# Patient Record
Sex: FEMALE | Race: WHITE | Employment: OTHER | URBAN - METROPOLITAN AREA
[De-identification: names, ages, dates, MRNs, and addresses within clinical notes are randomized per-mention and may not be internally consistent; named-entity substitution may affect disease eponyms.]

---

## 2017-03-16 ENCOUNTER — OFFICE VISIT (OUTPATIENT)
Dept: NEUROLOGY | Age: 82
End: 2017-03-16

## 2017-03-16 VITALS
HEART RATE: 54 BPM | SYSTOLIC BLOOD PRESSURE: 114 MMHG | DIASTOLIC BLOOD PRESSURE: 63 MMHG | HEIGHT: 64 IN | BODY MASS INDEX: 24.92 KG/M2 | OXYGEN SATURATION: 94 % | WEIGHT: 146 LBS

## 2017-03-16 DIAGNOSIS — I10 UNSPECIFIED ESSENTIAL HYPERTENSION: ICD-10-CM

## 2017-03-16 DIAGNOSIS — I25.10 CAD IN NATIVE ARTERY: ICD-10-CM

## 2017-03-16 DIAGNOSIS — F34.1 DYSTHYMIA: ICD-10-CM

## 2017-03-16 DIAGNOSIS — G25.2 ESSENTIAL AND OTHER SPECIFIED FORMS OF TREMOR: Primary | ICD-10-CM

## 2017-03-16 DIAGNOSIS — G62.9 POLYNEUROPATHY: ICD-10-CM

## 2017-03-16 DIAGNOSIS — E78.2 MIXED HYPERLIPIDEMIA: ICD-10-CM

## 2017-03-16 DIAGNOSIS — G25.0 ESSENTIAL AND OTHER SPECIFIED FORMS OF TREMOR: Primary | ICD-10-CM

## 2017-03-16 PROCEDURE — 99214 OFFICE O/P EST MOD 30 MIN: CPT | Performed by: PSYCHIATRY & NEUROLOGY

## 2017-07-26 ENCOUNTER — HOSPITAL ENCOUNTER (OUTPATIENT)
Dept: GENERAL RADIOLOGY | Age: 82
Discharge: HOME | End: 2017-07-26
Attending: PSYCHIATRY & NEUROLOGY | Admitting: PSYCHIATRY & NEUROLOGY

## 2017-07-26 LAB
FOLATE: >20 NG/ML (ref 4.78–24.2)
HOMOCYSTEINE: 20 UMOL/L (ref 0–10)
TSH SERPL DL<=0.05 MIU/L-ACNC: 2.35 UIU/ML (ref 0.27–4.2)
VITAMIN B-12: 196 PG/ML (ref 211–911)

## 2017-07-27 LAB
ESTIMATED AVERAGE GLUCOSE: 102.5 MG/DL
HBA1C MFR BLD: 5.2 %

## 2017-07-31 LAB
ALBUMIN SERPL-MCNC: 3.4 G/DL (ref 3.1–4.9)
ALPHA-1-GLOBULIN: 0.3 G/DL (ref 0.2–0.4)
ALPHA-2-GLOBULIN: 0.9 G/DL (ref 0.4–1.1)
BETA GLOBULIN: 1 G/DL (ref 0.9–1.6)
GAMMA GLOBULIN: 0.8 G/DL (ref 0.6–1.8)
M SPIKE 1 SERUM PROTEIN ELEC: 0.3 G/DL
SPE/IFE INTERPRETATION: NORMAL
TOTAL PROTEIN: 6.5 G/DL (ref 6.4–8.2)

## 2018-01-08 ENCOUNTER — HOSPITAL ENCOUNTER (OUTPATIENT)
Dept: SURGERY | Age: 83
Discharge: HOME | End: 2018-01-08
Attending: OPHTHALMOLOGY | Admitting: OPHTHALMOLOGY

## 2018-01-08 VITALS
RESPIRATION RATE: 16 BRPM | HEIGHT: 64 IN | DIASTOLIC BLOOD PRESSURE: 45 MMHG | HEART RATE: 52 BPM | OXYGEN SATURATION: 98 % | SYSTOLIC BLOOD PRESSURE: 133 MMHG | TEMPERATURE: 97.7 F

## 2018-01-08 RX ORDER — SODIUM CHLORIDE, SODIUM LACTATE, POTASSIUM CHLORIDE, CALCIUM CHLORIDE 600; 310; 30; 20 MG/100ML; MG/100ML; MG/100ML; MG/100ML
INJECTION, SOLUTION INTRAVENOUS CONTINUOUS
Status: DISCONTINUED | OUTPATIENT
Start: 2018-01-08 | End: 2018-01-09 | Stop reason: HOSPADM

## 2018-01-08 RX ADMIN — SODIUM CHLORIDE, SODIUM LACTATE, POTASSIUM CHLORIDE, CALCIUM CHLORIDE: 600; 310; 30; 20 INJECTION, SOLUTION INTRAVENOUS at 07:14

## 2018-01-08 ASSESSMENT — PAIN DESCRIPTION - LOCATION: LOCATION: EYE

## 2018-01-08 ASSESSMENT — PAIN - FUNCTIONAL ASSESSMENT: PAIN_FUNCTIONAL_ASSESSMENT: 0-10

## 2018-01-08 ASSESSMENT — PAIN DESCRIPTION - ORIENTATION: ORIENTATION: LEFT

## 2018-01-08 ASSESSMENT — PAIN SCALES - GENERAL: PAINLEVEL_OUTOF10: 0

## 2018-01-08 ASSESSMENT — PAIN DESCRIPTION - PAIN TYPE: TYPE: SURGICAL PAIN

## 2018-01-08 NOTE — OP NOTE
placed on the eye. The eye was covered with a metal shield. The patient tolerated the procedure well and was taken to the PACU in excellent condition. They will follow up with me tomorrow for postop care.     CDE: 7.46    Lens: [id number] 22.0 D, SN [id number]    Real Brown

## 2018-01-08 NOTE — ANESTHESIA POST-OP
Postoperative Anesthesia Note    Name:    Boris Skelton  MRN:      [id number]    Patient Vitals for the past 12 hrs:   BP Temp Temp src Pulse Resp SpO2 Height   01/08/18 0838 (!) 133/45 97.7 °F (36.5 °C) Temporal 52 16 98 % -   01/08/18 0656 (!) 149/53 97 °F (36.1 °C) Temporal 53 16 100 % 5' 4\" (1.626 m)        LABS:    CBC  Lab Results   Component Value Date/Time    WBC 7.4 05/09/2014 11:45 AM    HGB 12.2 05/09/2014 11:45 AM    HCT 37.2 05/09/2014 11:45 AM     05/09/2014 11:45 AM     RENAL  Lab Results   Component Value Date/Time     05/09/2014 11:45 AM    K 2.6 (LL) 05/09/2014 11:45 AM    CL 98 (L) 05/09/2014 11:45 AM    CO2 33 (H) 05/09/2014 11:45 AM    BUN 15 05/09/2014 11:45 AM    CREATININE 0.6 05/09/2014 11:45 AM    GLUCOSE 95 05/09/2014 11:45 AM     COAGS  Lab Results   Component Value Date/Time    PROTIME 10.3 05/09/2014 11:45 AM    INR 0.92 05/09/2014 11:45 AM    APTT 27.3 05/09/2014 11:45 AM       Intake & Output: In: 300 [I.V.:300]  Out: -     Nausea & Vomiting:  No    Level of Consciousness:  Awake    Pain Assessment:  Adequate analgesia    Anesthesia Complications:  No apparent anesthetic complications    SUMMARY      Vital signs stable  OK to discharge from Stage I post anesthesia care.   Care transferred from Anesthesiology department on discharge from perioperative area

## 2018-01-08 NOTE — H&P
Update to the H and P    No changes to the patient's medical history. No new symptoms or diagnoses. No chest pain or trouble breathing.     Tameka Mix

## 2018-01-08 NOTE — ANESTHESIA PRE-OP
Department of Anesthesiology  Preprocedure Note       Name:  Linda Saenz   Age:  80 y.o.  :  3/16/1932                                          MRN:  [id number]         Date:  2018      Surgeon:    Procedure:    Medications prior to admission:   Prior to Admission medications    Medication Sig Start Date End Date Taking? Authorizing Provider   primidone (MYSOLINE) 50 MG tablet Take 2 tablets by mouth 2 times daily 16  Yes Tom Gutiérrez MD   citalopram (CELEXA) 20 MG tablet Take 20 mg by mouth daily. Yes Historical Provider, MD   aspirin (ASPIRIN CHILDRENS) 81 MG chewable tablet Take 1 tablet by mouth daily. 14  Yes Perez Figueroa MD   carbidopa-levodopa (SINEMET)  MG per tablet Take 1 tablet by mouth 2 times daily. Yes Historical Provider, MD   potassium chloride SA (K-DUR;KLOR-CON M) 10 MEQ tablet Take 10 mEq by mouth daily. Yes Historical Provider, MD   atenolol (TENORMIN) 25 MG tablet Take 25 mg by mouth 2 times daily. Yes Historical Provider, MD   simvastatin (ZOCOR) 20 MG tablet Take 20 mg by mouth nightly. Yes Historical Provider, MD   naproxen (NAPROSYN) 500 MG tablet Take 500 mg by mouth daily     Historical Provider, MD       Current medications:    Current Outpatient Prescriptions   Medication Sig Dispense Refill    primidone (MYSOLINE) 50 MG tablet Take 2 tablets by mouth 2 times daily 360 tablet 3    citalopram (CELEXA) 20 MG tablet Take 20 mg by mouth daily.  aspirin (ASPIRIN CHILDRENS) 81 MG chewable tablet Take 1 tablet by mouth daily. 30 tablet 0    carbidopa-levodopa (SINEMET)  MG per tablet Take 1 tablet by mouth 2 times daily.  potassium chloride SA (K-DUR;KLOR-CON M) 10 MEQ tablet Take 10 mEq by mouth daily.  atenolol (TENORMIN) 25 MG tablet Take 25 mg by mouth 2 times daily.  simvastatin (ZOCOR) 20 MG tablet Take 20 mg by mouth nightly.         naproxen (NAPROSYN) 500 MG tablet Take 500 mg by mouth daily Current Facility-Administered Medications   Medication Dose Route Frequency Provider Last Rate Last Dose    lactated ringers infusion   Intravenous Continuous Yesenia Prasad MD 50 mL/hr at 01/08/18 0714      lidocaine 1 % (PF) injection 0.1 mL  0.1 mL Intradermal Once PRN Yesenia Prasad MD         Facility-Administered Medications Ordered in Other Encounters   Medication Dose Route Frequency Provider Last Rate Last Dose    lidocaine PF 2 % in hylenex   Intraocular Once Lynn Lester MD           Allergies:  No Known Allergies    Problem List:    Patient Active Problem List   Diagnosis Code    Essential and other specified forms of tremor G25.0, G25.2    Hereditary and idiopathic peripheral neuropathy G60.9    Abnormality of gait R26.9    Essential hypertension I10    Mixed hyperlipidemia E78.2    Dysthymia F34.1    Polyneuropathy ([address] G62.9    Essential hypertension I10    CAD in native artery I25.10       Past Medical History:        Diagnosis Date    Arthritis     Asthma     past hx    Hyperlipidemia     Hypertension     Tremors of nervous system     Unspecified cerebral artery occlusion with cerebral infarction        Past Surgical History:        Procedure Laterality Date    BACK SURGERY      CARPAL TUNNEL RELEASE Bilateral     CHOLECYSTECTOMY      HERNIA REPAIR      NASAL POLYP SURGERY      and polyps from \"throat\"       Social History:    Social History   Substance Use Topics    Smoking status: Never Smoker    Smokeless tobacco: Never Used    Alcohol use No                                Counseling given: Not Answered      Vital Signs (Current):   Vitals:    01/08/18 0656   BP: (!) 149/53   Pulse: 53   Resp: 16   Temp: 97 °F (36.1 °C)   TempSrc: Temporal   SpO2: 100%   Height: 5' 4\" (1.626 m)                                              BP Readings from Last 3 Encounters:   01/08/18 (!) 149/53   03/16/17 114/63   11/22/16 140/74       NPO Status: Time of

## 2018-02-21 ENCOUNTER — HOSPITAL ENCOUNTER (OUTPATIENT)
Dept: GENERAL RADIOLOGY | Age: 83
Discharge: HOME | End: 2018-02-21
Attending: PSYCHIATRY & NEUROLOGY | Admitting: PSYCHIATRY & NEUROLOGY

## 2018-02-21 LAB
HOMOCYSTEINE: 17 UMOL/L (ref 0–10)
VITAMIN B-12: 928 PG/ML (ref 211–911)

## 2018-05-17 ENCOUNTER — HOSPITAL ENCOUNTER (OUTPATIENT)
Dept: MAMMOGRAPHY | Age: 83
Discharge: HOME | End: 2018-05-17
Admitting: INTERNAL MEDICINE

## 2018-05-17 DIAGNOSIS — N63.0 LUMP OR MASS IN BREAST: ICD-10-CM

## 2019-01-25 ENCOUNTER — ANESTHESIA EVENT (OUTPATIENT)
Dept: OPERATING ROOM | Age: 84
End: 2019-01-25
Payer: MEDICARE

## 2019-01-28 ENCOUNTER — ANESTHESIA (OUTPATIENT)
Dept: OPERATING ROOM | Age: 84
End: 2019-01-28
Payer: MEDICARE

## 2019-01-28 ENCOUNTER — HOSPITAL ENCOUNTER (OUTPATIENT)
Age: 84
Setting detail: OUTPATIENT SURGERY
Discharge: HOME | End: 2019-01-28
Attending: OPHTHALMOLOGY | Admitting: OPHTHALMOLOGY
Payer: MEDICARE

## 2019-01-28 VITALS
HEART RATE: 65 BPM | TEMPERATURE: 98.8 F | WEIGHT: 139 LBS | DIASTOLIC BLOOD PRESSURE: 80 MMHG | HEIGHT: 61 IN | BODY MASS INDEX: 26.24 KG/M2 | OXYGEN SATURATION: 98 % | SYSTOLIC BLOOD PRESSURE: 138 MMHG | RESPIRATION RATE: 18 BRPM

## 2019-01-28 VITALS — SYSTOLIC BLOOD PRESSURE: 145 MMHG | DIASTOLIC BLOOD PRESSURE: 65 MMHG | OXYGEN SATURATION: 100 %

## 2019-01-28 PROCEDURE — 2580000003 HC RX 258: Performed by: OPHTHALMOLOGY

## 2019-01-28 PROCEDURE — 6370000000 HC RX 637 (ALT 250 FOR IP): Performed by: OPHTHALMOLOGY

## 2019-01-28 PROCEDURE — 3700000001 HC ADD 15 MINUTES (ANESTHESIA): Performed by: OPHTHALMOLOGY

## 2019-01-28 PROCEDURE — 3600000013 HC SURGERY LEVEL 3 ADDTL 15MIN: Performed by: OPHTHALMOLOGY

## 2019-01-28 PROCEDURE — 6360000002 HC RX W HCPCS: Performed by: NURSE ANESTHETIST, CERTIFIED REGISTERED

## 2019-01-28 PROCEDURE — 7100000010 HC PHASE II RECOVERY - FIRST 15 MIN: Performed by: OPHTHALMOLOGY

## 2019-01-28 PROCEDURE — 2709999900 HC NON-CHARGEABLE SUPPLY: Performed by: OPHTHALMOLOGY

## 2019-01-28 PROCEDURE — 7100000011 HC PHASE II RECOVERY - ADDTL 15 MIN: Performed by: OPHTHALMOLOGY

## 2019-01-28 PROCEDURE — 3700000000 HC ANESTHESIA ATTENDED CARE: Performed by: OPHTHALMOLOGY

## 2019-01-28 PROCEDURE — 6360000002 HC RX W HCPCS: Performed by: OPHTHALMOLOGY

## 2019-01-28 PROCEDURE — V2632 POST CHMBR INTRAOCULAR LENS: HCPCS | Performed by: OPHTHALMOLOGY

## 2019-01-28 PROCEDURE — 2580000003 HC RX 258: Performed by: ANESTHESIOLOGY

## 2019-01-28 PROCEDURE — 3600000003 HC SURGERY LEVEL 3 BASE: Performed by: OPHTHALMOLOGY

## 2019-01-28 PROCEDURE — 2500000003 HC RX 250 WO HCPCS: Performed by: OPHTHALMOLOGY

## 2019-01-28 DEVICE — ACRYSOF(R) IQ ASPHERIC IOL SP ACRYLIC FOLDABLELENS WULTRASERT(TM) DELIVERY SYSTEM UV WBLUE LIGHT FILTER. 13.0MM LENGTH 6.0MM ANTERIORASYMMETRIC BICONVEX OPTIC PLANAR HAPTICS.
Type: IMPLANTABLE DEVICE | Site: EYE | Status: FUNCTIONAL
Brand: ACRYSOF ULTRASERT

## 2019-01-28 RX ORDER — BACITRACIN 500 [USP'U]/G
OINTMENT OPHTHALMIC PRN
Status: DISCONTINUED | OUTPATIENT
Start: 2019-01-28 | End: 2019-01-28 | Stop reason: HOSPADM

## 2019-01-28 RX ORDER — PROPOFOL 10 MG/ML
INJECTION, EMULSION INTRAVENOUS PRN
Status: DISCONTINUED | OUTPATIENT
Start: 2019-01-28 | End: 2019-01-28 | Stop reason: SDUPTHER

## 2019-01-28 RX ORDER — SODIUM CHLORIDE, SODIUM LACTATE, POTASSIUM CHLORIDE, CALCIUM CHLORIDE 600; 310; 30; 20 MG/100ML; MG/100ML; MG/100ML; MG/100ML
INJECTION, SOLUTION INTRAVENOUS CONTINUOUS
Status: DISCONTINUED | OUTPATIENT
Start: 2019-01-28 | End: 2019-01-28 | Stop reason: HOSPADM

## 2019-01-28 RX ORDER — SODIUM CHLORIDE 0.9 % (FLUSH) 0.9 %
10 SYRINGE (ML) INJECTION PRN
Status: DISCONTINUED | OUTPATIENT
Start: 2019-01-28 | End: 2019-01-28 | Stop reason: HOSPADM

## 2019-01-28 RX ORDER — SODIUM CHLORIDE 0.9 % (FLUSH) 0.9 %
10 SYRINGE (ML) INJECTION EVERY 12 HOURS SCHEDULED
Status: DISCONTINUED | OUTPATIENT
Start: 2019-01-28 | End: 2019-01-28 | Stop reason: HOSPADM

## 2019-01-28 RX ORDER — LIDOCAINE HYDROCHLORIDE 10 MG/ML
1 INJECTION, SOLUTION EPIDURAL; INFILTRATION; INTRACAUDAL; PERINEURAL
Status: DISCONTINUED | OUTPATIENT
Start: 2019-01-28 | End: 2019-01-28 | Stop reason: HOSPADM

## 2019-01-28 RX ORDER — MAGNESIUM HYDROXIDE 1200 MG/15ML
LIQUID ORAL PRN
Status: DISCONTINUED | OUTPATIENT
Start: 2019-01-28 | End: 2019-01-28 | Stop reason: HOSPADM

## 2019-01-28 RX ADMIN — Medication 0.3 ML: at 08:17

## 2019-01-28 RX ADMIN — SODIUM CHLORIDE, POTASSIUM CHLORIDE, SODIUM LACTATE AND CALCIUM CHLORIDE: 600; 310; 30; 20 INJECTION, SOLUTION INTRAVENOUS at 08:11

## 2019-01-28 RX ADMIN — Medication 0.3 ML: at 08:04

## 2019-01-28 RX ADMIN — Medication 0.3 ML: at 08:28

## 2019-01-28 RX ADMIN — PROPOFOL 50 MG: 10 INJECTION, EMULSION INTRAVENOUS at 09:58

## 2019-01-28 ASSESSMENT — PULMONARY FUNCTION TESTS
PIF_VALUE: 1
PIF_VALUE: 2
PIF_VALUE: 1

## 2019-01-28 ASSESSMENT — PAIN - FUNCTIONAL ASSESSMENT: PAIN_FUNCTIONAL_ASSESSMENT: 0-10

## 2019-01-28 ASSESSMENT — PAIN SCALES - GENERAL: PAINLEVEL_OUTOF10: 0

## 2021-05-05 ENCOUNTER — HOSPITAL ENCOUNTER (OUTPATIENT)
Age: 86
Discharge: HOME | End: 2021-05-05
Payer: MEDICARE

## 2021-05-05 PROCEDURE — 82784 ASSAY IGA/IGD/IGG/IGM EACH: CPT

## 2021-05-05 PROCEDURE — 84155 ASSAY OF PROTEIN SERUM: CPT

## 2021-05-05 PROCEDURE — 83883 ASSAY NEPHELOMETRY NOT SPEC: CPT

## 2021-05-05 PROCEDURE — 36415 COLL VENOUS BLD VENIPUNCTURE: CPT

## 2021-05-05 PROCEDURE — 84165 PROTEIN E-PHORESIS SERUM: CPT

## 2021-05-19 ENCOUNTER — HOSPITAL ENCOUNTER (EMERGENCY)
Age: 86
Discharge: HOME | End: 2021-05-19
Attending: EMERGENCY MEDICINE
Payer: MEDICARE

## 2021-05-19 ENCOUNTER — APPOINTMENT (OUTPATIENT)
Dept: CT IMAGING | Age: 86
End: 2021-05-19
Payer: MEDICARE

## 2021-05-19 VITALS
RESPIRATION RATE: 16 BRPM | OXYGEN SATURATION: 96 % | BODY MASS INDEX: 24.75 KG/M2 | HEIGHT: 64 IN | WEIGHT: 145 LBS | HEART RATE: 63 BPM | TEMPERATURE: 97.4 F | DIASTOLIC BLOOD PRESSURE: 63 MMHG | SYSTOLIC BLOOD PRESSURE: 121 MMHG
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
## 2021-05-19 PROCEDURE — 70450 CT HEAD/BRAIN W/O DYE: CPT

## 2021-05-19 PROCEDURE — 99284 EMERGENCY DEPT VISIT MOD MDM: CPT

## 2021-05-19 NOTE — ED NOTES
Bed: 14  Expected date:   Expected time:   Means of arrival:   Comments:  Simi Jean RN  05/19/21 3710

## 2021-05-19 NOTE — ED PROVIDER NOTES
Emergency Department Encounter    Patient: Tianna Bergeron  MRN: [id number]  : 3/16/1932  Date of Evaluation: 2021  ED Provider:  Zakia Larose MD    Triage Chief Complaint:   Fall (fell in the bathroom this morning, unsure of how she landed, denies pain or hitting her head)    [place]:  Tianna Bergeron is a 80 y.o. female that presents to the ER for evaluation of mechanical fall, the patient herself has no active complaints she has no signs of head trauma. She has no neck pain no back pain. No hip pain. No long bone deformities. No abdominal pain. No chest pain pressure acute shortness of breath no complaints. Not a witnessed mechanical fall. Patient states she has a history of disequilibrium. She is no motor or sensory deficit. No vomiting. She has no complaints.     ROS - see HPI, below listed is current ROS at time of my eval:  General:  No fevers, no chills, no weakness  Eyes:  No recent vison changes, no discharge  ENT:  No sore throat, no nasal congestion, no hearing changes  Cardiovascular:  No chest pain  Respiratory:  No shortness of breath  Gastrointestinal:  No pain, no nausea, no vomiting, no diarrhea  Musculoskeletal:  No muscle pain, no joint pain  Skin:  No rash, no pruritis, no easy bruising  Neurologic:  No speech problems, no  headache, no extremity numbness, no extremity tingling, no extremity weakness, no neck pain or stiffness  Psychiatric:  No anxiety  Extremities:  no edema, no pain    Past Medical History:   Diagnosis Date    Arthritis     Asthma     past hx    Cancer ([facility]     skin    Hyperlipidemia     Hypertension     Tremors of nervous system     Unspecified cerebral artery occlusion with cerebral infarction     X 2-      Past Surgical History:   Procedure Laterality Date    BACK SURGERY      CARPAL TUNNEL RELEASE Bilateral     CATARACT REMOVAL WITH IMPLANT Left 2018    CHOLECYSTECTOMY      HERNIA REPAIR      INTRACAPSULAR CATARACT EXTRACTION sensation intact to light touch in all extremities, CN grossly intact, gait normal, no drift    I have reviewed and interpreted all of the currently available lab results from this visit (if applicable):  No results found for this visit on 05/19/21. Radiographs (if obtained):  Radiologist's Report Reviewed:  No results found. MDM:  Patient presenting after head injury. Presentation, history and physical exam do not appear consistent with intracranial hemorrhage. Presentation is < 24 hours after injury    Patient did have LOC, amnesia or disorientation after head trauma. Patient is >57 years old  No new focal neuro deficits  No AMS  No signs of skull fracture (no hemotympanum, no racoon's eyes, no evidence of CSF otorrhea or rhinorrhea, no mastoid bruising, no skull depression or step off)  No clinical intoxication  Not on anticoagulation/antiplatelet therapy  No bleeding disorder  Did not have >1 episode of Vomiting  Amnesia, if any, less than 30 minutes  No ejection from motor vehicle, was not struck by a vehicle, did not fall from >3 feet or down more than 5 stairs. I completed a structured, evidence-based clinical evaluation to screen for intracranial injury in this patient. The evidence indicates that the patient is very low risk for intracranial injury requiring surgical intervention, and this is consistent with my clinical intuition. The risk of further imaging or hospitalization is likely higher than the risk of the patient having an intracranial injury requiring surgical intervention. It is, therefore, in the patients best interest not to do additional emergent testing or be hospitalized. This was discussed with the patient and they understand and agree. At this point I do believe we can discharge patient, they need to follow-up with their primary care physician and/or neurologist, they understand and agree with the plan, return warnings given. Has no CT or L-spine tenderness.   No

## 2021-05-25 ENCOUNTER — APPOINTMENT (OUTPATIENT)
Dept: GENERAL RADIOLOGY | Age: 86
DRG: 814 | End: 2021-05-25
Payer: MEDICARE

## 2021-05-25 ENCOUNTER — HOSPITAL ENCOUNTER (INPATIENT)
Age: 86
LOS: 11 days | Discharge: HOME | DRG: 814 | End: 2021-06-05
Attending: EMERGENCY MEDICINE | Admitting: HOSPITALIST
Payer: MEDICARE

## 2021-05-25 ENCOUNTER — APPOINTMENT (OUTPATIENT)
Dept: ULTRASOUND IMAGING | Age: 86
DRG: 814 | End: 2021-05-25
Payer: MEDICARE

## 2021-05-25 DIAGNOSIS — N30.00 ACUTE CYSTITIS WITHOUT HEMATURIA: Primary | ICD-10-CM

## 2021-05-25 DIAGNOSIS — N17.9 ACUTE RENAL FAILURE, UNSPECIFIED ACUTE RENAL FAILURE TYPE (HCC): ICD-10-CM

## 2021-05-25 DIAGNOSIS — J18.9 COMMUNITY ACQUIRED PNEUMONIA, UNSPECIFIED LATERALITY: ICD-10-CM

## 2021-05-25 DIAGNOSIS — R29.6 FREQUENT FALLS: ICD-10-CM

## 2021-05-25 LAB
A/G RATIO: 0.7 (ref 1.1–2.2)
ALBUMIN SERPL-MCNC: 2.9 G/DL (ref 3.4–5)
ALP BLD-CCNC: 68 U/L (ref 40–129)
ALT SERPL-CCNC: 27 U/L (ref 10–40)
ANION GAP SERPL CALCULATED.3IONS-SCNC: 15 MMOL/L (ref 3–16)
AST SERPL-CCNC: 39 U/L (ref 15–37)
BACTERIA: ABNORMAL /HPF
BASOPHILS ABSOLUTE: 0 K/UL (ref 0–0.2)
BASOPHILS RELATIVE PERCENT: 0.4 %
BILIRUB SERPL-MCNC: <0.2 MG/DL (ref 0–1)
BILIRUBIN URINE: NEGATIVE
BLOOD, URINE: ABNORMAL
BUN BLDV-MCNC: 69 MG/DL (ref 7–20)
CALCIUM SERPL-MCNC: 8.6 MG/DL (ref 8.3–10.6)
CHLORIDE BLD-SCNC: 99 MMOL/L (ref 99–110)
CLARITY: CLEAR
CO2: 22 MMOL/L (ref 21–32)
COLOR: YELLOW
CREAT SERPL-MCNC: 4 MG/DL (ref 0.6–1.2)
EOSINOPHILS ABSOLUTE: 0.2 K/UL (ref 0–0.6)
EOSINOPHILS RELATIVE PERCENT: 2.2 %
EPITHELIAL CELLS, UA: ABNORMAL /HPF (ref 0–5)
GFR AFRICAN AMERICAN: 13
GFR NON-AFRICAN AMERICAN: 11
GLOBULIN: 4.3 G/DL
GLUCOSE BLD-MCNC: 126 MG/DL (ref 70–99)
GLUCOSE URINE: NEGATIVE MG/DL
HCT VFR BLD CALC: 31.5 % (ref 36–48)
HEMOGLOBIN: 10.4 G/DL (ref 12–16)
KETONES, URINE: NEGATIVE MG/DL
LACTIC ACID, SEPSIS: 1.5 MMOL/L (ref 0.4–1.9)
LEUKOCYTE ESTERASE, URINE: ABNORMAL
LYMPHOCYTES ABSOLUTE: 0.8 K/UL (ref 1–5.1)
LYMPHOCYTES RELATIVE PERCENT: 7.2 %
MCH RBC QN AUTO: 26.2 PG (ref 26–34)
MCHC RBC AUTO-ENTMCNC: 32.9 G/DL (ref 31–36)
MCV RBC AUTO: 79.7 FL (ref 80–100)
MICROSCOPIC EXAMINATION: YES
MONOCYTES ABSOLUTE: 0.6 K/UL (ref 0–1.3)
MONOCYTES RELATIVE PERCENT: 5 %
NEUTROPHILS ABSOLUTE: 9.5 K/UL (ref 1.7–7.7)
NEUTROPHILS RELATIVE PERCENT: 85.2 %
NITRITE, URINE: NEGATIVE
PDW BLD-RTO: 15.4 % (ref 12.4–15.4)
PH UA: 6 (ref 5–8)
PLATELET # BLD: 443 K/UL (ref 135–450)
PMV BLD AUTO: 7.3 FL (ref 5–10.5)
POTASSIUM REFLEX MAGNESIUM: 3.8 MMOL/L (ref 3.5–5.1)
PROTEIN UA: 30 MG/DL
RBC # BLD: 3.95 M/UL (ref 4–5.2)
RBC UA: ABNORMAL /HPF (ref 0–4)
RENAL EPITHELIAL, UA: ABNORMAL /HPF (ref 0–1)
SODIUM BLD-SCNC: 136 MMOL/L (ref 136–145)
SPECIFIC GRAVITY UA: 1.01 (ref 1–1.03)
TOTAL PROTEIN: 7.2 G/DL (ref 6.4–8.2)
TROPONIN: <0.01 NG/ML
URINE REFLEX TO CULTURE: YES
URINE TYPE: ABNORMAL
UROBILINOGEN, URINE: 0.2 E.U./DL
WBC # BLD: 11.1 K/UL (ref 4–11)
WBC UA: ABNORMAL /HPF (ref 0–5)

## 2021-05-25 PROCEDURE — 6370000000 HC RX 637 (ALT 250 FOR IP): Performed by: HOSPITALIST

## 2021-05-25 PROCEDURE — 84484 ASSAY OF TROPONIN QUANT: CPT

## 2021-05-25 PROCEDURE — 6360000002 HC RX W HCPCS: Performed by: EMERGENCY MEDICINE

## 2021-05-25 PROCEDURE — 87040 BLOOD CULTURE FOR BACTERIA: CPT

## 2021-05-25 PROCEDURE — 97530 THERAPEUTIC ACTIVITIES: CPT

## 2021-05-25 PROCEDURE — 87086 URINE CULTURE/COLONY COUNT: CPT

## 2021-05-25 PROCEDURE — 6370000000 HC RX 637 (ALT 250 FOR IP): Performed by: EMERGENCY MEDICINE

## 2021-05-25 PROCEDURE — 96367 TX/PROPH/DG ADDL SEQ IV INF: CPT

## 2021-05-25 PROCEDURE — 6360000002 HC RX W HCPCS: Performed by: HOSPITALIST

## 2021-05-25 PROCEDURE — 96365 THER/PROPH/DIAG IV INF INIT: CPT

## 2021-05-25 PROCEDURE — 81001 URINALYSIS AUTO W/SCOPE: CPT

## 2021-05-25 PROCEDURE — 80053 COMPREHEN METABOLIC PANEL: CPT

## 2021-05-25 PROCEDURE — 97162 PT EVAL MOD COMPLEX 30 MIN: CPT

## 2021-05-25 PROCEDURE — 96366 THER/PROPH/DIAG IV INF ADDON: CPT

## 2021-05-25 PROCEDURE — 1200000000 HC SEMI PRIVATE

## 2021-05-25 PROCEDURE — 85025 COMPLETE CBC W/AUTO DIFF WBC: CPT

## 2021-05-25 PROCEDURE — 71045 X-RAY EXAM CHEST 1 VIEW: CPT

## 2021-05-25 PROCEDURE — 2580000003 HC RX 258: Performed by: EMERGENCY MEDICINE

## 2021-05-25 PROCEDURE — 2580000003 HC RX 258: Performed by: HOSPITALIST

## 2021-05-25 PROCEDURE — 99284 EMERGENCY DEPT VISIT MOD MDM: CPT

## 2021-05-25 PROCEDURE — 83605 ASSAY OF LACTIC ACID: CPT

## 2021-05-25 PROCEDURE — 76770 US EXAM ABDO BACK WALL COMP: CPT

## 2021-05-25 RX ORDER — ACETAMINOPHEN 325 MG/1
650 TABLET ORAL ONCE
Status: COMPLETED | OUTPATIENT
Start: 2021-05-25 | End: 2021-05-25

## 2021-05-25 RX ORDER — SODIUM CHLORIDE 9 MG/ML
25 INJECTION, SOLUTION INTRAVENOUS PRN
Status: DISCONTINUED | OUTPATIENT
Start: 2021-05-25 | End: 2021-06-05 | Stop reason: HOSPADM

## 2021-05-25 RX ORDER — ACETAMINOPHEN 650 MG/1
650 SUPPOSITORY RECTAL EVERY 6 HOURS PRN
Status: DISCONTINUED | OUTPATIENT
Start: 2021-05-25 | End: 2021-06-05 | Stop reason: HOSPADM

## 2021-05-25 RX ORDER — POLYETHYLENE GLYCOL 3350 17 G/17G
17 POWDER, FOR SOLUTION ORAL DAILY PRN
Status: DISCONTINUED | OUTPATIENT
Start: 2021-05-25 | End: 2021-06-05 | Stop reason: HOSPADM

## 2021-05-25 RX ORDER — PRIMIDONE 50 MG/1
50 TABLET ORAL NIGHTLY
Status: DISCONTINUED | OUTPATIENT
Start: 2021-05-25 | End: 2021-06-05 | Stop reason: HOSPADM

## 2021-05-25 RX ORDER — HEPARIN SODIUM 5000 [USP'U]/ML
5000 INJECTION, SOLUTION INTRAVENOUS; SUBCUTANEOUS EVERY 8 HOURS SCHEDULED
Status: DISCONTINUED | OUTPATIENT
Start: 2021-05-25 | End: 2021-06-05 | Stop reason: HOSPADM

## 2021-05-25 RX ORDER — ATORVASTATIN CALCIUM 10 MG/1
10 TABLET, FILM COATED ORAL DAILY
Status: DISCONTINUED | OUTPATIENT
Start: 2021-05-26 | End: 2021-06-05 | Stop reason: HOSPADM

## 2021-05-25 RX ORDER — OXYCODONE HYDROCHLORIDE 5 MG/1
5 TABLET ORAL EVERY 4 HOURS PRN
Status: DISCONTINUED | OUTPATIENT
Start: 2021-05-25 | End: 2021-06-05 | Stop reason: HOSPADM

## 2021-05-25 RX ORDER — 0.9 % SODIUM CHLORIDE 0.9 %
1000 INTRAVENOUS SOLUTION INTRAVENOUS ONCE
Status: COMPLETED | OUTPATIENT
Start: 2021-05-25 | End: 2021-05-25

## 2021-05-25 RX ORDER — SODIUM CHLORIDE 9 MG/ML
INJECTION, SOLUTION INTRAVENOUS CONTINUOUS
Status: DISCONTINUED | OUTPATIENT
Start: 2021-05-25 | End: 2021-05-28

## 2021-05-25 RX ORDER — SODIUM CHLORIDE 0.9 % (FLUSH) 0.9 %
5-40 SYRINGE (ML) INJECTION PRN
Status: DISCONTINUED | OUTPATIENT
Start: 2021-05-25 | End: 2021-06-05 | Stop reason: HOSPADM

## 2021-05-25 RX ORDER — ATENOLOL 25 MG/1
25 TABLET ORAL 2 TIMES DAILY
Status: DISCONTINUED | OUTPATIENT
Start: 2021-05-25 | End: 2021-06-05 | Stop reason: HOSPADM

## 2021-05-25 RX ORDER — ONDANSETRON 2 MG/ML
4 INJECTION INTRAMUSCULAR; INTRAVENOUS EVERY 6 HOURS PRN
Status: DISCONTINUED | OUTPATIENT
Start: 2021-05-25 | End: 2021-06-05 | Stop reason: HOSPADM

## 2021-05-25 RX ORDER — CITALOPRAM 20 MG/1
20 TABLET ORAL DAILY
Status: DISCONTINUED | OUTPATIENT
Start: 2021-05-26 | End: 2021-06-05 | Stop reason: HOSPADM

## 2021-05-25 RX ORDER — SODIUM CHLORIDE 0.9 % (FLUSH) 0.9 %
5-40 SYRINGE (ML) INJECTION EVERY 12 HOURS SCHEDULED
Status: DISCONTINUED | OUTPATIENT
Start: 2021-05-25 | End: 2021-06-05 | Stop reason: HOSPADM

## 2021-05-25 RX ORDER — PROMETHAZINE HYDROCHLORIDE 25 MG/1
12.5 TABLET ORAL EVERY 6 HOURS PRN
Status: DISCONTINUED | OUTPATIENT
Start: 2021-05-25 | End: 2021-06-05 | Stop reason: HOSPADM

## 2021-05-25 RX ORDER — ACETAMINOPHEN 325 MG/1
650 TABLET ORAL EVERY 6 HOURS PRN
Status: DISCONTINUED | OUTPATIENT
Start: 2021-05-25 | End: 2021-06-05 | Stop reason: HOSPADM

## 2021-05-25 RX ORDER — MORPHINE SULFATE 2 MG/ML
2 INJECTION, SOLUTION INTRAMUSCULAR; INTRAVENOUS EVERY 6 HOURS PRN
Status: DISCONTINUED | OUTPATIENT
Start: 2021-05-25 | End: 2021-06-05 | Stop reason: HOSPADM

## 2021-05-25 RX ADMIN — AZITHROMYCIN MONOHYDRATE 500 MG: 500 INJECTION, POWDER, LYOPHILIZED, FOR SOLUTION INTRAVENOUS at 07:34

## 2021-05-25 RX ADMIN — HEPARIN SODIUM 5000 UNITS: 5000 INJECTION INTRAVENOUS; SUBCUTANEOUS at 17:53

## 2021-05-25 RX ADMIN — PRIMIDONE 50 MG: 50 TABLET ORAL at 20:52

## 2021-05-25 RX ADMIN — SODIUM CHLORIDE: 9 INJECTION, SOLUTION INTRAVENOUS at 17:52

## 2021-05-25 RX ADMIN — SODIUM CHLORIDE 1000 ML: 9 INJECTION, SOLUTION INTRAVENOUS at 06:14

## 2021-05-25 RX ADMIN — CARBIDOPA AND LEVODOPA 1 TABLET: 10; 100 TABLET ORAL at 20:52

## 2021-05-25 RX ADMIN — ACETAMINOPHEN 650 MG: 325 TABLET ORAL at 20:52

## 2021-05-25 RX ADMIN — HEPARIN SODIUM 5000 UNITS: 5000 INJECTION INTRAVENOUS; SUBCUTANEOUS at 20:52

## 2021-05-25 RX ADMIN — ACETAMINOPHEN 650 MG: 325 TABLET ORAL at 05:09

## 2021-05-25 RX ADMIN — ATENOLOL 25 MG: 25 TABLET ORAL at 20:52

## 2021-05-25 RX ADMIN — CEFTRIAXONE SODIUM 1000 MG: 1 INJECTION, POWDER, FOR SOLUTION INTRAMUSCULAR; INTRAVENOUS at 06:04

## 2021-05-25 RX ADMIN — SODIUM CHLORIDE 1000 ML: 9 INJECTION, SOLUTION INTRAVENOUS at 07:33

## 2021-05-25 ASSESSMENT — PAIN SCALES - GENERAL
PAINLEVEL_OUTOF10: 6
PAINLEVEL_OUTOF10: 0
PAINLEVEL_OUTOF10: 0
PAINLEVEL_OUTOF10: 5
PAINLEVEL_OUTOF10: 6

## 2021-05-25 NOTE — PROGRESS NOTES
failure, unspecified acute renal failure type ([address] and Frequent falls were also pertinent to this visit. has a past medical history of Arthritis, Asthma, Cancer ([address] Hyperlipidemia, Hypertension, Tremors of nervous system, and Unspecified cerebral artery occlusion with cerebral infarction. has a past surgical history that includes back surgery; hernia repair; Cholecystectomy; Nasal polyp surgery; Carpal tunnel release (Bilateral); Cataract removal with implant (Left, 01/08/2018); and Intracapsular cataract extraction (Right, 1/28/2019). Restrictions  Restrictions/Precautions  Restrictions/Precautions: Fall Risk  Vision/Hearing  Vision: Impaired  Vision Exceptions: Wears glasses for reading  Hearing: Exceptions to [facility]  Hearing Exceptions: Hard of hearing/hearing concerns;Bilateral hearing aid     Subjective  General  Chart Reviewed: Yes  Patient assessed for rehabilitation services?: Yes  Response To Previous Treatment: Not applicable  Family / Caregiver Present: No  Referring Practitioner: Nicci Jarquin MD  Referral Date : 05/25/21  Diagnosis: falls  Follows Commands: Within Functional Limits  General Comment  Comments: cleared by nursing  Subjective  Subjective: pt resting in bed.  Denies pain          Orientation  Orientation  Overall Orientation Status: Impaired  Orientation Level: Disoriented to situation;Oriented to person;Disoriented to time;Disoriented to place  Social/Functional History  Social/Functional History  Lives With: Son  Type of Home: House  Home Layout: One level  Home Access: Stairs to enter with rails  Entrance Stairs - Number of Steps: 4  Bathroom Shower/Tub:  (walk-in tub)  Bathroom Toilet: Standard  Bathroom Equipment: Built-in shower seat  Home Equipment: Rolling walker, [address] Help From: Family  ADL Assistance: Independent  Homemaking Responsibilities: No  Ambulation Assistance: Independent (with RW, short distances)  Transfer Assistance: Independent  Active : No  Cognition        Objective          PROM RLE (degrees)  RLE PROM: WFL  AROM RLE (degrees)  RLE AROM: WFL  PROM LLE (degrees)  LLE PROM: WFL  AROM LLE (degrees)  LLE AROM : WFL  Strength RLE  Comment: 3/5  Strength LLE  Comment: 3/5  Tone RLE  RLE Tone: Normotonic  Tone LLE  LLE Tone: Normotonic  Motor Control  Gross Motor?: WFL  Sensation  Overall Sensation Status: WFL  Bed mobility  Supine to Sit: Minimal assistance (elevated HOB)  Sit to Supine: Moderate assistance  Transfers  Sit to Stand: Moderate Assistance  Stand to sit: Moderate Assistance  Ambulation  Ambulation?: No     Balance  Posture: Poor  Sitting - Static: Poor  Sitting - Dynamic: Fair  Standing - Static: Poor  Standing - Dynamic: Poor  Comments: Posterior LOB upon sitting EOB        Plan   Plan  Times per week: 3-5x/week  Times per day: Daily  Current Treatment Recommendations: Strengthening, Balance Training, Endurance Training, Functional Mobility Training, Transfer Training, Gait Training, Positioning, Equipment Evaluation, Education, & procurement, Patient/Caregiver Education & Training, Safety Education & Training, Home Exercise Program  Safety Devices  Type of devices:  All fall risk precautions in place, Bed alarm in place, Call light within reach, Gait belt, Patient at risk for falls, Left in bed, Nurse notified  Restraints  Initially in place: No      AM-PAC Score  AM-PAC Inpatient Mobility Raw Score : 13 (05/25/21 1614)  AM-PAC Inpatient T-Scale Score : 36.74 (05/25/21 1614)  Mobility Inpatient CMS 0-100% Score: 64.91 (05/25/21 1614)  Mobility Inpatient CMS G-Code Modifier : CL (05/25/21 1614)          Goals  Short term goals  Time Frame for Short term goals: 6/3  Short term goal 1: Pt will complete bed mobility wtih SBA  Short term goal 2: Pt will SPT from bed to chair with CGA  Short term goal 3: Pt will ambulate x 25' with RW with SBA  Short term goal 4: Pt will participate in B LE Exercises to improve mobility by 5/28  Patient Goals   Patient goals : to get stronger       Therapy Time   Individual Concurrent Group Co-treatment   Time In 1540         Time Out 1615         Minutes 35         Timed Code Treatment Minutes: [address] [place], PT

## 2021-05-25 NOTE — CONSULTS
Thanks for consulting [facility] for the care of Olimpia Howard. Full consult will follow  Please call with questions at-  24 Hrs Answering service [phone]  Perfect serve, or cell phone 7 am - [address] [facility], MD   [facility]  [facility]

## 2021-05-25 NOTE — H&P
Hospital Medicine History & Physical      PCP: Sal Lawrence MD    Date of Admission: 5/25/2021    Date of Service: Pt seen/examined on 5/25/21 and Admitted to Inpatient with expected LOS greater than two midnights     Chief Complaint:  Multiple falls at home c/o right sided pain       History Of Present Illness:     80 y.o. female who presented to [facility]  hypertension, hyperlipidemia, coronary artery disease, peripheral neuropathy, tremors, CVA x2 here today after a fall     Patient resides at home with her son. over the last week she has had multiple falls. states she fell again tonight. She is unsure exactly what caused her fall but to her knowledge she did not hit her head or lose consciousness. Denied chest pain or shortness of breath, no cough, NVD, abd pain  . nothing is hurting her, but she keeps falling and is unsure why. no  Dysuria. No FC. She states she was seen in the emergency department last week for a fall as well. Labs showed ARF . BUN/cr 69/4.0. lactic 1.5, trop neg alb 2.9  UA c/w UTI 21-50 WBC mod LE, cxr bibasilar atelectasis less likely pneumonia. Labs from [address] [place] [address] system 4/5/21 TSH 2.3, WBC 6.9, Hgb 11.3, vit D 25OH 46, folate 15.5, B12 1274, BUN/cr were nl 22/0. 76.  but AST/ALT nl 16/18. HGbA1C 11/2020 5.4   admit with acute renal  failure and recurrent falls in parkinsons pt.     Past Medical History:          Diagnosis Date    Arthritis     Asthma     past hx    Cancer ([address]     skin    Hyperlipidemia     Hypertension     Tremors of nervous system     Unspecified cerebral artery occlusion with cerebral infarction     X 2-        Past Surgical History:          Procedure Laterality Date    BACK SURGERY      CARPAL TUNNEL RELEASE Bilateral     CATARACT REMOVAL WITH IMPLANT Left 01/08/2018    CHOLECYSTECTOMY      HERNIA REPAIR      INTRACAPSULAR CATARACT EXTRACTION Right 1/28/2019    PHACOEMULSIFICATION OF CATARACT RIGHT EYE WITH INTRAOCULAR LENS IMPLANT --BRANDON=4-- performed by Ana Cristina Horton MD at [address]      and polyps from \"throat\"       Medications Prior to Admission:      Prior to Admission medications    Medication Sig Start Date End Date Taking? Authorizing Provider   naproxen (NAPROSYN) 500 MG tablet Take 500 mg by mouth 2 times daily     Historical Provider, MD   primidone (MYSOLINE) 50 MG tablet Take 2 tablets by mouth 2 times daily  Patient taking differently: Take 50 mg by mouth nightly  11/22/16   Walter Hill MD   citalopram (CELEXA) 20 MG tablet Take 20 mg by mouth daily. Historical Provider, MD   aspirin (ASPIRIN CHILDRENS) 81 MG chewable tablet Take 1 tablet by mouth daily. 5/9/14   Oneil Sin MD   carbidopa-levodopa (SINEMET)  MG per tablet Take 1 tablet by mouth 2 times daily. Historical Provider, MD   potassium chloride SA (K-DUR;KLOR-CON M) 10 MEQ tablet Take 10 mEq by mouth daily. Historical Provider, MD   atenolol (TENORMIN) 25 MG tablet Take 25 mg by mouth 2 times daily. Historical Provider, MD   simvastatin (ZOCOR) 20 MG tablet Take 20 mg by mouth nightly. Historical Provider, MD       Allergies:  Patient has no known allergies. Social History:      The patient currently lives with son doesn't use any devices for walking     TOBACCO:   reports that she has never smoked. She has never used smokeless tobacco.  ETOH:   reports no history of alcohol use. E-Cigarettes/Vaping Use     Questions Responses    E-Cigarette/Vaping Use Never User    Start Date     Passive Exposure     Quit Date     Counseling Given     Comments             Family History:          Problem Relation Age of Onset    Heart Disease Mother     Diabetes Father        REVIEW OF SYSTEMS COMPLETED:   Pertinent positives as noted in the HPI. All other systems reviewed and negative.     PHYSICAL EXAM PERFORMED:    BP (!) 112/48   Pulse 67   Temp 100.7 °F (38.2 °C)   Resp 28   Ht 5' 4\" (1.626 m) x    Acute renal failure  -consult nephrology  Check renal US  IV hydrate NS 125cc/hr and follow serial BUN/cr  Renal fx was nl 4/24/21 as per [facility]     Recurrent falls  PT OT  Nonfocal    DVT Prophylaxis: heparin 5000 units subq Q8hr   Diet: No diet orders on file  Code Status: No Order    PT/OT Eval Status: Ordered    Dispo - TBD does not use assistive devices for ambulation at home       Belia Lara MD    Thank you Rut Cason MD for the opportunity to be involved in this patient's care. If you have any questions or concerns please feel free to contact me at [phone].

## 2021-05-25 NOTE — ED PROVIDER NOTES
[address] [facility]  ED  EMERGENCY DEPARTMENT ENCOUNTER      Pt Name: Romina Hebert  MRN: [id number]  Armsmagalisgftrupti 3/16/1932  Date of evaluation: 5/25/2021  Provider: Jah Ortega MD    CHIEF COMPLAINT       Chief Complaint   Patient presents with    Fall     Patient comes into ED via [facility] from home with c/o multiple falls at home. Patient now c/o right side pain. HISTORY OF PRESENT ILLNESS   (Location/Symptom, Timing/Onset, Context/Setting, Quality, Duration, Modifying Factors, Severity)  Note limiting factors. Romina Hebert is a 80 y.o. female with past medical history of hypertension, hyperlipidemia, coronary artery disease, peripheral neuropathy here today after a fall    Patient resides at home with her son. She states that over the course the last week she has had multiple falls. She states she fell again tonight. She is unsure exactly what caused her fall but states to her knowledge she did not hit her head or lose consciousness. She denies chest pain or shortness of breath. She actually states nothing is particularly hurting her, but she keeps falling and is unsure why. She has no cough. No abdominal pain. Denies nausea, vomiting or diarrhea. States she has no known dysuria. She notes no obvious fevers. She states she was seen in the emergency department last week for a fall as well. [facility] [place]    Nursing Notes were reviewed. REVIEW OF SYSTEMS    (2-9 systems for level 4, 10 or more for level 5)     Review of Systems    Please see HPI for pertinent positive and negative review of system findings. A full 10 system ROS was performed and otherwise negative.         PAST MEDICAL HISTORY     Past Medical History:   Diagnosis Date    Arthritis     Asthma     past hx    Cancer ([facility] [address]     skin    Hyperlipidemia     Hypertension     Tremors of nervous system     Unspecified cerebral artery occlusion with cerebral infarction     X 2-          SURGICAL HISTORY       Past Surgical History:   Procedure Laterality Date    BACK SURGERY      CARPAL TUNNEL RELEASE Bilateral     CATARACT REMOVAL WITH IMPLANT Left 01/08/2018    CHOLECYSTECTOMY      HERNIA REPAIR      INTRACAPSULAR CATARACT EXTRACTION Right 1/28/2019    PHACOEMULSIFICATION OF CATARACT RIGHT EYE WITH INTRAOCULAR LENS IMPLANT --BRANDON=4-- performed by Lizeth Mora MD at [address]      and polyps from \"throat\"         CURRENT MEDICATIONS       Previous Medications    ASPIRIN (ASPIRIN CHILDRENS) 81 MG CHEWABLE TABLET    Take 1 tablet by mouth daily. ATENOLOL (TENORMIN) 25 MG TABLET    Take 25 mg by mouth 2 times daily. CARBIDOPA-LEVODOPA (SINEMET)  MG PER TABLET    Take 1 tablet by mouth 2 times daily. CITALOPRAM (CELEXA) 20 MG TABLET    Take 20 mg by mouth daily. NAPROXEN (NAPROSYN) 500 MG TABLET    Take 500 mg by mouth 2 times daily     POTASSIUM CHLORIDE SA (K-DUR;KLOR-CON M) 10 MEQ TABLET    Take 10 mEq by mouth daily. PRIMIDONE (MYSOLINE) 50 MG TABLET    Take 2 tablets by mouth 2 times daily    SIMVASTATIN (ZOCOR) 20 MG TABLET    Take 20 mg by mouth nightly. ALLERGIES     Patient has no known allergies. FAMILY HISTORY       Family History   Problem Relation Age of Onset    Heart Disease Mother     Diabetes Father           SOCIAL HISTORY       Social History     Socioeconomic History    Marital status:       Spouse name: None    Number of children: None    Years of education: None    Highest education level: None   Occupational History    None   Tobacco Use    Smoking status: Never Smoker    Smokeless tobacco: Never Used   Vaping Use    Vaping Use: Never used   Substance and Sexual Activity    Alcohol use: No    Drug use: No    Sexual activity: None   Other Topics Concern    None   Social History Narrative    None     Social Determinants of Health     Financial Resource Strain:     Difficulty of Paying Living Expenses:    Food Insecurity:  Worried About [facility] in the Last Year:    [address] in the Last Year:    Transportation Needs:     Lack of Transportation (Medical):  Lack of Transportation (Non-Medical):    Physical Activity:     Days of Exercise per Week:     Minutes of Exercise per Session:    Stress:     Feeling of Stress :    Social Connections:     Frequency of Communication with Friends and Family:     Frequency of Social Gatherings with Friends and Family:     Attends Muslim Services:     Active Member of Clubs or Organizations:     Attends Club or Organization Meetings:     Marital Status:    Intimate Partner Violence:     Fear of Current or Ex-Partner:     Emotionally Abused:     Physically Abused:     Sexually Abused:        SCREENINGS               PHYSICAL EXAM    (up to 7 for level 4, 8 or more for level 5)     ED Triage Vitals [05/25/21 0406]   BP Temp Temp src Pulse Resp SpO2 Height Weight   131/72 100.7 °F (38.2 °C) -- 71 20 95 % 5' 4\" (1.626 m) 145 lb (65.8 kg)       Physical Exam    General appearance:  Cooperative. No acute distress. Skin:  Warm. Dry. Eye:  Extraocular movements intact. Ears, nose, mouth and throat:  Oral mucosa moist,  Neck:  Trachea midline. Heart:  Regular rate and rhythm  Perfusion:  intact  Respiratory:  Lungs clear to auscultation bilaterally. Respirations nonlabored. Abdominal:   Non distended. Nontender  Neurological:  Alert and oriented x 3. Moves all extremities spontaneously. Intact sensation and strength throughout the bilateral upper and lower extremities. Musculoskeletal:   Normal ROM, no deformities. Pelvis stable. Normal range of motion of all upper and lower extremities.           Psychiatric:  Normal mood      DIAGNOSTIC RESULTS       Labs Reviewed   CBC WITH AUTO DIFFERENTIAL - Abnormal; Notable for the following components:       Result Value    WBC 11.1 (*)     RBC 3.95 (*)     Hemoglobin 10.4 (*)     Hematocrit 31.5 (*) MCV 79.7 (*)     Neutrophils Absolute 9.5 (*)     Lymphocytes Absolute 0.8 (*)     All other components within normal limits    Narrative:     Performed at:  [place] [address] [place]   Phone [phone]   COMPREHENSIVE METABOLIC PANEL W/ REFLEX TO MG FOR LOW K - Abnormal; Notable for the following components:    Glucose 126 (*)     BUN 69 (*)     CREATININE 4.0 (*)     GFR Non- 11 (*)     GFR  13 (*)     Albumin 2.9 (*)     Albumin/Globulin Ratio 0.7 (*)     AST 39 (*)     All other components within normal limits    Narrative:     Performed at:  [place] [address] [place]   Phone [phone]   URINE RT REFLEX TO CULTURE - Abnormal; Notable for the following components:    Blood, Urine TRACE-INTACT (*)     Protein, UA 30 (*)     Leukocyte Esterase, Urine MODERATE (*)     All other components within normal limits    Narrative:     Performed at:  [place] [address] [place]   Phone [phone]   MICROSCOPIC URINALYSIS - Abnormal; Notable for the following components:    WBC, UA 21-50 (*)     Epithelial Cells, UA 6-10 (*)     Renal Epithelial, UA 2-5 (*)     Bacteria, UA Rare (*)     All other components within normal limits    Narrative:     Performed at:  [place] [address] [place]   Phone [phone]   CULTURE, BLOOD 1   CULTURE, BLOOD 2   CULTURE, URINE   TROPONIN    Narrative:     Performed at:  [place] [address] [place]   Phone [phone]   LACTATE, SEPSIS    Narrative:     Performed at:  [address], [facility] [place]   Phone [phone]       Interpretation per the Radiologist below, if obtained/available at the time of this note:    XR CHEST PORTABLE   Final Result   Bibasilar atelectasis or, less likely, pneumonia. All other labs/imaging were within normal range or not returned as of this dictation. EMERGENCY DEPARTMENT COURSE and DIFFERENTIAL DIAGNOSIS/MDM:   Vitals:    Vitals:    05/25/21 0406 05/25/21 0511 05/25/21 0626   BP: 131/72 (!) 118/49 (!) 99/56   Pulse: 71 70 65   Resp: 20 21 18   Temp: 100.7 °F (38.2 °C)     SpO2: 95% 99% 96%   Weight: 145 lb (65.8 kg)     Height: 5' 4\" (1.626 m)         Patient presents emergency department today complaining of frequent falls. States she did not injure herself. Denies any headache head injury or neck pain. States she is just felt weak and tired. Unsure what is caused her falls. Found to be febrile. Physical examination otherwise unremarkable with no evidence of trauma. Laboratory studies concerning for acute renal failure with markedly elevated creatinine at 4.0 and elevated BUN. In addition to this, the patient was found to have a urinary tract infection and a possible community-acquired pneumonia versus atelectasis. She was started on Rocephin and azithromycin was given IV fluids and will be admitted to the hospital.  Suspect underlying infectious etiology contributing to her frequent falls and symptoms. MDM    CONSULTS     IP CONSULT TO HOSPITALIST    Critical Care:   None    REASSESSMENT          PROCEDURE     Unless otherwise noted below, none     Procedures      FINAL IMPRESSION      1. Acute cystitis without hematuria    2. Community acquired pneumonia, unspecified laterality    3. Acute renal failure, unspecified acute renal failure type ([address]    4. Frequent falls            DISPOSITION/PLAN   DISPOSITION Decision To Admit 05/25/2021 06:52:13 AM        PATIENT REFERRED TO:  No follow-up provider specified. DISCHARGE MEDICATIONS:  New Prescriptions    No medications on file     Controlled Substances Monitoring:     No flowsheet data found.     (Please

## 2021-05-25 NOTE — PROGRESS NOTES
Patient admitted to room 119 from ER. Patient oriented to room, call light, bed rails, phone, lights and bathroom. Patient instructed about the schedule of the day including: vital sign frequency, lab draws, possible tests, frequency of MD and staff rounds, including RN/MD rounding together at bedside, daily weights, and I &O's. Patient instructed about prescribed diet, how to use 8MENU, and television. Bed alarm in place, patient aware of placement and reason. Telemetry box in place, patient aware of placement and reason. Bed locked, in lowest position, side rails up 2/4, call light within reach. Will continue to monitor.  Electronically signed by Teri Sandhoff, RN on 5/25/2021 at 2:59 PM

## 2021-05-25 NOTE — PROGRESS NOTES
Consult called to [facility] AND [facility] [place]. [facility] on 5/25/2021 at [phone] by Roenn Amaro RN aware.  Jordin Burnett normal...

## 2021-05-25 NOTE — PROGRESS NOTES
4 Eyes Skin Assessment     The patient is being assess for  Admission    I agree that 2 RN's have performed a thorough Head to Toe Skin Assessment on the patient. ALL assessment sites listed below have been assessed. Areas assessed by both nurses on 5/25/21   [x]   Head, Face, and Ears   [x]   Shoulders, Back, and Chest  [x]   Arms, Elbows, and Hands   [x]   Coccyx, Sacrum, and Ischum  [x]   Legs, Feet, and Heels        Does the Patient have Skin Breakdown?   No         Eric Prevention initiated:  No   Wound Care Orders initiated:  No      [facility] nurse consulted for Pressure Injury (Stage 3,4, Unstageable, DTI, NWPT, and Complex wounds):  No      Nurse 1 eSignature: Electronically signed by Awilda Martínez RN on 5/25/21 at 1:27 PM EDT    **SHARE this note so that the co-signing nurse is able to place an eSignature**    Nurse 2 eSignature: {Esignature:[id number]}

## 2021-05-25 NOTE — CONSULTS
[facility]    [facility]              [phone]                      Interval History and plan:      Got fluids in ED  Making urine  Appears confused  No e/o hydronephrosis  Xray shows possible pneumonia  Plan:  She appears to have new onset renal failure,  And with multiple falls suspect dehydration  Was hypotensive to as low as 88 systolic on arrival  Agree with fluids  BP now better, will continue fluids cautiously  Also listed to be on naproxen- but not verified    Possible mass in the kidney, but given her age, will discuss with family after kidney function is resolved before persuing further testing                   Assessment :     Acute Kidney Injury  JOAQUIN likely due to - prerenal/possible ATN  Cr on consultation - 4  Baseline Cr- 0.8 on 4/21    UA- s/o- UTI on abx  Renal Imaging:- 5/21- R- 11.2 cm,L- 12.3 cm   2.7 cm possible mass in right kidney  Otherwise normal looking kidneys  Echo: 2014: normal EF, Grade I DD    Hypertension   BP: (106-144)/(48-65)  Pulse:  [65-74]   BP goal inpatient [phone] systolic inpatient  BP 88 systolic on initial admission      Parkinson's  sepsis                         [facility] would like to thank Melyssa Santana MD   for opportunity to serve this patient      Please call with questions at-   24 Hrs Answering service [phone] or  7 am- 5 pm via Perfect serve or cell phone  Carolann Fleischer, MD          CC/reason for consult :     JOAQUIN     HPI :     Anahi Givens is a 80 y.o. female presented to   the hospital on 5/25/2021 with fall. She has had  Multiple falls at home. She is unable to provide  Good details. No diarrhea,dizziness,vomiting. Denies poor po intake. Not on new medication. Denies urinary problem. She was brought to ED, and was found to have   High BUN/cr, and also possible UTI. Not known to  Have CKD, and she had normal creatinine on 4/21, at  [facility]. We are consulted for JOAQUIN and related issues.     ROS: Seen with- RN    positives in bold   Constitutional:  fever, chills, weakness, weight change, fatigue  Skin:  rash, pruritus, hair loss, bruising, dry skin, petechiae  Head, Face, Neck   headaches, swelling,  cervical adenopathy  Respiratory: shortness of breath, cough, or wheezing  Cardiovascular: chest pain, palpitations, dizzy, edema  Gastrointestinal: nausea, vomiting, diarrhea, constipation,belly pain    Yellow skin, blood in stool  Musculoskeletal:  back pain, muscle weakness, gait problems,       joint pain or swelling. Genitourinary:  dysuria, poor urine flow, flank pain, blood in urine  Neurologic:  vertigo, TIA'S, syncope, seizures, focal weakness  Psychosocial:  insomnia, anxiety, or depression. Additional positive findings: fall                     All other remaining systems are negative or unable to obtain        PMH/PSH/SH/Family History:     Past Medical History:   Diagnosis Date    Arthritis     Asthma     past hx    Cancer ([facility] [address]     skin    Hyperlipidemia     Hypertension     Tremors of nervous system     Unspecified cerebral artery occlusion with cerebral infarction     X 2-        Past Surgical History:   Procedure Laterality Date    BACK SURGERY      CARPAL TUNNEL RELEASE Bilateral     CATARACT REMOVAL WITH IMPLANT Left 01/08/2018    CHOLECYSTECTOMY      HERNIA REPAIR      INTRACAPSULAR CATARACT EXTRACTION Right 1/28/2019    PHACOEMULSIFICATION OF CATARACT RIGHT EYE WITH INTRAOCULAR LENS IMPLANT --BRANDON=4-- performed by Galen Saini MD at [address] [facility] from \"throat\"        reports that she has never smoked. She has never used smokeless tobacco. She reports that she does not drink alcohol and does not use drugs. family history includes Diabetes in her father; Heart Disease in her mother.          Medication:     Current Facility-Administered Medications: atenolol (TENORMIN) tablet 25 mg, 25 mg, Oral, BID  carbidopa-levodopa (SINEMET)  MG per tablet 1 tablet, 1 tablet, Oral, BID  [START ON 5/26/2021] citalopram (CELEXA) tablet 20 mg, 20 mg, Oral, Daily  primidone (MYSOLINE) tablet 50 mg, 50 mg, Oral, Nightly  [START ON 5/26/2021] atorvastatin (LIPITOR) tablet 10 mg, 10 mg, Oral, Daily  sodium chloride flush 0.9 % injection 5-40 mL, 5-40 mL, Intravenous, 2 times per day  sodium chloride flush 0.9 % injection 5-40 mL, 5-40 mL, Intravenous, PRN  0.9 % sodium chloride infusion, 25 mL, Intravenous, PRN  heparin (porcine) injection 5,000 Units, 5,000 Units, Subcutaneous, 3 times per day  promethazine (PHENERGAN) tablet 12.5 mg, 12.5 mg, Oral, Q6H PRN **OR** ondansetron (ZOFRAN) injection 4 mg, 4 mg, Intravenous, Q6H PRN  polyethylene glycol (GLYCOLAX) packet 17 g, 17 g, Oral, Daily PRN  acetaminophen (TYLENOL) tablet 650 mg, 650 mg, Oral, Q6H PRN **OR** acetaminophen (TYLENOL) suppository 650 mg, 650 mg, Rectal, Q6H PRN  [START ON 5/26/2021] cefTRIAXone (ROCEPHIN) 1000 mg IVPB in 50 mL D5W minibag, 1,000 mg, Intravenous, Daily  oxyCODONE (ROXICODONE) immediate release tablet 5 mg, 5 mg, Oral, Q4H PRN  morphine (PF) injection 2 mg, 2 mg, Intravenous, Q6H PRN  0.9 % sodium chloride infusion, , Intravenous, Continuous  Facility-Administered Medications Ordered in Other Encounters: lidocaine PF 2 % in hylenex, , Intraocular, Once       Vitals :     Vitals:    05/25/21 1603   BP: (!) 144/65   Pulse: 74   Resp: 17   Temp:    SpO2: 96%       I & O :       Intake/Output Summary (Last 24 hours) at 5/25/2021 1922  Last data filed at 5/25/2021 4419  Gross per 24 hour   Intake 2660 ml   Output    Net 2660 ml        Physical Examination :     General appearance: Anxious- no, distressed- no, in good spirits- no, confused  HEENT: Lips- normal, teeth- ok , oral mucosa- moist  Neck : Mass- no, appears symmetrical, JVD- not visible  Respiratory: Respiratory effort-  normal, wheeze- no, crackles - o  Cardiovascular:  Ausculation- No M/R/G, Edema no  Abdomen: visible mass- no, distention- no, scar- no, tenderness- no                            hepatosplenomegaly-  no  Musculoskeletal:  clubbing no,cyanosis- no , digital ischemia- no                           muscle strength- grossly normal , tone - grossly normal  Skin: rashes- no , ulcers- no, induration- no, tightening - no  Psychiatric:  Judgement and insight- normal           AAO X 3  Additional finding:      LABS:     Recent Labs     05/25/21  0502   WBC 11.1*   HGB 10.4*   HCT 31.5*        Recent Labs     05/25/21  0502      K 3.8   CL 99   CO2 22   BUN 69*   CREATININE 4.0*   GLUCOSE 126*

## 2021-05-26 PROBLEM — N39.0 ACUTE URINARY TRACT INFECTION: Status: ACTIVE | Noted: 2021-05-26

## 2021-05-26 PROBLEM — G20 PARKINSON'S DISEASE (HCC): Status: ACTIVE | Noted: 2021-05-26

## 2021-05-26 LAB
ANION GAP SERPL CALCULATED.3IONS-SCNC: 14 MMOL/L (ref 3–16)
BASOPHILS ABSOLUTE: 0 K/UL (ref 0–0.2)
BASOPHILS RELATIVE PERCENT: 0.5 %
BUN BLDV-MCNC: 60 MG/DL (ref 7–20)
CALCIUM SERPL-MCNC: 8.7 MG/DL (ref 8.3–10.6)
CHLORIDE BLD-SCNC: 107 MMOL/L (ref 99–110)
CO2: 21 MMOL/L (ref 21–32)
CREAT SERPL-MCNC: 4 MG/DL (ref 0.6–1.2)
EOSINOPHILS ABSOLUTE: 0.2 K/UL (ref 0–0.6)
EOSINOPHILS RELATIVE PERCENT: 2.2 %
GFR AFRICAN AMERICAN: 13
GFR NON-AFRICAN AMERICAN: 11
GLUCOSE BLD-MCNC: 84 MG/DL (ref 70–99)
HCT VFR BLD CALC: 29.1 % (ref 36–48)
HEMOGLOBIN: 9.3 G/DL (ref 12–16)
LYMPHOCYTES ABSOLUTE: 0.8 K/UL (ref 1–5.1)
LYMPHOCYTES RELATIVE PERCENT: 7.9 %
MAGNESIUM: 2 MG/DL (ref 1.8–2.4)
MCH RBC QN AUTO: 25.6 PG (ref 26–34)
MCHC RBC AUTO-ENTMCNC: 32 G/DL (ref 31–36)
MCV RBC AUTO: 80 FL (ref 80–100)
MONOCYTES ABSOLUTE: 0.5 K/UL (ref 0–1.3)
MONOCYTES RELATIVE PERCENT: 4.7 %
NEUTROPHILS ABSOLUTE: 8.6 K/UL (ref 1.7–7.7)
NEUTROPHILS RELATIVE PERCENT: 84.7 %
PDW BLD-RTO: 14.9 % (ref 12.4–15.4)
PLATELET # BLD: 406 K/UL (ref 135–450)
PMV BLD AUTO: 7.3 FL (ref 5–10.5)
POTASSIUM REFLEX MAGNESIUM: 3.1 MMOL/L (ref 3.5–5.1)
PROTEIN PROTEIN: 68 MG/DL
PROTEIN/CREAT RATIO: 1.1 MG/DL
RBC # BLD: 3.64 M/UL (ref 4–5.2)
SODIUM BLD-SCNC: 142 MMOL/L (ref 136–145)
SODIUM URINE: 61 MMOL/L
TOTAL CK: 41 U/L (ref 26–192)
URINE CULTURE, ROUTINE: NORMAL
WBC # BLD: 10.1 K/UL (ref 4–11)

## 2021-05-26 PROCEDURE — 83735 ASSAY OF MAGNESIUM: CPT

## 2021-05-26 PROCEDURE — 84300 ASSAY OF URINE SODIUM: CPT

## 2021-05-26 PROCEDURE — 82570 ASSAY OF URINE CREATININE: CPT

## 2021-05-26 PROCEDURE — 2580000003 HC RX 258: Performed by: INTERNAL MEDICINE

## 2021-05-26 PROCEDURE — 36415 COLL VENOUS BLD VENIPUNCTURE: CPT

## 2021-05-26 PROCEDURE — 82550 ASSAY OF CK (CPK): CPT

## 2021-05-26 PROCEDURE — 2580000003 HC RX 258: Performed by: HOSPITALIST

## 2021-05-26 PROCEDURE — 97166 OT EVAL MOD COMPLEX 45 MIN: CPT

## 2021-05-26 PROCEDURE — 6370000000 HC RX 637 (ALT 250 FOR IP): Performed by: HOSPITALIST

## 2021-05-26 PROCEDURE — 6360000002 HC RX W HCPCS: Performed by: HOSPITALIST

## 2021-05-26 PROCEDURE — 85025 COMPLETE CBC W/AUTO DIFF WBC: CPT

## 2021-05-26 PROCEDURE — 80048 BASIC METABOLIC PNL TOTAL CA: CPT

## 2021-05-26 PROCEDURE — 84156 ASSAY OF PROTEIN URINE: CPT

## 2021-05-26 PROCEDURE — 97530 THERAPEUTIC ACTIVITIES: CPT

## 2021-05-26 PROCEDURE — 1200000000 HC SEMI PRIVATE

## 2021-05-26 PROCEDURE — 97535 SELF CARE MNGMENT TRAINING: CPT

## 2021-05-26 PROCEDURE — 82043 UR ALBUMIN QUANTITATIVE: CPT

## 2021-05-26 RX ORDER — POTASSIUM CHLORIDE 20 MEQ/1
40 TABLET, EXTENDED RELEASE ORAL ONCE
Status: COMPLETED | OUTPATIENT
Start: 2021-05-26 | End: 2021-05-26

## 2021-05-26 RX ADMIN — SODIUM CHLORIDE: 9 INJECTION, SOLUTION INTRAVENOUS at 05:53

## 2021-05-26 RX ADMIN — ATENOLOL 25 MG: 25 TABLET ORAL at 20:00

## 2021-05-26 RX ADMIN — ATENOLOL 25 MG: 25 TABLET ORAL at 09:35

## 2021-05-26 RX ADMIN — PRIMIDONE 50 MG: 50 TABLET ORAL at 20:00

## 2021-05-26 RX ADMIN — POTASSIUM CHLORIDE 40 MEQ: 1500 TABLET, EXTENDED RELEASE ORAL at 09:34

## 2021-05-26 RX ADMIN — HEPARIN SODIUM 5000 UNITS: 5000 INJECTION INTRAVENOUS; SUBCUTANEOUS at 14:39

## 2021-05-26 RX ADMIN — CITALOPRAM HYDROBROMIDE 20 MG: 20 TABLET ORAL at 09:34

## 2021-05-26 RX ADMIN — ATORVASTATIN CALCIUM 10 MG: 10 TABLET, FILM COATED ORAL at 09:34

## 2021-05-26 RX ADMIN — CARBIDOPA AND LEVODOPA 1 TABLET: 10; 100 TABLET ORAL at 20:00

## 2021-05-26 RX ADMIN — CARBIDOPA AND LEVODOPA 1 TABLET: 10; 100 TABLET ORAL at 09:34

## 2021-05-26 RX ADMIN — HEPARIN SODIUM 5000 UNITS: 5000 INJECTION INTRAVENOUS; SUBCUTANEOUS at 21:51

## 2021-05-26 RX ADMIN — CEFTRIAXONE SODIUM 1000 MG: 1 INJECTION, POWDER, FOR SOLUTION INTRAMUSCULAR; INTRAVENOUS at 09:35

## 2021-05-26 RX ADMIN — HEPARIN SODIUM 5000 UNITS: 5000 INJECTION INTRAVENOUS; SUBCUTANEOUS at 05:54

## 2021-05-26 ASSESSMENT — PAIN SCALES - GENERAL
PAINLEVEL_OUTOF10: 0

## 2021-05-26 NOTE — PROGRESS NOTES
Perfect serve sent to [facility], MD:    \" Pt's potassium 3.1 this am, can we get prn orders placed? \"      40 mEq one time dose administered.

## 2021-05-26 NOTE — PROGRESS NOTES
05/25/2021    SPECGRAV 1.010 05/25/2021    GLUCOSEU Negative 05/25/2021       Radiology:  US RENAL COMPLETE   Final Result   No hydronephrosis      2.7 cm region in the central right kidney mildly different echogenicity than   the remainder of the renal parenchyma. This most likely represents normal   renal parenchyma but a mass could not be excluded. A CT of the kidneys would   be helpful for further evaluation. Otherwise, unremarkable appearing kidneys and bladder         XR CHEST PORTABLE   Final Result   Bibasilar atelectasis or, less likely, pneumonia. Assessment/Plan:    Active Hospital Problems    Diagnosis     Acute urinary tract infection [N39.0]     Parkinson's disease ([address] [G20]     Recurrent falls [R29.6]    Parkinson's disease    tremors   Recurrent falls  PT OT  Nonfocal  continue sinemet 10-100mg po BID  Primidone      Sepsis  T 100.7 BP 88/44 hypotensive on initial. Spiked temp to 101 overnight   Acute UTI  Rocephin 1Gm IV daily  FU urine cx     Acute renal failure  2.7cm possible mass right kidney   -consult nephrology, Dr Desiree Mills  renal US-no hydronephrosis. 2.7cm area central right kidney think likely normal parenchyma but mildly different echogenicity. Can't exclude mass. Consider CT    IV hydrate NS 125cc/hr , slowed to 75cc/hr and follow serial BUN/cr  Renal fx was nl 4/24/21 as per [facility]       With falls and renal failure added CK r/o rhabdo     Essential HTN  -continue atenolol 25mg po BID    cxr said atx vs pneumonia-not needing oxygen no cough to suggest pulm infection . Continue rocephin for urine infx . Did not continue zithromax   Her am labs for renal fx to see if responding to the hydration suggesting prerenal cause/dehydration PEND.  Denied any recent NVD or decreased po intake to cause a dehydration     ADDENDUM labs back:  hypokalemia  K 3.1 give KCl 40meq po x1  BUN/cr didn't change despite IVF     DVT Prophylaxis: heparin 5000 units subq Q8hr   Diet:

## 2021-05-26 NOTE — FLOWSHEET NOTE
Received via wc to room 544 per Romy Lauren RN. Alert, in bed with strip alarm on. Denies needs @ this time.

## 2021-05-26 NOTE — PLAN OF CARE
Problem: Falls - Risk of:  Goal: Will remain free from falls  Description: Will remain free from falls  5/26/2021 1106 by Xochitl Flynn RN  Outcome: Ongoing  Note: Pt will remain free from falls throughout hospital stay. Fall precautions in place, bed alarm on, bed in lowest position with wheels locked and side rails 2/4 up. Room door open and hourly rounding completed. Will continue to monitor throughout shift.

## 2021-05-26 NOTE — CARE COORDINATION
CASE MANAGEMENT INITIAL ASSESSMENT      Reviewed chart and completed assessment with: patient   Explained Case Management role/services. Health Care Decision Maker :   Primary Decision Maker: Tahir Montalvo - Child - [phone]          Can this person be reached and be able to respond quickly, such as within a few minutes or hours? Yes    Admit date/status: 5/25/21  Diagnosis: recurrent falls    Is this a Readmission?:  No      Insurance: [facility] required for SNF: Yes       3 night stay required: No    Living arrangements, Adls, care needs, prior to admission: normally lives in a house with her son    Transportation: son     [address] at home:  Walker_X_Cane__RTS_X_ BSC__Shower Chair__  02__ HHN__ CPAP__  BiPap__  Hospital Bed__ W/C___ Other__________    Services in the home and/or outpatient, prior to admission: none    Dialysis Facility (if applicable)   · Name:  · Address:  · Dialysis Schedule:  · Phone:  · Fax:    PT/OT recs: [id number] Exemption Notification (HEN): not initiated     Barriers to discharge: none    Plan/comments: Patient is independent with ADL's at home. Her son does assist her as needed. Discussed rec's of SNF and she is in agreement with plan and with a referral to [facility]. She denies needing a list of SNF's due to she is not familiar with any. Placed call to [facility] with EGS and notified of referral.  She states they are able to accept. At this point, patient has an AMPAC score of 13 so will qualify for \"floor to SNF\" and will not need a traditional precert. Will just need a rapid covid on day of discharge.      ECOC on chart for MD signature

## 2021-05-26 NOTE — PROGRESS NOTES
4 Eyes Skin Assessment     The patient is being assess for   Transfer to New Unit    I agree that 2 RN's have performed a thorough Head to Toe Skin Assessment on the patient. ALL assessment sites listed below have been assessed. Areas assessed for pressure by both nurses:   [x]   Head, Face, and Ears   [x]   Shoulders, Back, and Chest, Abdomen  [x]   Arms, Elbows, and Hands   [x]   Coccyx, Sacrum, and Ischium  [x]   Legs, Feet, and Heels        Skin Assessed Under all Medical Devices by both nurses:  IV               All Mepilex Borders were peeled back and area peeked at by both nurses:  Yes  Please list where Mepilex Borders are located:  coccyx             **SHARE this note so that the co-signing nurse is able to place an eSignature**    Co-signer eSignature: Electronically signed by Jm Salazar RN on 5/26/21 at 4:08 PM EDT    Does the Patient have Skin Breakdown related to pressure?   No            Eric Prevention initiated:  Yes   Wound Care Orders initiated:  NA      [facility] nurse consulted for Pressure Injury (Stage 3,4, Unstageable, DTI, NWPT, Complex wounds)and New or Established Ostomies:  NA      Primary Nurse eSignature: Electronically signed by Gracy Bansal RN on 5/26/21 at 4:16 PM EDT

## 2021-05-26 NOTE — PROGRESS NOTES
Occupational Therapy   Occupational Therapy Initial Assessment and treatment    Date: 2021   Patient Name: Martell Rivers  MRN: [id number]     : 3/16/1932    Date of Service: 2021    Discharge Recommendations:  [address]   If pt discharges prior to next session, this note will serve as discharge summary. See case management note for discharge disposition. Assessment   Performance deficits / Impairments: Decreased functional mobility ; Decreased ADL status; Decreased strength;Decreased safe awareness;Decreased cognition;Decreased endurance;Decreased balance  Assessment: Pt admitted with multiple recent falls at home, pt with hx of Parkinson's. Pt reports independent baseline, however requires extensive assist with basic ADLs, transfers and demos poor standing balance with multiple posterior LOBs in stance requiring mod-max A to maintain balance. Pt would benefit from skilled OT in SNF to promote return to baseline and decrease caregiver burden. Prognosis: Fair  OT Education: OT Role;Plan of Care;ADL Adaptive Strategies;Transfer Training  Patient Education: disease specific: OT role, ADls, balance, transfers  Barriers to Learning: cog  REQUIRES OT FOLLOW UP: Yes  Activity Tolerance  Activity Tolerance: Treatment limited secondary to decreased cognition  Activity Tolerance: 139/61 HR 67 O2 96% on RA  Safety Devices  Safety Devices in place: Yes  Type of devices: Gait belt;Call light within reach; Left in chair;Chair alarm in place;Nurse notified           Patient Diagnosis(es): The primary encounter diagnosis was Acute cystitis without hematuria. Diagnoses of Community acquired pneumonia, unspecified laterality, Acute renal failure, unspecified acute renal failure type ([address] and Frequent falls were also pertinent to this visit.      has a past medical history of Arthritis, Asthma, Cancer ([address] Hyperlipidemia, Hypertension, Tremors of nervous system, and Unspecified cerebral artery occlusion with cerebral infarction. has a past surgical history that includes back surgery; hernia repair; Cholecystectomy; Nasal polyp surgery; Carpal tunnel release (Bilateral); Cataract removal with implant (Left, 01/08/2018); and Intracapsular cataract extraction (Right, 1/28/2019).            Restrictions  Restrictions/Precautions  Restrictions/Precautions: Fall Risk  Position Activity Restriction  Other position/activity restrictions: tele, IV    Subjective   General  Chart Reviewed: Yes  Patient assessed for rehabilitation services?: Yes  Family / Caregiver Present: No  Referring Practitioner: Avril Del Toro MD  Diagnosis: falls  Subjective  Subjective: Pt supine, agreeable  General Comment  Comments: RN clears for therapy  Vital Signs  Temp: 97.8 °F (36.6 °C)  Temp Source: Oral  Pulse: 57  Heart Rate Source: Monitor  Resp: 16  BP: 117/67  BP Location: Right upper arm  MAP (mmHg): 84  Patient Position: Sitting;Up in chair  Oxygen Therapy  SpO2: 94 %  O2 Device: None (Room air)  Social/Functional History  Social/Functional History  Lives With: Son  Type of Home: House  Home Layout: One level  Home Access: Stairs to enter with rails  Entrance Stairs - Number of Steps: 4  Bathroom Shower/Tub:  (walk in tub)  Bathroom Toilet: Standard  Bathroom Equipment: Built-in shower seat  Home Equipment: Rolling walker, [employer]  Receives Help From: Family  ADL Assistance: Independent  Homemaking Responsibilities: No  Ambulation Assistance: Independent (short distances with RW)  Transfer Assistance: Independent  Active : No       Objective   Vision: Impaired  Vision Exceptions: Wears glasses for reading  Hearing: Exceptions to [facility]  Hearing Exceptions: Hard of hearing/hearing concerns;Bilateral hearing aid    Orientation  Overall Orientation Status: Impaired  Orientation Level: Oriented to person;Oriented to place;Oriented to situation     Balance  Sitting Balance: Supervision  Standing Balance: Minimal assistance  Standing Balance  Time: 1-2 min x 3  Activity: to.from restroom, standing at sink for grooming  Functional Mobility  Functional - Mobility Device: Rolling Walker  Activity: To/from bathroom  Assist Level: Minimal assistance  Functional Mobility Comments: overall flexed posture, difficulty maneuvering RW in tight spaces  Toilet Transfers  Toilet - Technique: Ambulating  Equipment Used: Standard toilet  Toilet Transfer: Moderate assistance  Toilet Transfers Comments: multiple posterior LOBs with standing from toilet requiring up to max A to prevent fall, no righting reactions noted  ADL  Feeding: Minimal assistance;Setup (steadying assist for beverages d/t tremors)  Grooming: Moderate assistance  UE Dressing: Moderate assistance  LE Dressing: Dependent/Total (thread LEs into pants and pull up)  Toileting: Maximum assistance  Additional Comments: pt with multiple posterior LOBs upon standing from EOB and toilet requiring up to max A to prevent fall  Tone RUE  RUE Tone: Normotonic  Tone LUE  LUE Tone: Normotonic  Coordination  Movements Are Fluid And Coordinated: No  Coordination and Movement description: Fine motor impairments;Gross motor impairments; Resting tremors; Intention tremors;Tremors; Left UE;Right UE;Decreased accuracy; Decreased speed        Transfers  Sit to stand: Minimal assistance; Moderate assistance  Stand to sit: Minimal assistance; Moderate assistance  Transfer Comments: min/mod A to stand from EOB to RW, posterior LObs upon standing requiring mod A to maintain balance     Cognition  Overall Cognitive Status: Exceptions  Arousal/Alertness: Delayed responses to stimuli  Following Commands: Follows one step commands with repetition; Follows one step commands with increased time  Attention Span: Attends with cues to redirect  Memory: Decreased short term memory  Safety Judgement: Decreased awareness of need for safety  Problem Solving: Decreased awareness of errors  Insights: Not aware of deficits Initiation: Requires cues for some  Sequencing: Requires cues for some                 LUE AROM (degrees)  LUE AROM : WFL  Left Hand AROM (degrees)  Left Hand AROM: WFL  RUE AROM (degrees)  RUE AROM : WFL  Right Hand AROM (degrees)  Right Hand AROM: WFL  LUE Strength  Gross LUE Strength: WFL  RUE Strength  Gross RUE Strength: WFL                   Plan   Plan  Times per week: 3-5x/wk      AM-PAC Score        AM-PAC Inpatient Daily Activity Raw Score: 11 (05/26/21 [facility])  AM-PAC Inpatient ADL T-Scale Score : 29.04 (05/26/21 [facility])  ADL Inpatient CMS 0-100% Score: 70.42 (05/26/21 [facility])  ADL Inpatient CMS G-Code Modifier : CL (05/26/21 [facility])    Goals  Short term goals  Time Frame for Short term goals: 1 week by 6/2  Short term goal 1: Pt will complete 2-3 grooming tasks standing at sink with SPV  Short term goal 2: Pt will complete functional transfers with CGA  Short term goal 3: Pt will complete LB dressing with min A or less  Short term goal 4: Pt will complete toileting with mod A  or less  Short term goal 5: Pt will tolerate B UE ex x 20 reps w/o fatigue  Patient Goals   Patient goals : \"go home\"       Therapy Time   Individual Concurrent Group Co-treatment   Time In 0932         Time Out 1021         Minutes 49         Timed Code Treatment Minutes: 39 Minutes (10 min eval)       Morgan Ugarte OT

## 2021-05-26 NOTE — PLAN OF CARE
Problem: Falls - Risk of:  Goal: Will remain free from falls  Description: Will remain free from falls  Outcome: Ongoing  Note: Pt will remain free from falls throughout hospital stay. Fall precautions in place, bed alarm on, bed in lowest position with wheels locked and side rails 2/4 up. Room door open and hourly rounding completed. Will continue to monitor throughout shift. Problem: Skin Integrity:  Goal: Will show no infection signs and symptoms  Description: Will show no infection signs and symptoms  Outcome: Ongoing  Note: Pt is at risk for skin breakdown. Pt will have skin assessments every shift, Q2 turns, heels elevated off of the bed, and friction and shear prevented when possible. Will continue to monitor for signs of skin breakdown and enforce prevention measures.

## 2021-05-26 NOTE — PROGRESS NOTES
Admit Date: 5/25/2021      INTERVAL HISTORY  Creat 4-->4  Urine 1300  No complaints  No fluid overload  Getting IV fluids     PLAN  Check urine Na  Urine protein  On saline 75 ml/hour                       Acute Kidney Injury  JOAQUIN likely due to ATN  Cr on consultation - 4  Baseline Cr- 0.8 on 4/21   UA- 1.010 protein 30,WBC 21-50 RBC 3-4  Possible  UTI on abx  Renal Imaging:- 5/21- R- 11.2 cm,L- 12.3 cm   2.7 cm possible mass in right kidney  Otherwise normal looking kidneys  Echo: 2014: normal EF, Grade I DD     Hypertension   BP 88 systolic on initial admission        Parkinson's    sepsis         [facility] would like to thank Caleb Loyd MD   for opportunity to serve this patient      Please call with questions at-   24 Hrs Answering service [phone] or  7 am- 5 pm via Perfect serve or cell phone  Graciela Sawyer MD            CC/reason for consult :      JOAQUIN      HPI :      Herminia Crawford is a 80 y.o. female presented to   the hospital on 5/25/2021 with fall. She has had  Multiple falls at home. She is unable to provide  Good details. No diarrhea,dizziness,vomiting. Denies poor po intake. Not on new medication. Denies urinary problem.     She was brought to ED, and was found to have   High BUN/cr, and also possible UTI. Not known to  Have CKD, and she had normal creatinine on 4/21, at  [facility].     We are consulted for JOAQUIN and related issues.     ROS:      Seen with- RN     positives in bold   Constitutional:  fever, chills, weakness, weight change, fatigue  Skin:  rash, pruritus, hair loss, bruising, dry skin, petechiae  Head, Face, Neck   headaches, swelling,  cervical adenopathy  Respiratory: shortness of breath, cough, or wheezing  Cardiovascular: chest pain, palpitations, dizzy, edema  Gastrointestinal: nausea, vomiting, diarrhea, constipation,belly pain    Yellow skin, blood in stool  Musculoskeletal:  back pain, muscle weakness, gait problems,       joint pain or swelling. Genitourinary:  dysuria, poor urine flow, flank pain, blood in urine  Neurologic:  vertigo, TIA'S, syncope, seizures, focal weakness  Psychosocial:  insomnia, anxiety, or depression. Additional positive findings: fall                     All other remaining systems are negative or unable to obtain            Objective:     Patient Vitals for the past 8 hrs:   BP Temp Temp src Pulse Resp SpO2   05/26/21 0815 (!) 125/58 98.6 °F (37 °C) Oral 69 16 93 %   05/26/21 0403 (!) 145/65 98.3 °F (36.8 °C) Oral 69 16 95 %       I/O last 3 completed shifts: In: 3140 [P.O.:840; IV Piggyback:2300]  Out: 56 [Urine:1300]        General appearance:Awake, alert,   Neck: , no JVD and thyroid not enlarged,   Lungs: clear to auscultation bilaterally   Heart: regular rate and rhythm, S1, S2 normal, no murmur, click, rub or gallop  Abdomen: soft, non-tender; bowel sounds normal; no masses,  no organomegaly  Extremities: extremities normal, atraumatic, no edema  Skin: Skin color, texture, turgor normal. No rashes or lesions  Neurologic: Grossly normal     .l  Lab Results   Component Value Date    CREATININE 4.0 (H) 05/26/2021    BUN 60 (H) 05/26/2021     05/26/2021    K 3.1 (L) 05/26/2021     05/26/2021    CO2 21 05/26/2021     Lab Results   Component Value Date    WBC 10.1 05/26/2021    HGB 9.3 (L) 05/26/2021    HCT 29.1 (L) 05/26/2021    MCV 80.0 05/26/2021     05/26/2021            AGLUCOSE)Magnesium:    Lab Results   Component Value Date    MG 2.00 05/26/2021     Phosphorus:  No results found for: PHOS    Uric Acid:  No components found for: URIC    Active Problems:    Recurrent falls    Acute urinary tract infection    Parkinson's disease ([address]  Resolved Problems:    * No resolved hospital problems.  *

## 2021-05-27 LAB
ANION GAP SERPL CALCULATED.3IONS-SCNC: 12 MMOL/L (ref 3–16)
BUN BLDV-MCNC: 51 MG/DL (ref 7–20)
CALCIUM SERPL-MCNC: 8.5 MG/DL (ref 8.3–10.6)
CHLORIDE BLD-SCNC: 106 MMOL/L (ref 99–110)
CO2: 20 MMOL/L (ref 21–32)
CREAT SERPL-MCNC: 3.5 MG/DL (ref 0.6–1.2)
CREATININE URINE: 59.6 MG/DL (ref 28–259)
GFR AFRICAN AMERICAN: 15
GFR NON-AFRICAN AMERICAN: 12
GLUCOSE BLD-MCNC: 88 MG/DL (ref 70–99)
IGA: 94 MG/DL (ref 70–400)
IGG: 1205 MG/DL (ref 700–1600)
IGM: 26 MG/DL (ref 40–230)
MICROALBUMIN UR-MCNC: 6.4 MG/DL
MICROALBUMIN/CREAT UR-RTO: 107.4 MG/G (ref 0–30)
POTASSIUM SERPL-SCNC: 3.5 MMOL/L (ref 3.5–5.1)
SODIUM BLD-SCNC: 138 MMOL/L (ref 136–145)

## 2021-05-27 PROCEDURE — 2580000003 HC RX 258: Performed by: HOSPITALIST

## 2021-05-27 PROCEDURE — 97530 THERAPEUTIC ACTIVITIES: CPT

## 2021-05-27 PROCEDURE — 36415 COLL VENOUS BLD VENIPUNCTURE: CPT

## 2021-05-27 PROCEDURE — 97116 GAIT TRAINING THERAPY: CPT

## 2021-05-27 PROCEDURE — 6370000000 HC RX 637 (ALT 250 FOR IP): Performed by: HOSPITALIST

## 2021-05-27 PROCEDURE — 80048 BASIC METABOLIC PNL TOTAL CA: CPT

## 2021-05-27 PROCEDURE — 1200000000 HC SEMI PRIVATE

## 2021-05-27 PROCEDURE — 2580000003 HC RX 258: Performed by: INTERNAL MEDICINE

## 2021-05-27 PROCEDURE — 97110 THERAPEUTIC EXERCISES: CPT

## 2021-05-27 PROCEDURE — 97535 SELF CARE MNGMENT TRAINING: CPT

## 2021-05-27 PROCEDURE — 6360000002 HC RX W HCPCS: Performed by: HOSPITALIST

## 2021-05-27 RX ORDER — AMLODIPINE BESYLATE 5 MG/1
10 TABLET ORAL DAILY
Status: DISCONTINUED | OUTPATIENT
Start: 2021-05-27 | End: 2021-06-05 | Stop reason: HOSPADM

## 2021-05-27 RX ADMIN — AMLODIPINE BESYLATE 10 MG: 5 TABLET ORAL at 15:19

## 2021-05-27 RX ADMIN — HEPARIN SODIUM 5000 UNITS: 5000 INJECTION INTRAVENOUS; SUBCUTANEOUS at 05:44

## 2021-05-27 RX ADMIN — CARBIDOPA AND LEVODOPA 1 TABLET: 10; 100 TABLET ORAL at 08:46

## 2021-05-27 RX ADMIN — ATENOLOL 25 MG: 25 TABLET ORAL at 08:45

## 2021-05-27 RX ADMIN — CARBIDOPA AND LEVODOPA 1 TABLET: 10; 100 TABLET ORAL at 20:13

## 2021-05-27 RX ADMIN — PRIMIDONE 50 MG: 50 TABLET ORAL at 20:13

## 2021-05-27 RX ADMIN — ATENOLOL 25 MG: 25 TABLET ORAL at 20:13

## 2021-05-27 RX ADMIN — HEPARIN SODIUM 5000 UNITS: 5000 INJECTION INTRAVENOUS; SUBCUTANEOUS at 14:52

## 2021-05-27 RX ADMIN — SODIUM CHLORIDE: 9 INJECTION, SOLUTION INTRAVENOUS at 04:37

## 2021-05-27 RX ADMIN — SODIUM CHLORIDE: 9 INJECTION, SOLUTION INTRAVENOUS at 20:07

## 2021-05-27 RX ADMIN — HEPARIN SODIUM 5000 UNITS: 5000 INJECTION INTRAVENOUS; SUBCUTANEOUS at 23:08

## 2021-05-27 RX ADMIN — ATORVASTATIN CALCIUM 10 MG: 10 TABLET, FILM COATED ORAL at 08:45

## 2021-05-27 RX ADMIN — CEFTRIAXONE SODIUM 1000 MG: 1 INJECTION, POWDER, FOR SOLUTION INTRAMUSCULAR; INTRAVENOUS at 08:43

## 2021-05-27 RX ADMIN — CITALOPRAM HYDROBROMIDE 20 MG: 20 TABLET ORAL at 08:46

## 2021-05-27 NOTE — PROGRESS NOTES
Admit Date: 5/25/2021      INTERVAL HISTORY  No complaints  Walked with PT in the [address]   Creat 4-->3.5  K 3.5  Urine 1300  No fluid overload  Getting IV fluids   Urine P/C 1.1 G  Urine albumin only 107  This raise questions about myeloma     PLAN  Check myeloma screen   Check urine Na  On saline 75 ml/hour                       Acute Kidney Injury  JOAQUIN likely due to ATN  Cr on consultation - 4  Baseline Cr- 0.8 on 4/21   UA- 1.010 protein 30,WBC 21-50 RBC 3-4  Possible  UTI on abx  Renal Imaging:- 5/21- R- 11.2 cm,L- 12.3 cm   2.7 cm possible mass in right kidney  Otherwise normal looking kidneys  Echo: 2014: normal EF, Grade I DD     Hypertension   BP 88 systolic on initial admission        Parkinson's    sepsis         [facility] would like to thank Miranda Edwards MD   for opportunity to serve this patient      Please call with questions at-   24 Hrs Answering service [phone] or  7 am- 5 pm via Perfect serve or cell phone  Clarissa Frank MD            CC/reason for consult :      JOAQUIN      HPI :      Mariely Summers is a 80 y.o. female presented to   the hospital on 5/25/2021 with fall. She has had  Multiple falls at home. She is unable to provide  Good details. No diarrhea,dizziness,vomiting. Denies poor po intake. Not on new medication. Denies urinary problem.     She was brought to ED, and was found to have   High BUN/cr, and also possible UTI.  Not known to  Have CKD, and she had normal creatinine on 4/21, at  [facility].     We are consulted for JOAQUIN and related issues.     ROS:      Seen with- RN     positives in bold   Constitutional:  fever, chills, weakness, weight change, fatigue  Skin:  rash, pruritus, hair loss, bruising, dry skin, petechiae  Head, Face, Neck   headaches, swelling,  cervical adenopathy  Respiratory: shortness of breath, cough, or wheezing  Cardiovascular: chest pain, palpitations, dizzy, edema  Gastrointestinal: nausea, vomiting, diarrhea, constipation,belly pain    Yellow skin, blood in stool  Musculoskeletal:  back pain, muscle weakness, gait problems,       joint pain or swelling. Genitourinary:  dysuria, poor urine flow, flank pain, blood in urine  Neurologic:  vertigo, TIA'S, syncope, seizures, focal weakness  Psychosocial:  insomnia, anxiety, or depression. Additional positive findings: fall                     All other remaining systems are negative or unable to obtain            Objective:     Patient Vitals for the past 8 hrs:   BP Temp Temp src Pulse Resp SpO2   05/27/21 0751 (!) 161/69 97.7 °F (36.5 °C) Oral 66 14 96 %   05/27/21 0249 (!) 164/76 97.8 °F (36.6 °C) Oral 67 16 95 %       I/O last 3 completed shifts:  In: -   Out: 200 [Urine:200]        General appearance:Awake, alert,   Neck: , no JVD and thyroid not enlarged,   Lungs: clear to auscultation bilaterally   Heart: regular rate and rhythm, S1, S2 normal, no murmur, click, rub or gallop  Abdomen: soft, non-tender; bowel sounds normal; no masses,  no organomegaly  Extremities: extremities normal, atraumatic, no edema  Skin: Skin color, texture, turgor normal. No rashes or lesions  Neurologic: Grossly normal     .l  Lab Results   Component Value Date    CREATININE 3.5 (H) 05/27/2021    BUN 51 (H) 05/27/2021     05/27/2021    K 3.5 05/27/2021     05/27/2021    CO2 20 (L) 05/27/2021     Lab Results   Component Value Date    WBC 10.1 05/26/2021    HGB 9.3 (L) 05/26/2021    HCT 29.1 (L) 05/26/2021    MCV 80.0 05/26/2021     05/26/2021            AGLUCOSE)Magnesium:    Lab Results   Component Value Date    MG 2.00 05/26/2021     Phosphorus:  No results found for: PHOS    Uric Acid:  No components found for: URIC    Active Problems:    Recurrent falls    Acute urinary tract infection    Parkinson's disease ([facility]  Resolved Problems:    * No resolved hospital problems.  *

## 2021-05-27 NOTE — DISCHARGE INSTR - COC
Continuity of Care Form    Patient Name: Winnie Mcnamara   :  3/16/1932  MRN:  [id number]    Admit date:  2021  Discharge date:  ***    Code Status Order: Full Code   Advance Directives:   [facility] Documentation       Date/Time Healthcare Directive Type of Healthcare Directive Copy in [address] Agent's Name Healthcare Agent's Phone Number    21 1300  Yes, patient has an advance directive for healthcare treatment  Living will;Durable power of  for health care  No, copy requested from family pts son trina to bring in                  Admitting Physician:  Barry Lizama MD  PCP: Papa De La Garza MD    Discharging Nurse: [facility] Unit/Room#: 8892/0596-73  Discharging Unit Phone Number: ***    Emergency Contact:   Extended Emergency Contact Information  Primary Emergency Contact: [place] [address]  Phone: [phone]  Relation: Child  Secondary Emergency Contact: [address]  Mobile Phone: [phone]  Relation: Child    Past Surgical History:  Past Surgical History:   Procedure Laterality Date    BACK SURGERY      CARPAL TUNNEL RELEASE Bilateral     CATARACT REMOVAL WITH IMPLANT Left 2018    CHOLECYSTECTOMY      HERNIA REPAIR      INTRACAPSULAR CATARACT EXTRACTION Right 2019    PHACOEMULSIFICATION OF CATARACT RIGHT EYE WITH INTRAOCULAR LENS IMPLANT --BRANDON=4-- performed by Laura Boothe MD at [address]      and polyps from \"throat\"       Immunization History:   Immunization History   Administered Date(s) Administered    Tdap (Boostrix, Adacel) 2015       Active Problems:  Patient Active Problem List   Diagnosis Code    Essential and other specified forms of tremor G25.0, G25.2    Hereditary and idiopathic peripheral neuropathy G60.9    Abnormality of gait R26.9    Essential hypertension I10    Mixed hyperlipidemia E78.2    Dysthymia F34.1    Polyneuropathy G62.9 Test): {Done Not Done CVGY:827460728}    Treatments at the Time of Hospital Discharge:   Respiratory Treatments: ***  Oxygen Therapy:  {Therapy; copd oxygen:77891}  Ventilator:    {MH CC Vent EUYQ:031857579}    Rehab Therapies: Physical Therapy, Occupational Therapy and Nurse  Weight Bearing Status/Restrictions: [address] [facility] Weight Bearin}  Other Medical Equipment (for information only, NOT a DME order):  {EQUIPMENT:200563100}  Other Treatments: [facility]: LEVEL 3 [address] to establish plan of care for patient over 60 day period   Nursing  Initial home SN evaluation visit to occur within 24-48 hours for:  1)  medication management  2)  VS and clinical assessment  3)  S&S chronic disease exacerbation education + when to contact MD/NP  4)  care coordination  Medication Reconciliation during 1st SN visit  PT/OT/Speech   Evaluations in home within 24-48 hours of discharge to include DME and home safety   Frontload therapy 5 days, then 3x a week   OT to evaluate if patient has [address] needs for personal care    evaluation within 24-48 hours to evaluate resources & insurance for potential AL, [place], LTC, and Medicaid options   Palliative Care referral within 5 days of hospital discharge   PCP Visit scheduled within 3 - 7 days of hospital discharge    [address] (If patient is agreeable and meets guidelines)      Patient's personal belongings (please select all that are sent with patient):  {CHP DME Belongings:529763121}    RN SIGNATURE:  {Esignature:835310596}    CASE MANAGEMENT/SOCIAL WORK SECTION    Inpatient Status Date: 2021    Readmission Risk Assessment Score:  Readmission Risk              Risk of Unplanned Readmission:  9           Discharging to Facility/ Agency   Name:  [facility] care    Address: [address] [place] [address], [address] Shasha ABARCA  Phone: [phone]  Fax: [phone]        / signature: Electronically signed by Nolene Litten, LSW on 5/30/21 at 11:55 AM EDT    PHYSICIAN SECTION    Prognosis: Guarded    Condition at Discharge: Stable    Rehab Potential (if transferring to Rehab): N/A    Recommended Labs or Other Treatments After Discharge: [facility] with PT/OT, BMP on 6/7, send results to Dr. Vicenta Haque    Physician Certification: I certify the above information and transfer of Alex Avery  is necessary for the continuing treatment of the diagnosis listed and that she requires Home health care for greater than 30 days.      Update Admission H&P: No change in H&P    PHYSICIAN SIGNATURE:  Electronically signed by Matthew Painting CNP on 6/5/21 at 12:19 PM EDT

## 2021-05-27 NOTE — PROGRESS NOTES
occlusion with cerebral infarction. has a past surgical history that includes back surgery; hernia repair; Cholecystectomy; Nasal polyp surgery; Carpal tunnel release (Bilateral); Cataract removal with implant (Left, 01/08/2018); and Intracapsular cataract extraction (Right, 1/28/2019). Restrictions  Restrictions/Precautions  Restrictions/Precautions: Fall Risk, General Precautions  Position Activity Restriction  Other position/activity restrictions: tele, IV     Subjective   General  Chart Reviewed: Yes  Patient assessed for rehabilitation services?: Yes  Response to previous treatment: Patient with no complaints from previous session  Family / Caregiver Present: No  Referring Practitioner: Sherryle Orleans, MD  Diagnosis: falls    Subjective  Subjective: Pt resting in bed, agreeable to OT treatment.     Vital Signs  Pulse: 66  BP: (!) 151/65  BP Location: Left upper arm  Patient Currently in Pain: Denies  Oxygen Therapy  SpO2: 97 %  O2 Device: None (Room air)     Orientation  Orientation  Orientation Level: Oriented to person;Oriented to place;Oriented to situation     Objective    ADL  Grooming: Minimal assistance (to brush teeth, wash hands, and comb hair in stance at sink)  Toileting: Minimal assistance (min A for clothing management d/t urgency, otherwise CGA)     Balance  Sitting Balance: Supervision  Standing Balance: Minimal assistance (primariy CGA with RW, min A for 1 LOB during sink ADLs)  Standing Balance  Activity: functional/bathroom mobility, sink ADLs    Functional Mobility  Functional - Mobility Device: Rolling Walker  Activity: To/from bathroom  Assist Level: Contact guard assistance    Toilet Transfers  Toilet - Technique: Ambulating  Equipment Used: Standard toilet (with use of grab bars)  Toilet Transfer: Contact guard assistance    Bed mobility  Supine to Sit: Stand by assistance (to R with HOB elevated)     Transfers  Sit to stand: Contact guard assistance  Stand to sit: Minimal assistance Transfer Comments: cues for safety     Cognition  Safety Judgement: Decreased awareness of need for safety     Plan   Plan  Times per week: 3-5x/wk    AM-PAC Score  AM-PAC Inpatient Daily Activity Raw Score: 18 (05/27/21 1437)  AM-PAC Inpatient ADL T-Scale Score : 38.66 (05/27/21 1437)  ADL Inpatient CMS 0-100% Score: 46.65 (05/27/21 1437)  ADL Inpatient CMS G-Code Modifier : CK (05/27/21 1437)    Goals  Short term goals  Time Frame for Short term goals: 1 week by 6/2  Short term goal 1: Pt will complete 2-3 grooming tasks standing at sink with SPV-- ongoing 5/27/21  Short term goal 2: Pt will complete functional transfers with CGA-- ongoing 5/27/21  Short term goal 3: Pt will complete LB dressing with min A or less-- ongoing 5/27/21  Short term goal 4: Pt will complete toileting with mod A or less-- GOAL MET, pt demos min A 5/27/21  Short term goal 5: Pt will tolerate B UE ex x 20 reps w/o fatigue-- ongoing 5/27/21  Patient Goals   Patient goals : \"go home\"       Therapy Time   Individual Concurrent Group Co-treatment   Time In 1350         Time Out 1430         Minutes [address], KHANNA/L

## 2021-05-27 NOTE — CARE COORDINATION
CM updated by Dr. Rory Cox at 1300 rounds pt likely will dc tomorrow. DCP is to [facility]F. Spoke to Stacy Loaiza with [facility] and pt does NOT need precert as they show medicare primary. Met with pt at bedside with son on the phone on this day to confirm [facility]/SNF is DCP. Both in agreement. Pt needs rapid covid-19 test done day of DC.  CM following-Ginette Coleman RN

## 2021-05-27 NOTE — PROGRESS NOTES
Physical Therapy  Facility/Department: [facility] C5 - MED SURG/ORTHO  Daily Treatment Note  NAME: Marcia Duron  : 3/16/1932  MRN: [id number]    Date of Service: 2021    Discharge Recommendations:  Subacute/Skilled Nursing Facility   PT Equipment Recommendations  Equipment Needed: No  Other: defer    Assessment   Body structures, Functions, Activity limitations: Decreased functional mobility ; Decreased balance;Decreased ADL status; Decreased strength;Decreased endurance  Assessment: Pt able to transfer and amb w/ min A, amb rw 30' w/o LOB w/  fatigue quickly. Min verbal cues for hand placement and for improved posture. Recommend SNF upon DC to improve functional strength and balance for independence with mobility  Treatment Diagnosis: decreased mobility  Prognosis: Good  Decision Making: Medium Complexity  Patient Education: Pt expressed understanding on benefits of repositioning and OOB activity  Barriers to Learning: [place], cognition  REQUIRES PT FOLLOW UP: Yes  Activity Tolerance  Activity Tolerance: Patient Tolerated treatment well;Patient limited by fatigue     Patient Diagnosis(es): The primary encounter diagnosis was Acute cystitis without hematuria. Diagnoses of Community acquired pneumonia, unspecified laterality, Acute renal failure, unspecified acute renal failure type ([address] and Frequent falls were also pertinent to this visit. has a past medical history of Arthritis, Asthma, Cancer ([address] Hyperlipidemia, Hypertension, Tremors of nervous system, and Unspecified cerebral artery occlusion with cerebral infarction. has a past surgical history that includes back surgery; hernia repair; Cholecystectomy; Nasal polyp surgery; Carpal tunnel release (Bilateral); Cataract removal with implant (Left, 2018); and Intracapsular cataract extraction (Right, 2019).     Restrictions  Restrictions/Precautions  Restrictions/Precautions: Fall Risk  Position Activity Restriction  Other position/activity Patient goals : to get stronger    Plan    Plan  Times per week: 3-5x/week  Times per day: Daily  Current Treatment Recommendations: Strengthening, Balance Training, Endurance Training, Functional Mobility Training, Transfer Training, Gait Training, Positioning, Equipment Evaluation, Education, & procurement, Patient/Caregiver Education & Training, Safety Education & Training, Home Exercise Program  Safety Devices  Type of devices:  All fall risk precautions in place, Call light within reach, Gait belt, Patient at risk for falls, Nurse notified, Left in chair, Chair alarm in place  Restraints  Initially in place: No     Therapy Time   Individual Concurrent Group Co-treatment   Time In 1000         Time Out 1039         Minutes 39         Timed Code Treatment Minutes: [address] [facility] E

## 2021-05-27 NOTE — PROGRESS NOTES
Hospitalist Progress Note      PCP: Agustina Wooten MD    Date of Admission: 5/25/2021    Chief Complaint: Multiple falls at home c/o right sided pain     Hospital Course: 80 y.o. female presented to [facility]  hypertension, hyperlipidemia, coronary artery disease, peripheral neuropathy, tremors, CVA x2 here today after a fall     resides at home with her son. over the last week she has had multiple falls. states she fell again tonight. unsure exactly what caused her fall but to her knowledge she did not hit her head or lose consciousness. Denied chest pain or shortness of breath, no cough, NVD, abd pain  . nothing is hurting her, but she keeps falling and is unsure why. no  Dysuria. No FC.  She states she was seen in the ED last week for a fall as well. Labs showed ARF . BUN/cr 69/4.0. lactic 1.5, trop neg alb 2.9  UA c/w UTI 21-50 WBC mod LE, cxr bibasilar atelectasis less likely pneumonia.     Labs from [address] [place] [address] system 4/5/21 TSH 2.3, WBC 6.9, Hgb 11.3, vit D 25OH 46, folate 15.5, B12 1274, BUN/cr were nl 22/0. 76.  but AST/ALT nl 16/18. HGbA1C 11/2020 5. 4     admit with acute renal  failure and recurrent falls in parkinsons pt. She doesn't use assitve device for walking at home. See by nephrology for acute renal failure. Hydrating with NS 75cc/hr after one day cr hadn't changed but renal fx falling 5/27 . Seen by PT recommending SNF for rehab        Subjective: sleeping I don't see the mouth tremors. resps easy and even, cr fell today from 4.0 to 3.5 .  Urine cx only <50,000 mixed will complete at least 3 days IV rocephin   all other systems neg       Medications:  Reviewed    Infusion Medications    sodium chloride      sodium chloride 75 mL/hr at 05/27/21 9026     Scheduled Medications    atenolol  25 mg Oral BID    carbidopa-levodopa  1 tablet Oral BID    citalopram  20 mg Oral Daily    primidone  50 mg Oral Nightly    atorvastatin  10 mg Oral Daily    sodium chloride flush 5-40 mL Intravenous 2 times per day    heparin (porcine)  5,000 Units Subcutaneous 3 times per day    cefTRIAXone (ROCEPHIN) IV  1,000 mg Intravenous Daily     PRN Meds: sodium chloride flush, sodium chloride, promethazine **OR** ondansetron, polyethylene glycol, acetaminophen **OR** acetaminophen, oxyCODONE, morphine      Intake/Output Summary (Last 24 hours) at 5/27/2021 0947  Last data filed at 5/27/2021 0935  Gross per 24 hour   Intake 240 ml   Output 200 ml   Net 40 ml       Physical Exam Performed:    BP (!) 161/69   Pulse 66   Temp 97.7 °F (36.5 °C) (Oral)   Resp 14   Ht 5' 4\" (1.626 m)   Wt 145 lb (65.8 kg)   SpO2 96%   BMI 24.89 kg/m²   General appearance:  sleeping no tremors while sleeps HEENT:  Normocephalic, atraumatic without obvious deformity. PERRL   EOMI Conjunctivae/corneas clear. Neck: Supple,  full range of motion. No JVD Trachea midline. Respiratory:  Normal  effort. Clear to auscultation,  Cardiovascular:  Regular rate and rhythm with normal S1/S2 without murmurs, rubs or gallops. Abdomen: Soft, non-tender, non-distended with normal bowel sounds. Musculoskeletal:  No clubbing, cyanosis or edema bilaterally. Skin: Skin color, texture, turgor normal.  No rashes or lesions. Neurologic: nonfocal WEATHERS no cogwheeling Cranial nerves: II-XII intact, grossly non-focal. Resting tremors   Psychiatric:  Alert and oriented at baseline    bedpan as needed   Peripheral Pulses: +2 palpable, equal bilaterally       Labs:   Recent Labs     05/25/21  0502 05/26/21  0705   WBC 11.1* 10.1   HGB 10.4* 9.3*   HCT 31.5* 29.1*    406     Recent Labs     05/25/21  0502 05/26/21  0705 05/27/21  0618    142 138   K 3.8 3.1* 3.5   CL 99 107 106   CO2 22 21 20*   BUN 69* 60* 51*   CREATININE 4.0* 4.0* 3.5*   CALCIUM 8.6 8.7 8.5     Recent Labs     05/25/21  0502   AST 39*   ALT 27   BILITOT <0.2   ALKPHOS 68     No results for input(s): INR in the last 72 hours.   Recent Labs     05/25/21  0502 05/26/21  0705   CKTOTAL  --  41   TROPONINI <0.01  --        Urinalysis:      Lab Results   Component Value Date    NITRU Negative 05/25/2021    WBCUA 21-50 05/25/2021    BACTERIA Rare 05/25/2021    RBCUA 3-4 05/25/2021    BLOODU TRACE-INTACT 05/25/2021    SPECGRAV 1.010 05/25/2021    GLUCOSEU Negative 05/25/2021       Radiology:  US RENAL COMPLETE   Final Result   No hydronephrosis      2.7 cm region in the central right kidney mildly different echogenicity than   the remainder of the renal parenchyma. This most likely represents normal   renal parenchyma but a mass could not be excluded. A CT of the kidneys would   be helpful for further evaluation. Otherwise, unremarkable appearing kidneys and bladder         XR CHEST PORTABLE   Final Result   Bibasilar atelectasis or, less likely, pneumonia. Assessment/Plan:    Active Hospital Problems    Diagnosis     Acute urinary tract infection [N39.0]     Parkinson's disease ([address] [G20]     Recurrent falls [R29.6]    Parkinson's disease    tremors   Recurrent falls  PT OT  Nonfocal  continue sinemet 10-100mg po BID  Primidone      Sepsis  T 100.7 BP 88/44 hypotensive on initial. Spiked temp to 101 overnight   Acute UTI  Rocephin 1Gm IV daily   urine cx 50,000 mixed mechelle. Day 2 Rocephin will give 3-day IV course     Acute renal failure  2.7cm possible mass right kidney   -consult nephrology, Dr Jessica German  renal US-no hydronephrosis. 2.7cm area central right kidney think likely normal parenchyma but mildly different echogenicity. Can't exclude mass. Consider CT    IV hydrate NS 125cc/hr , slowed to 75cc/hr and follow serial BUN/cr  Renal fx was nl 4/24/21 as per [facility] no rhabdo  Essential HTN  -continue atenolol 25mg po BID  BP still elevated , NO ACEI bc RF, will trial Norvasc 10 mg p.o. daily    Hypokalemia  K corrected to 3.5. Continue to follow.   Renal function to poor for any protocol for supplementation    Continue physical therapy continue IV hydration gentle NS 75 cc/h  Nephrology ordered multiple myeloma work-up      DVT Prophylaxis: heparin 5000 units subq Q8hr   Diet: DIET GENERAL;  Code Status: Full Code    PT/OT Eval Status: ordered, scored 13/24 SNF recommended     Dispo - lives with son.  Continue therapy if doesn't improve warrants SNF rehab     Jackelin Arzola MD

## 2021-05-28 LAB
ALBUMIN SERPL-MCNC: 2.7 G/DL (ref 3.1–4.9)
ALPHA-1-GLOBULIN: 0.5 G/DL (ref 0.2–0.4)
ALPHA-2-GLOBULIN: 1.6 G/DL (ref 0.4–1.1)
ANION GAP SERPL CALCULATED.3IONS-SCNC: 15 MMOL/L (ref 3–16)
BETA GLOBULIN: 0.9 G/DL (ref 0.9–1.6)
BUN BLDV-MCNC: 41 MG/DL (ref 7–20)
CALCIUM SERPL-MCNC: 8.7 MG/DL (ref 8.3–10.6)
CHLORIDE BLD-SCNC: 106 MMOL/L (ref 99–110)
CO2: 18 MMOL/L (ref 21–32)
CREAT SERPL-MCNC: 2.8 MG/DL (ref 0.6–1.2)
FERRITIN: 270.6 NG/ML (ref 15–150)
GAMMA GLOBULIN: 1.3 G/DL (ref 0.6–1.8)
GFR AFRICAN AMERICAN: 19
GFR NON-AFRICAN AMERICAN: 16
GLUCOSE BLD-MCNC: 105 MG/DL (ref 70–99)
HCT VFR BLD CALC: 25.6 % (ref 36–48)
HEMOGLOBIN: 8.5 G/DL (ref 12–16)
IRON SATURATION: 37 % (ref 15–50)
IRON: 71 UG/DL (ref 37–145)
KAPPA, FREE LIGHT CHAINS, SERUM: 100.83 MG/L (ref 3.3–19.4)
KAPPA/LAMBDA RATIO: 0.34 (ref 0.26–1.65)
KAPPA/LAMBDA TEST COMMENT: ABNORMAL
LAMBDA, FREE LIGHT CHAINS, SERUM: 294.14 MG/L (ref 5.71–26.3)
M SPIKE 1 SERUM PROTEIN ELEC: 0.5 G/DL
MCH RBC QN AUTO: 25.9 PG (ref 26–34)
MCHC RBC AUTO-ENTMCNC: 33.3 G/DL (ref 31–36)
MCV RBC AUTO: 77.7 FL (ref 80–100)
OCCULT BLOOD SCREENING: ABNORMAL
PDW BLD-RTO: 15.1 % (ref 12.4–15.4)
PLATELET # BLD: 481 K/UL (ref 135–450)
PMV BLD AUTO: 7.3 FL (ref 5–10.5)
POTASSIUM SERPL-SCNC: 3 MMOL/L (ref 3.5–5.1)
RBC # BLD: 3.29 M/UL (ref 4–5.2)
SODIUM BLD-SCNC: 139 MMOL/L (ref 136–145)
SPE/IFE INTERPRETATION: NORMAL
TOTAL IRON BINDING CAPACITY: 193 UG/DL (ref 260–445)
TOTAL PROTEIN: 7 G/DL (ref 6.4–8.2)
WBC # BLD: 7.7 K/UL (ref 4–11)

## 2021-05-28 PROCEDURE — 2580000003 HC RX 258: Performed by: HOSPITALIST

## 2021-05-28 PROCEDURE — 82728 ASSAY OF FERRITIN: CPT

## 2021-05-28 PROCEDURE — 83540 ASSAY OF IRON: CPT

## 2021-05-28 PROCEDURE — G0328 FECAL BLOOD SCRN IMMUNOASSAY: HCPCS

## 2021-05-28 PROCEDURE — 85027 COMPLETE CBC AUTOMATED: CPT

## 2021-05-28 PROCEDURE — 6370000000 HC RX 637 (ALT 250 FOR IP): Performed by: HOSPITALIST

## 2021-05-28 PROCEDURE — 36415 COLL VENOUS BLD VENIPUNCTURE: CPT

## 2021-05-28 PROCEDURE — 2580000003 HC RX 258: Performed by: INTERNAL MEDICINE

## 2021-05-28 PROCEDURE — 1200000000 HC SEMI PRIVATE

## 2021-05-28 PROCEDURE — 83550 IRON BINDING TEST: CPT

## 2021-05-28 PROCEDURE — 6360000002 HC RX W HCPCS: Performed by: HOSPITALIST

## 2021-05-28 PROCEDURE — 6370000000 HC RX 637 (ALT 250 FOR IP): Performed by: INTERNAL MEDICINE

## 2021-05-28 PROCEDURE — 80048 BASIC METABOLIC PNL TOTAL CA: CPT

## 2021-05-28 RX ORDER — HYDRALAZINE HYDROCHLORIDE 25 MG/1
25 TABLET, FILM COATED ORAL EVERY 12 HOURS SCHEDULED
Status: DISCONTINUED | OUTPATIENT
Start: 2021-05-28 | End: 2021-06-03

## 2021-05-28 RX ADMIN — CARBIDOPA AND LEVODOPA 1 TABLET: 10; 100 TABLET ORAL at 09:18

## 2021-05-28 RX ADMIN — HEPARIN SODIUM 5000 UNITS: 5000 INJECTION INTRAVENOUS; SUBCUTANEOUS at 05:41

## 2021-05-28 RX ADMIN — HYDRALAZINE HYDROCHLORIDE 25 MG: 25 TABLET, FILM COATED ORAL at 18:03

## 2021-05-28 RX ADMIN — AMLODIPINE BESYLATE 10 MG: 5 TABLET ORAL at 09:18

## 2021-05-28 RX ADMIN — POTASSIUM BICARBONATE 20 MEQ: 782 TABLET, EFFERVESCENT ORAL at 20:58

## 2021-05-28 RX ADMIN — HEPARIN SODIUM 5000 UNITS: 5000 INJECTION INTRAVENOUS; SUBCUTANEOUS at 20:59

## 2021-05-28 RX ADMIN — HEPARIN SODIUM 5000 UNITS: 5000 INJECTION INTRAVENOUS; SUBCUTANEOUS at 13:11

## 2021-05-28 RX ADMIN — CARBIDOPA AND LEVODOPA 1 TABLET: 10; 100 TABLET ORAL at 20:58

## 2021-05-28 RX ADMIN — ATORVASTATIN CALCIUM 10 MG: 10 TABLET, FILM COATED ORAL at 09:17

## 2021-05-28 RX ADMIN — SODIUM CHLORIDE: 9 INJECTION, SOLUTION INTRAVENOUS at 11:06

## 2021-05-28 RX ADMIN — CEFTRIAXONE SODIUM 1000 MG: 1 INJECTION, POWDER, FOR SOLUTION INTRAMUSCULAR; INTRAVENOUS at 09:19

## 2021-05-28 RX ADMIN — SODIUM CHLORIDE, PRESERVATIVE FREE 10 ML: 5 INJECTION INTRAVENOUS at 20:59

## 2021-05-28 RX ADMIN — PRIMIDONE 50 MG: 50 TABLET ORAL at 20:58

## 2021-05-28 RX ADMIN — ATENOLOL 25 MG: 25 TABLET ORAL at 20:58

## 2021-05-28 RX ADMIN — CITALOPRAM HYDROBROMIDE 20 MG: 20 TABLET ORAL at 09:17

## 2021-05-28 RX ADMIN — ATENOLOL 25 MG: 25 TABLET ORAL at 09:18

## 2021-05-28 RX ADMIN — POTASSIUM BICARBONATE 20 MEQ: 782 TABLET, EFFERVESCENT ORAL at 13:09

## 2021-05-28 ASSESSMENT — PAIN SCALES - GENERAL: PAINLEVEL_OUTOF10: 0

## 2021-05-28 NOTE — PROGRESS NOTES
potassium bicarb-citric acid  20 mEq Oral BID    amLODIPine  10 mg Oral Daily    atenolol  25 mg Oral BID    carbidopa-levodopa  1 tablet Oral BID    citalopram  20 mg Oral Daily    primidone  50 mg Oral Nightly    atorvastatin  10 mg Oral Daily    sodium chloride flush  5-40 mL Intravenous 2 times per day    heparin (porcine)  5,000 Units Subcutaneous 3 times per day    cefTRIAXone (ROCEPHIN) IV  1,000 mg Intravenous Daily     PRN Meds: sodium chloride flush, sodium chloride, promethazine **OR** ondansetron, polyethylene glycol, acetaminophen **OR** acetaminophen, oxyCODONE, morphine      Intake/Output Summary (Last 24 hours) at 5/28/2021 1651  Last data filed at 5/28/2021 1313  Gross per 24 hour   Intake 1552 ml   Output    Net 1552 ml       Physical Exam Performed:    BP (!) 160/67   Pulse 67   Temp 98 °F (36.7 °C)   Resp 18   Ht 5' 4\" (1.626 m)   Wt 145 lb (65.8 kg)   SpO2 97%   BMI 24.89 kg/m²   General appearance:  sleeping awoke to touch  tremors again pronounced and constant around mouth and to arms  HEENT:  PERRL   EOMI mucosa moist   Neck: Supple,  full range of motion. No JVD Trachea midline. Respiratory:  Normal  effort. Clear to auscultation,  Cardiovascular:  Regular rate and rhythm with normal S1/S2 without murmurs, rubs or gallops. Abdomen: Soft, non-tender, non-distended with normal bowel sounds. Musculoskeletal:  No clubbing, cyanosis or edema bilaterally. Skin: Skin color pale   No rashes or lesions.   Neurologic: nonfocal WEATHERS no cogwheeling Cranial nerves: II-XII intact, grossly non-focal. Resting tremors   Psychiatric:  Alert and oriented at baseline    bedpan as needed   Peripheral Pulses: +2 palpable, equal bilaterally       Labs:   Recent Labs     05/26/21  0705 05/28/21  0958   WBC 10.1 7.7   HGB 9.3* 8.5*   HCT 29.1* 25.6*    481*     Recent Labs     05/26/21  0705 05/27/21  0618 05/28/21  0958    138 139   K 3.1* 3.5 3.0*    106 106   CO2 21 20* 18*   BUN 60* 51* 41*   CREATININE 4.0* 3.5* 2.8*   CALCIUM 8.7 8.5 8.7     No results for input(s): AST, ALT, BILIDIR, BILITOT, ALKPHOS in the last 72 hours. No results for input(s): INR in the last 72 hours. Recent Labs     05/26/21  0705   CKTOTAL 41       Urinalysis:      Lab Results   Component Value Date    NITRU Negative 05/25/2021    WBCUA 21-50 05/25/2021    BACTERIA Rare 05/25/2021    RBCUA 3-4 05/25/2021    BLOODU TRACE-INTACT 05/25/2021    SPECGRAV 1.010 05/25/2021    GLUCOSEU Negative 05/25/2021       Radiology:  US RENAL COMPLETE   Final Result   No hydronephrosis      2.7 cm region in the central right kidney mildly different echogenicity than   the remainder of the renal parenchyma. This most likely represents normal   renal parenchyma but a mass could not be excluded. A CT of the kidneys would   be helpful for further evaluation. Otherwise, unremarkable appearing kidneys and bladder         XR CHEST PORTABLE   Final Result   Bibasilar atelectasis or, less likely, pneumonia. Assessment/Plan:    Active Hospital Problems    Diagnosis     Acute urinary tract infection [N39.0]     Parkinson's disease ([address] [G20]     Recurrent falls [R29.6]    Parkinson's disease    tremors   Recurrent falls  PT OT  continue sinemet 10-100mg po BID and Primidone      Sepsis  fever   Hypotension   T 100.7 BP 88/44 hypotensive fever now defervesced  BP now HTN   Acute UTI  Rocephin 1Gm IV daily   urine cx 50,000 mixed mechelle. Day 3 Rocephin will give 3-5day IV course     Acute renal failure  2.7cm possible mass right kidney   -consult nephrology, Dr Ken Ramos  renal US-no hydronephrosis. 2.7cm area central right kidney think likely normal parenchyma but mildly different echogenicity. Can't exclude mass.  Consider CT    IV hydrate NS 125cc/hr , slowed to 75cc/hr and follow serial BUN/cr  Renal fx was nl 4/24/21 as per [facility] no rhabdo  Cr has fallen to 2.8   MM workup has kappa lambda light chains ? Mult myeloma   Essential HTN  atenolol 25mg po BID  NO ACEI bc RF,   Added Norvasc 10 mg p.o. daily but bP still 160's   Try hydralazine 25mg po Q12hr     Hypokalemia  Effervescent potassium 20meq po x2 doses for k 3.0      Of note nephrologist ordered MM workup   Pt has high free kappa/lambda  light chains and and m-spike 0.5 up from prior 0.3     DVT Prophylaxis: heparin 5000 units subq Q8hr   Diet: DIET GENERAL;  Code Status: Full Code    PT/OT Eval Status: ordered, scored 13/24 [facility] recommended     Dispo - lives with son.  Continue therapy recommended and accepted at [facility] when medically ready       Ana Allred MD

## 2021-05-28 NOTE — PROGRESS NOTES
Admit Date: 5/25/2021      INTERVAL HISTORY  Resting   Creat 4-->3.5-->2.8  K 3.5-->3  Urine 1300-->  No fluid overload  Getting IV fluids   Urine P/C 1.1 G  Urine albumin only 107  This raise questions of myeloma   BP high     PLAN  Pending myeloma screen   Stop fluids   Replace K  Pt can be discharged when ready   Will follow up as out pt                       Acute Kidney Injury  JOAQUIN likely due to ATN  Cr on consultation - 4  Baseline Cr- 0.8 on 4/21                 urine Na 61  UA- 1.010 protein 30,WBC 21-50 RBC 3-4  Possible  UTI on abx  Renal Imaging:- 5/21- R- 11.2 cm,L- 12.3 cm   2.7 cm possible mass in right kidney  Otherwise normal looking kidneys  Echo: 2014: normal EF, Grade I DD     Hypertension   BP 88 systolic on initial admission        Parkinson's    sepsis         [facility] would like to thank Manpreet Coulter MD   for opportunity to serve this patient      Please call with questions at-   24 Hrs Answering service [phone] or  7 am- 5 pm via Perfect serve or cell phone  Jeyson Blackwell MD            CC/reason for consult :      JOAQUIN      HPI :      Desmond Ware is a 80 y.o. female presented to   the hospital on 5/25/2021 with fall. She has had  Multiple falls at home. She is unable to provide  Good details. No diarrhea,dizziness,vomiting. Denies poor po intake. Not on new medication. Denies urinary problem.     She was brought to ED, and was found to have   High BUN/cr, and also possible UTI.  Not known to  Have CKD, and she had normal creatinine on 4/21, at  [facility].     We are consulted for JOAQUIN and related issues.     ROS:      Seen with- RN     positives in bold   Constitutional:  fever, chills, weakness, weight change, fatigue  Skin:  rash, pruritus, hair loss, bruising, dry skin, petechiae  Head, Face, Neck   headaches, swelling,  cervical adenopathy  Respiratory: shortness of breath, cough, or wheezing  Cardiovascular: chest pain, palpitations, dizzy, edema Gastrointestinal: nausea, vomiting, diarrhea, constipation,belly pain    Yellow skin, blood in stool  Musculoskeletal:  back pain, muscle weakness, gait problems,       joint pain or swelling. Genitourinary:  dysuria, poor urine flow, flank pain, blood in urine  Neurologic:  vertigo, TIA'S, syncope, seizures, focal weakness  Psychosocial:  insomnia, anxiety, or depression. Additional positive findings: fall                     All other remaining systems are negative or unable to obtain            Objective:     Patient Vitals for the past 8 hrs:   BP Temp Temp src Pulse Resp SpO2   05/28/21 1200 (!) 164/72 97.9 °F (36.6 °C) Oral 66 18 95 %   05/28/21 0914 (!) 156/70 97.5 °F (36.4 °C) Oral 69 14 97 %   05/28/21 0451 (!) 157/66            I/O last 3 completed shifts: In: 1889 [P.O.:240;  I.V.:1649]  Out: -         General appearance:Awake, alert,   Neck: , no JVD and thyroid not enlarged,   Lungs: clear to auscultation bilaterally   Heart: regular rate and rhythm, S1, S2 normal, no murmur, click, rub or gallop  Abdomen: soft, non-tender; bowel sounds normal; no masses,  no organomegaly  Extremities: extremities normal, atraumatic, no edema  Skin: Skin color, texture, turgor normal. No rashes or lesions  Neurologic: Grossly normal     .l  Lab Results   Component Value Date    CREATININE 2.8 (H) 05/28/2021    BUN 41 (H) 05/28/2021     05/28/2021    K 3.0 (L) 05/28/2021     05/28/2021    CO2 18 (L) 05/28/2021     Lab Results   Component Value Date    WBC 7.7 05/28/2021    HGB 8.5 (L) 05/28/2021    HCT 25.6 (L) 05/28/2021    MCV 77.7 (L) 05/28/2021     (H) 05/28/2021            AGLUCOSE)Magnesium:    Lab Results   Component Value Date    MG 2.00 05/26/2021     Phosphorus:  No results found for: PHOS    Uric Acid:  No components found for: URIC    Active Problems:    Recurrent falls    Acute urinary tract infection    Parkinson's disease ([facility]  Resolved Problems:    * No resolved hospital problems.  *

## 2021-05-29 LAB
ANION GAP SERPL CALCULATED.3IONS-SCNC: 14 MMOL/L (ref 3–16)
BLOOD CULTURE, ROUTINE: NORMAL
BUN BLDV-MCNC: 32 MG/DL (ref 7–20)
CALCIUM SERPL-MCNC: 8.7 MG/DL (ref 8.3–10.6)
CHLORIDE BLD-SCNC: 105 MMOL/L (ref 99–110)
CO2: 19 MMOL/L (ref 21–32)
CREAT SERPL-MCNC: 2.5 MG/DL (ref 0.6–1.2)
CULTURE, BLOOD 2: NORMAL
GFR AFRICAN AMERICAN: 22
GFR NON-AFRICAN AMERICAN: 18
GLUCOSE BLD-MCNC: 146 MG/DL (ref 70–99)
MAGNESIUM: 1.6 MG/DL (ref 1.8–2.4)
POTASSIUM REFLEX MAGNESIUM: 3.2 MMOL/L (ref 3.5–5.1)
SODIUM BLD-SCNC: 138 MMOL/L (ref 136–145)

## 2021-05-29 PROCEDURE — 83735 ASSAY OF MAGNESIUM: CPT

## 2021-05-29 PROCEDURE — 80048 BASIC METABOLIC PNL TOTAL CA: CPT

## 2021-05-29 PROCEDURE — 6360000002 HC RX W HCPCS: Performed by: HOSPITALIST

## 2021-05-29 PROCEDURE — 6370000000 HC RX 637 (ALT 250 FOR IP): Performed by: HOSPITALIST

## 2021-05-29 PROCEDURE — 6370000000 HC RX 637 (ALT 250 FOR IP): Performed by: INTERNAL MEDICINE

## 2021-05-29 PROCEDURE — 2580000003 HC RX 258: Performed by: HOSPITALIST

## 2021-05-29 PROCEDURE — 1200000000 HC SEMI PRIVATE

## 2021-05-29 PROCEDURE — 36415 COLL VENOUS BLD VENIPUNCTURE: CPT

## 2021-05-29 RX ORDER — POTASSIUM CHLORIDE 750 MG/1
40 TABLET, EXTENDED RELEASE ORAL ONCE
Status: COMPLETED | OUTPATIENT
Start: 2021-05-29 | End: 2021-05-29

## 2021-05-29 RX ADMIN — ATORVASTATIN CALCIUM 10 MG: 10 TABLET, FILM COATED ORAL at 08:42

## 2021-05-29 RX ADMIN — SODIUM CHLORIDE, PRESERVATIVE FREE 10 ML: 5 INJECTION INTRAVENOUS at 21:57

## 2021-05-29 RX ADMIN — CITALOPRAM HYDROBROMIDE 20 MG: 20 TABLET ORAL at 08:42

## 2021-05-29 RX ADMIN — HEPARIN SODIUM 5000 UNITS: 5000 INJECTION INTRAVENOUS; SUBCUTANEOUS at 13:45

## 2021-05-29 RX ADMIN — POTASSIUM BICARBONATE 20 MEQ: 782 TABLET, EFFERVESCENT ORAL at 08:42

## 2021-05-29 RX ADMIN — ATENOLOL 25 MG: 25 TABLET ORAL at 08:42

## 2021-05-29 RX ADMIN — HYDRALAZINE HYDROCHLORIDE 25 MG: 25 TABLET, FILM COATED ORAL at 08:42

## 2021-05-29 RX ADMIN — ATENOLOL 25 MG: 25 TABLET ORAL at 21:56

## 2021-05-29 RX ADMIN — HEPARIN SODIUM 5000 UNITS: 5000 INJECTION INTRAVENOUS; SUBCUTANEOUS at 21:57

## 2021-05-29 RX ADMIN — POTASSIUM BICARBONATE 20 MEQ: 782 TABLET, EFFERVESCENT ORAL at 21:57

## 2021-05-29 RX ADMIN — HYDRALAZINE HYDROCHLORIDE 25 MG: 25 TABLET, FILM COATED ORAL at 21:56

## 2021-05-29 RX ADMIN — CEFTRIAXONE SODIUM 1000 MG: 1 INJECTION, POWDER, FOR SOLUTION INTRAMUSCULAR; INTRAVENOUS at 08:42

## 2021-05-29 RX ADMIN — CARBIDOPA AND LEVODOPA 1 TABLET: 10; 100 TABLET ORAL at 08:42

## 2021-05-29 RX ADMIN — AMLODIPINE BESYLATE 10 MG: 5 TABLET ORAL at 08:42

## 2021-05-29 RX ADMIN — PRIMIDONE 50 MG: 50 TABLET ORAL at 21:56

## 2021-05-29 RX ADMIN — CARBIDOPA AND LEVODOPA 1 TABLET: 10; 100 TABLET ORAL at 21:56

## 2021-05-29 RX ADMIN — POTASSIUM CHLORIDE 40 MEQ: 750 TABLET, EXTENDED RELEASE ORAL at 13:44

## 2021-05-29 RX ADMIN — MAGNESIUM GLUCONATE 500 MG ORAL TABLET 400 MG: 500 TABLET ORAL at 13:44

## 2021-05-29 RX ADMIN — HEPARIN SODIUM 5000 UNITS: 5000 INJECTION INTRAVENOUS; SUBCUTANEOUS at 05:30

## 2021-05-29 ASSESSMENT — PAIN SCALES - GENERAL
PAINLEVEL_OUTOF10: 0

## 2021-05-29 NOTE — PROGRESS NOTES
Admit Date: 5/25/2021      INTERVAL HISTORY  Awake   No complaints   Has parkinsonism   Creat 4-->2.8-->2.58  K 3.5-->3.2  Urine 1300-->  No fluid overload  Off IV fluids   Urine P/C 1.1 G  Urine albumin only 107  This raise questions of myeloma   BP high     PLAN  Pending myeloma screen     Pt can be discharged when ready   Will follow up as out pt                       Acute Kidney Injury  JOAQUIN likely due to ATN  Cr on consultation - 4  Baseline Cr- 0.8 on 4/21                 urine Na 61  UA- 1.010 protein 30,WBC 21-50 RBC 3-4  Possible  UTI on abx  Renal Imaging:- 5/21- R- 11.2 cm,L- 12.3 cm   2.7 cm possible mass in right kidney  Otherwise normal looking kidneys  Echo: 2014: normal EF, Grade I DD     Hypertension   BP 88 systolic on initial admission        Parkinson's    sepsis         [facility] would like to thank Adriana Jha MD   for opportunity to serve this patient      Please call with questions at-   24 Hrs Answering service [phone] or  7 am- 5 pm via Perfect serve or cell phone  Ashly Brownlee MD            CC/reason for consult :      JOAQUIN      HPI :      Gerry Montez is a 80 y.o. female presented to   the hospital on 5/25/2021 with fall. She has had  Multiple falls at home. She is unable to provide  Good details. No diarrhea,dizziness,vomiting. Denies poor po intake. Not on new medication. Denies urinary problem.     She was brought to ED, and was found to have   High BUN/cr, and also possible UTI.  Not known to  Have CKD, and she had normal creatinine on 4/21, at  [address] [facility].     We are consulted for JOAQUIN and related issues.     ROS:      Seen with- RN     positives in bold   Constitutional:  fever, chills, weakness, weight change, fatigue  Skin:  rash, pruritus, hair loss, bruising, dry skin, petechiae  Head, Face, Neck   headaches, swelling,  cervical adenopathy  Respiratory: shortness of breath, cough, or wheezing  Cardiovascular: chest pain, palpitations, dizzy, edema Gastrointestinal: nausea, vomiting, diarrhea, constipation,belly pain    Yellow skin, blood in stool  Musculoskeletal:  back pain, muscle weakness, gait problems,       joint pain or swelling. Genitourinary:  dysuria, poor urine flow, flank pain, blood in urine  Neurologic:  vertigo, TIA'S, syncope, seizures, focal weakness  Psychosocial:  insomnia, anxiety, or depression. Additional positive findings: fall                     All other remaining systems are negative or unable to obtain            Objective:     Patient Vitals for the past 8 hrs:   BP Temp Temp src Pulse Resp SpO2   05/29/21 0841 (!) 176/70 98.6 °F (37 °C) Oral 63 16 92 %   05/29/21 0346 (!) 166/71 98 °F (36.7 °C) Oral 69 18 96 %       I/O last 3 completed shifts: In: 200 [P.O.:180; I.V.:1432]  Out: -         General appearance:Awake, alert,   Neck: , no JVD and thyroid not enlarged,   Lungs: clear to auscultation bilaterally   Heart: regular rate and rhythm, S1, S2 normal, no murmur, click, rub or gallop  Abdomen: soft, non-tender; bowel sounds normal; no masses,  no organomegaly  Extremities: extremities normal, atraumatic, no edema  Skin: Skin color, texture, turgor normal. No rashes or lesions  Neurologic: Grossly normal     .l  Lab Results   Component Value Date    CREATININE 2.8 (H) 05/28/2021    BUN 41 (H) 05/28/2021     05/28/2021    K 3.0 (L) 05/28/2021     05/28/2021    CO2 18 (L) 05/28/2021     Lab Results   Component Value Date    WBC 7.7 05/28/2021    HGB 8.5 (L) 05/28/2021    HCT 25.6 (L) 05/28/2021    MCV 77.7 (L) 05/28/2021     (H) 05/28/2021            AGLUCOSE)Magnesium:    Lab Results   Component Value Date    MG 2.00 05/26/2021     Phosphorus:  No results found for: PHOS    Uric Acid:  No components found for: URIC    Active Problems:    Recurrent falls    Acute urinary tract infection    Parkinson's disease ([facility]  Resolved Problems:    * No resolved hospital problems.  *

## 2021-05-29 NOTE — PROGRESS NOTES
Hospitalist Progress Note      PCP: Shruthi Polanco MD    Date of Admission: 5/25/2021    Chief Complaint: Multiple falls at home c/o right sided [facility] Course: This is a 66-year-old female with a past medical history of hypertension, hyperlipidemia, coronary artery disease, peripheral neuropathy, tremors, history of CVA x2, Parkinson disease admitted with falls, acute kidney injury    Subjective: Patient is lying in the bed comfortable shaking her head denies any chest pain shortness of breath no nausea vomiting. Medications:  Reviewed    Infusion Medications    sodium chloride       Scheduled Medications    potassium bicarb-citric acid  20 mEq Oral BID    hydrALAZINE  25 mg Oral 2 times per day    amLODIPine  10 mg Oral Daily    atenolol  25 mg Oral BID    carbidopa-levodopa  1 tablet Oral BID    citalopram  20 mg Oral Daily    primidone  50 mg Oral Nightly    atorvastatin  10 mg Oral Daily    sodium chloride flush  5-40 mL Intravenous 2 times per day    heparin (porcine)  5,000 Units Subcutaneous 3 times per day    cefTRIAXone (ROCEPHIN) IV  1,000 mg Intravenous Daily     PRN Meds: sodium chloride flush, sodium chloride, promethazine **OR** ondansetron, polyethylene glycol, acetaminophen **OR** acetaminophen, oxyCODONE, morphine      Intake/Output Summary (Last 24 hours) at 5/29/2021 0923  Last data filed at 5/28/2021 1807  Gross per 24 hour   Intake 1612 ml   Output    Net 1612 ml       Physical Exam Performed:    BP (!) 176/70   Pulse 63   Temp 98.6 °F (37 °C) (Oral)   Resp 16   Ht 5' 4\" (1.626 m)   Wt 145 lb (65.8 kg)   SpO2 92%   BMI 24.89 kg/m²     General appearance: No apparent distress, appears stated age and cooperative. HEENT: Pupils equal, round, and reactive to light. Conjunctivae/corneas clear. Neck: Supple, with full range of motion. No jugular venous distention. Trachea midline. Respiratory:  Normal respiratory effort.  Clear to auscultation, bilaterally without Rales/Wheezes/Rhonchi. Cardiovascular: Regular rate and rhythm with normal S1/S2 without murmurs, rubs or gallops. Abdomen: Soft, non-tender, non-distended with normal bowel sounds. Musculoskeletal: No clubbing, cyanosis or edema bilaterally. Full range of motion without deformity. Skin: Skin color, texture, turgor normal.  No rashes or lesions. Neurologic:  Neurovascularly intact without any focal sensory/motor deficits. Cranial nerves: II-XII intact, grossly non-focal.  Psychiatric: Alert and oriented, thought content appropriate, normal insight  Capillary Refill: Brisk,3 seconds, normal   Peripheral Pulses: +2 palpable, equal bilaterally       Labs:   Recent Labs     05/28/21  0958   WBC 7.7   HGB 8.5*   HCT 25.6*   *     Recent Labs     05/27/21  0618 05/28/21  0958    139   K 3.5 3.0*    106   CO2 20* 18*   BUN 51* 41*   CREATININE 3.5* 2.8*   CALCIUM 8.5 8.7     No results for input(s): AST, ALT, BILIDIR, BILITOT, ALKPHOS in the last 72 hours. No results for input(s): INR in the last 72 hours. No results for input(s): [place] Vicky in the last 72 hours. Urinalysis:      Lab Results   Component Value Date    NITRU Negative 05/25/2021    WBCUA 21-50 05/25/2021    BACTERIA Rare 05/25/2021    RBCUA 3-4 05/25/2021    BLOODU TRACE-INTACT 05/25/2021    SPECGRAV 1.010 05/25/2021    GLUCOSEU Negative 05/25/2021       Radiology:  US RENAL COMPLETE   Final Result   No hydronephrosis      2.7 cm region in the central right kidney mildly different echogenicity than   the remainder of the renal parenchyma. This most likely represents normal   renal parenchyma but a mass could not be excluded. A CT of the kidneys would   be helpful for further evaluation. Otherwise, unremarkable appearing kidneys and bladder         XR CHEST PORTABLE   Final Result   Bibasilar atelectasis or, less likely, pneumonia.                  Assessment/Plan:    Active Hospital Problems Diagnosis     Acute urinary tract infection [N39.0]     Parkinson's disease ([address] [G20]     Recurrent falls [R29.6]      1. This is a 61-year-old female with a history of Parkinson disease, tremors admitted with recurrent falls physical Occupational Therapy consulted to continue with the Sinemet, primidone. 2.  Possible sepsis admitted on admission urine culture with mixed mechelle initially started on Rocephin will discontinue after 3 doses. 3.  Hypokalemia supplement the p.o. potassium. 4.  Hypomagnesemia started on magnesium supplement for 3 days. 5.  Acute renal failure 2.7 cm possible mass in the right kidney nephrology consulted and following. Renal ultrasound no hydronephrosis 2.7 cm area of central right kidney likely normal parenchymal but mildly different echogenicity cannot exclude mass consider CT scan without contrast.  6.  Hypertension controlled continue with current medication no ACE inhibitor because of acute kidney injury.         DVT Prophylaxis: Heparin subcu  Diet: DIET GENERAL;  Code Status: Full Code    PT/OT Eval Status: Consulted recommending SNF    Dispo -once okay with nephrology    Temi Humphries MD

## 2021-05-30 LAB
ANION GAP SERPL CALCULATED.3IONS-SCNC: 10 MMOL/L (ref 3–16)
ANION GAP SERPL CALCULATED.3IONS-SCNC: 11 MMOL/L (ref 3–16)
BUN BLDV-MCNC: 28 MG/DL (ref 7–20)
BUN BLDV-MCNC: 29 MG/DL (ref 7–20)
CALCIUM SERPL-MCNC: 8.5 MG/DL (ref 8.3–10.6)
CALCIUM SERPL-MCNC: 8.6 MG/DL (ref 8.3–10.6)
CHLORIDE BLD-SCNC: 103 MMOL/L (ref 99–110)
CHLORIDE BLD-SCNC: 106 MMOL/L (ref 99–110)
CO2: 23 MMOL/L (ref 21–32)
CO2: 25 MMOL/L (ref 21–32)
CREAT SERPL-MCNC: 2.3 MG/DL (ref 0.6–1.2)
CREAT SERPL-MCNC: 2.3 MG/DL (ref 0.6–1.2)
GFR AFRICAN AMERICAN: 24
GFR AFRICAN AMERICAN: 24
GFR NON-AFRICAN AMERICAN: 20
GFR NON-AFRICAN AMERICAN: 20
GLUCOSE BLD-MCNC: 95 MG/DL (ref 70–99)
GLUCOSE BLD-MCNC: 96 MG/DL (ref 70–99)
HCT VFR BLD CALC: 24.8 % (ref 36–48)
HEMOGLOBIN: 8.5 G/DL (ref 12–16)
MAGNESIUM: 1.6 MG/DL (ref 1.8–2.4)
MAGNESIUM: 1.7 MG/DL (ref 1.8–2.4)
MCH RBC QN AUTO: 26.1 PG (ref 26–34)
MCHC RBC AUTO-ENTMCNC: 34.3 G/DL (ref 31–36)
MCV RBC AUTO: 76 FL (ref 80–100)
PDW BLD-RTO: 14.7 % (ref 12.4–15.4)
PLATELET # BLD: 420 K/UL (ref 135–450)
PMV BLD AUTO: 6.5 FL (ref 5–10.5)
POTASSIUM REFLEX MAGNESIUM: 3.1 MMOL/L (ref 3.5–5.1)
POTASSIUM SERPL-SCNC: 3.2 MMOL/L (ref 3.5–5.1)
RBC # BLD: 3.26 M/UL (ref 4–5.2)
SODIUM BLD-SCNC: 138 MMOL/L (ref 136–145)
SODIUM BLD-SCNC: 140 MMOL/L (ref 136–145)
WBC # BLD: 6.8 K/UL (ref 4–11)

## 2021-05-30 PROCEDURE — 83735 ASSAY OF MAGNESIUM: CPT

## 2021-05-30 PROCEDURE — 6370000000 HC RX 637 (ALT 250 FOR IP): Performed by: HOSPITALIST

## 2021-05-30 PROCEDURE — 85027 COMPLETE CBC AUTOMATED: CPT

## 2021-05-30 PROCEDURE — 80048 BASIC METABOLIC PNL TOTAL CA: CPT

## 2021-05-30 PROCEDURE — 6360000002 HC RX W HCPCS: Performed by: PHYSICIAN ASSISTANT

## 2021-05-30 PROCEDURE — 36415 COLL VENOUS BLD VENIPUNCTURE: CPT

## 2021-05-30 PROCEDURE — 6370000000 HC RX 637 (ALT 250 FOR IP): Performed by: INTERNAL MEDICINE

## 2021-05-30 PROCEDURE — 6370000000 HC RX 637 (ALT 250 FOR IP): Performed by: PHYSICIAN ASSISTANT

## 2021-05-30 PROCEDURE — 1200000000 HC SEMI PRIVATE

## 2021-05-30 PROCEDURE — 2580000003 HC RX 258: Performed by: HOSPITALIST

## 2021-05-30 PROCEDURE — 6360000002 HC RX W HCPCS: Performed by: HOSPITALIST

## 2021-05-30 RX ORDER — MAGNESIUM SULFATE 1 G/100ML
1000 INJECTION INTRAVENOUS ONCE
Status: COMPLETED | OUTPATIENT
Start: 2021-05-30 | End: 2021-05-31

## 2021-05-30 RX ORDER — HYDRALAZINE HYDROCHLORIDE 25 MG/1
25 TABLET, FILM COATED ORAL EVERY 12 HOURS SCHEDULED
Qty: 90 TABLET | Refills: 3 | Status: SHIPPED
Start: 2021-05-30 | End: 2021-06-05 | Stop reason: HOSPADM

## 2021-05-30 RX ORDER — AMLODIPINE BESYLATE 10 MG/1
10 TABLET ORAL DAILY
Qty: 30 TABLET | Refills: 3 | Status: ON HOLD
Start: 2021-05-31 | End: 2021-06-09 | Stop reason: HOSPADM

## 2021-05-30 RX ORDER — POTASSIUM CHLORIDE 20 MEQ/1
40 TABLET, EXTENDED RELEASE ORAL ONCE
Status: DISCONTINUED | OUTPATIENT
Start: 2021-05-30 | End: 2021-05-31

## 2021-05-30 RX ADMIN — CITALOPRAM HYDROBROMIDE 20 MG: 20 TABLET ORAL at 10:04

## 2021-05-30 RX ADMIN — MAGNESIUM SULFATE HEPTAHYDRATE 1000 MG: 1 INJECTION, SOLUTION INTRAVENOUS at 22:39

## 2021-05-30 RX ADMIN — ATORVASTATIN CALCIUM 10 MG: 10 TABLET, FILM COATED ORAL at 10:03

## 2021-05-30 RX ADMIN — HEPARIN SODIUM 5000 UNITS: 5000 INJECTION INTRAVENOUS; SUBCUTANEOUS at 15:04

## 2021-05-30 RX ADMIN — HYDRALAZINE HYDROCHLORIDE 25 MG: 25 TABLET, FILM COATED ORAL at 21:53

## 2021-05-30 RX ADMIN — HYDRALAZINE HYDROCHLORIDE 25 MG: 25 TABLET, FILM COATED ORAL at 10:03

## 2021-05-30 RX ADMIN — POTASSIUM BICARBONATE 40 MEQ: 782 TABLET, EFFERVESCENT ORAL at 21:30

## 2021-05-30 RX ADMIN — HEPARIN SODIUM 5000 UNITS: 5000 INJECTION INTRAVENOUS; SUBCUTANEOUS at 05:55

## 2021-05-30 RX ADMIN — ATENOLOL 25 MG: 25 TABLET ORAL at 21:54

## 2021-05-30 RX ADMIN — MAGNESIUM GLUCONATE 500 MG ORAL TABLET 400 MG: 500 TABLET ORAL at 10:03

## 2021-05-30 RX ADMIN — CARBIDOPA AND LEVODOPA 1 TABLET: 10; 100 TABLET ORAL at 21:54

## 2021-05-30 RX ADMIN — PRIMIDONE 50 MG: 50 TABLET ORAL at 21:53

## 2021-05-30 RX ADMIN — POTASSIUM BICARBONATE 20 MEQ: 782 TABLET, EFFERVESCENT ORAL at 21:54

## 2021-05-30 RX ADMIN — AMLODIPINE BESYLATE 10 MG: 5 TABLET ORAL at 10:03

## 2021-05-30 RX ADMIN — CARBIDOPA AND LEVODOPA 1 TABLET: 10; 100 TABLET ORAL at 10:03

## 2021-05-30 RX ADMIN — SODIUM CHLORIDE, PRESERVATIVE FREE 10 ML: 5 INJECTION INTRAVENOUS at 10:03

## 2021-05-30 RX ADMIN — POTASSIUM BICARBONATE 20 MEQ: 782 TABLET, EFFERVESCENT ORAL at 10:03

## 2021-05-30 RX ADMIN — SODIUM CHLORIDE, PRESERVATIVE FREE 10 ML: 5 INJECTION INTRAVENOUS at 22:37

## 2021-05-30 RX ADMIN — HEPARIN SODIUM 5000 UNITS: 5000 INJECTION INTRAVENOUS; SUBCUTANEOUS at 21:55

## 2021-05-30 RX ADMIN — ATENOLOL 25 MG: 25 TABLET ORAL at 10:03

## 2021-05-30 ASSESSMENT — PAIN SCALES - GENERAL
PAINLEVEL_OUTOF10: 0

## 2021-05-30 NOTE — PROGRESS NOTES
Hospitalist Progress Note      PCP: Gladis Dale MD    Date of Admission: 5/25/2021    Chief Complaint: Multiple falls at home c/o right sided [facility] Course: This is a 66-year-old female with a past medical history of hypertension, hyperlipidemia, coronary artery disease, peripheral neuropathy, tremors, history of CVA x2, Parkinson disease admitted with falls, acute kidney injury    Subjective: Patient is sitting in a chair eating her breakfast denies any chest pain no shortness of breath no nausea vomiting. Continues to have poor p.o. intake. Medications:  Reviewed    Infusion Medications    sodium chloride       Scheduled Medications    magnesium oxide  400 mg Oral Daily    potassium bicarb-citric acid  20 mEq Oral BID    hydrALAZINE  25 mg Oral 2 times per day    amLODIPine  10 mg Oral Daily    atenolol  25 mg Oral BID    carbidopa-levodopa  1 tablet Oral BID    citalopram  20 mg Oral Daily    primidone  50 mg Oral Nightly    atorvastatin  10 mg Oral Daily    sodium chloride flush  5-40 mL Intravenous 2 times per day    heparin (porcine)  5,000 Units Subcutaneous 3 times per day     PRN Meds: sodium chloride flush, sodium chloride, promethazine **OR** ondansetron, polyethylene glycol, acetaminophen **OR** acetaminophen, oxyCODONE, morphine      Intake/Output Summary (Last 24 hours) at 5/30/2021 0949  Last data filed at 5/30/2021 0610  Gross per 24 hour   Intake 320 ml   Output    Net 320 ml       Physical Exam Performed:    /73   Pulse 63   Temp 99 °F (37.2 °C) (Oral)   Resp 20   Ht 5' 4\" (1.626 m)   Wt 145 lb (65.8 kg)   SpO2 96%   BMI 24.89 kg/m²     General appearance: No apparent distress, appears stated age and cooperative. HEENT: Pupils equal, round, and reactive to light. Conjunctivae/corneas clear. Neck: Supple, with full range of motion. No jugular venous distention. Trachea midline. Respiratory:  Normal respiratory effort.  Clear to auscultation, bilaterally without Rales/Wheezes/Rhonchi. Cardiovascular: Regular rate and rhythm with normal S1/S2 without murmurs, rubs or gallops. Abdomen: Soft, non-tender, non-distended with normal bowel sounds. Musculoskeletal: No clubbing, cyanosis or edema bilaterally. Full range of motion without deformity. Skin: Skin color, texture, turgor normal.  No rashes or lesions. Neurologic:  Neurovascularly intact without any focal sensory/motor deficits. Cranial nerves: II-XII intact, grossly non-focal.  Psychiatric: Alert and oriented, thought content appropriate, normal insight  Capillary Refill: Brisk,3 seconds, normal   Peripheral Pulses: +2 palpable, equal bilaterally       Labs:   Recent Labs     05/28/21  0958 05/30/21  0536   WBC 7.7 6.8   HGB 8.5* 8.5*   HCT 25.6* 24.8*   * 420     Recent Labs     05/28/21  0958 05/29/21  0941 05/30/21  0536    138 140   K 3.0* 3.2* 3.1*    105 106   CO2 18* 19* 23   BUN 41* 32* 29*   CREATININE 2.8* 2.5* 2.3*   CALCIUM 8.7 8.7 8.5     No results for input(s): AST, ALT, BILIDIR, BILITOT, ALKPHOS in the last 72 hours. No results for input(s): INR in the last 72 hours. No results for input(s): [place] Hind in the last 72 hours. Urinalysis:      Lab Results   Component Value Date    NITRU Negative 05/25/2021    WBCUA 21-50 05/25/2021    BACTERIA Rare 05/25/2021    RBCUA 3-4 05/25/2021    BLOODU TRACE-INTACT 05/25/2021    SPECGRAV 1.010 05/25/2021    GLUCOSEU Negative 05/25/2021       Radiology:  US RENAL COMPLETE   Final Result   No hydronephrosis      2.7 cm region in the central right kidney mildly different echogenicity than   the remainder of the renal parenchyma. This most likely represents normal   renal parenchyma but a mass could not be excluded. A CT of the kidneys would   be helpful for further evaluation.       Otherwise, unremarkable appearing kidneys and bladder         XR CHEST PORTABLE   Final Result   Bibasilar atelectasis or, less likely, pneumonia. Assessment/Plan:    Active Hospital Problems    Diagnosis     Acute urinary tract infection [N39.0]     Parkinson's disease ([facility] [address] [G20]     Recurrent falls [R29.6]      1. This is a 72-year-old female with a history of Parkinson disease, tremors admitted with recurrent falls physical Occupational Therapy consulted to continue with the Sinemet, primidone. 2.  Possible sepsis admitted on admission urine culture with mixed mechelle initially started on Rocephin  discontinued after 3 doses. 3.  Hypokalemia supplement the p.o. potassium. 4.  Hypomagnesemia started on magnesium supplement for 3 days on 5/29/21  5. Acute renal failure 2.7 cm possible mass in the right kidney nephrology consulted and following. Renal ultrasound no hydronephrosis 2.7 cm area of central right kidney likely normal parenchymal but mildly different echogenicity cannot exclude mass consider CT scan without contrast.  6.  Hypertension controlled continue with current medication on beta-blocker, hydralazine and Norvasc. 7.  Abnormal serum electrophoresis, worsening creatinine nephrology recommended oncology consult, done, will hold on discharge till cleared from oncology.     DVT Prophylaxis: Heparin subcu  Diet: DIET GENERAL;  Code Status: Full Code    PT/OT Eval Status: Consulted recommending SNF    Dispo -once okay with nephrology and oncology    Vianca Diego MD

## 2021-05-30 NOTE — PROGRESS NOTES
Slow improvement in renal function   SPEP  M-protein persists in the gamma region on serum protein   electrophoresis.  The amount of this protein is increased to 0.5 gm/dL   from 0.3 gm/dL since the assay performed on 7/28/17    Free K/L 0.34    Please consider hematology consult

## 2021-05-30 NOTE — PROGRESS NOTES
Physical Therapy    Physical therapy attempted to treat this patient. However the patient was in the shower with her PCA. Patient's  requested that therapy return some other time. PT will re-attempt to treat this patient as able. Thank you.      Suan Nissen PT

## 2021-05-30 NOTE — CARE COORDINATION
CASE MANAGEMENT DISCHARGE SUMMARY    Discharge to: [facility]    Precertification completed: yes  Hospital Exemption Notification (HENS) completed: yes    New Durable Medical Equipment ordered/agency: differ    Transportation:    Family/car: via private car son 1:30pm     Confirmed discharge plan with: Patient and Whitleyetn son     Facility/Agency, name:  [facility]    Phone number for report to facility: [phone]    RN, name: Sheila Moise RN     Note: Discharging nurse to complete FRENCH, reconcile AVS, and place final copy with patient's discharge packet. RN to ensure that written prescriptions for  Level II medications are sent with patient to the facility as per protocol. KOBY Espinoza    21 [phone]: Spoke with Sheila Moise RN cancel dc this date re not medically ready for dc at this time. KOBY Espinoza

## 2021-05-31 LAB
ANION GAP SERPL CALCULATED.3IONS-SCNC: 9 MMOL/L (ref 3–16)
BUN BLDV-MCNC: 27 MG/DL (ref 7–20)
CALCIUM SERPL-MCNC: 8.8 MG/DL (ref 8.3–10.6)
CHLORIDE BLD-SCNC: 104 MMOL/L (ref 99–110)
CO2: 26 MMOL/L (ref 21–32)
CREAT SERPL-MCNC: 2.1 MG/DL (ref 0.6–1.2)
GFR AFRICAN AMERICAN: 27
GFR NON-AFRICAN AMERICAN: 22
GLUCOSE BLD-MCNC: 87 MG/DL (ref 70–99)
POTASSIUM REFLEX MAGNESIUM: 3.8 MMOL/L (ref 3.5–5.1)
SODIUM BLD-SCNC: 139 MMOL/L (ref 136–145)

## 2021-05-31 PROCEDURE — 80048 BASIC METABOLIC PNL TOTAL CA: CPT

## 2021-05-31 PROCEDURE — 6370000000 HC RX 637 (ALT 250 FOR IP): Performed by: HOSPITALIST

## 2021-05-31 PROCEDURE — 1200000000 HC SEMI PRIVATE

## 2021-05-31 PROCEDURE — C9113 INJ PANTOPRAZOLE SODIUM, VIA: HCPCS | Performed by: HOSPITALIST

## 2021-05-31 PROCEDURE — 6370000000 HC RX 637 (ALT 250 FOR IP): Performed by: INTERNAL MEDICINE

## 2021-05-31 PROCEDURE — 36415 COLL VENOUS BLD VENIPUNCTURE: CPT

## 2021-05-31 PROCEDURE — 99221 1ST HOSP IP/OBS SF/LOW 40: CPT | Performed by: INTERNAL MEDICINE

## 2021-05-31 PROCEDURE — 2580000003 HC RX 258: Performed by: HOSPITALIST

## 2021-05-31 PROCEDURE — 6360000002 HC RX W HCPCS: Performed by: HOSPITALIST

## 2021-05-31 PROCEDURE — 97110 THERAPEUTIC EXERCISES: CPT

## 2021-05-31 PROCEDURE — 97116 GAIT TRAINING THERAPY: CPT

## 2021-05-31 RX ORDER — PANTOPRAZOLE SODIUM 40 MG/10ML
40 INJECTION, POWDER, LYOPHILIZED, FOR SOLUTION INTRAVENOUS EVERY 12 HOURS
Status: DISCONTINUED | OUTPATIENT
Start: 2021-05-31 | End: 2021-06-01

## 2021-05-31 RX ADMIN — ATENOLOL 25 MG: 25 TABLET ORAL at 22:22

## 2021-05-31 RX ADMIN — MAGNESIUM GLUCONATE 500 MG ORAL TABLET 400 MG: 500 TABLET ORAL at 08:18

## 2021-05-31 RX ADMIN — CARBIDOPA AND LEVODOPA 1 TABLET: 10; 100 TABLET ORAL at 22:21

## 2021-05-31 RX ADMIN — HEPARIN SODIUM 5000 UNITS: 5000 INJECTION INTRAVENOUS; SUBCUTANEOUS at 06:58

## 2021-05-31 RX ADMIN — AMLODIPINE BESYLATE 10 MG: 5 TABLET ORAL at 08:18

## 2021-05-31 RX ADMIN — POTASSIUM BICARBONATE 20 MEQ: 782 TABLET, EFFERVESCENT ORAL at 22:21

## 2021-05-31 RX ADMIN — HEPARIN SODIUM 5000 UNITS: 5000 INJECTION INTRAVENOUS; SUBCUTANEOUS at 16:05

## 2021-05-31 RX ADMIN — SODIUM CHLORIDE, PRESERVATIVE FREE 10 ML: 5 INJECTION INTRAVENOUS at 08:18

## 2021-05-31 RX ADMIN — PANTOPRAZOLE SODIUM 40 MG: 40 INJECTION, POWDER, FOR SOLUTION INTRAVENOUS at 16:05

## 2021-05-31 RX ADMIN — POTASSIUM BICARBONATE 20 MEQ: 782 TABLET, EFFERVESCENT ORAL at 08:18

## 2021-05-31 RX ADMIN — CARBIDOPA AND LEVODOPA 1 TABLET: 10; 100 TABLET ORAL at 08:18

## 2021-05-31 RX ADMIN — CITALOPRAM HYDROBROMIDE 20 MG: 20 TABLET ORAL at 08:18

## 2021-05-31 RX ADMIN — ATENOLOL 25 MG: 25 TABLET ORAL at 08:18

## 2021-05-31 RX ADMIN — HYDRALAZINE HYDROCHLORIDE 25 MG: 25 TABLET, FILM COATED ORAL at 08:18

## 2021-05-31 RX ADMIN — HEPARIN SODIUM 5000 UNITS: 5000 INJECTION INTRAVENOUS; SUBCUTANEOUS at 22:22

## 2021-05-31 RX ADMIN — PRIMIDONE 50 MG: 50 TABLET ORAL at 22:21

## 2021-05-31 RX ADMIN — ATORVASTATIN CALCIUM 10 MG: 10 TABLET, FILM COATED ORAL at 08:18

## 2021-05-31 RX ADMIN — SODIUM CHLORIDE, PRESERVATIVE FREE 10 ML: 5 INJECTION INTRAVENOUS at 22:28

## 2021-05-31 RX ADMIN — HYDRALAZINE HYDROCHLORIDE 25 MG: 25 TABLET, FILM COATED ORAL at 22:22

## 2021-05-31 ASSESSMENT — PAIN SCALES - GENERAL
PAINLEVEL_OUTOF10: 0
PAINLEVEL_OUTOF10: 0

## 2021-05-31 NOTE — PROGRESS NOTES
Contacted by patient's nurse regarding NSVT of 11 beats. BMP and Mg ordered which revealed K 3.2 and Mg 1.7. Patient is on Effer-K 20 mEq PO BID and Mag-Ox 400 mg daily. Will add Effer-K 40 mEq PO x 1 and Mg sulfate 1g IV x 1.      Cholo Aviles PA-C

## 2021-05-31 NOTE — CONSULTS
ONCOLOGY HEMATOLOGY CARE CONSULT NOTE      Requesting Physician:  Dr. Billy Hernandez:  IgG lambda monoclonal gammopathy        HISTORY OF PRESENT ILLNESS:      Ms. Loyd Hackett  is a 80 y.o. female we are seeing in consultation for an IgG lambda monoclonal gammopathy. This patient had a monoclonal IgG lambda identified on 7/31/2017. M spike was 0.3 g/dL at that time. On 5/25/2021, M spike was 0.5 g/dL. Kappa/lambda ratio was 0.34 (WNL). Labs on 4/05/2021 at [facility] showed: WBC 6.9 K/mcL, HGB 11.3 g/dL,  K/mcL, creat 0.76 mg/dL, Ca 9.4 mg/dL, alb 4.1 g/dL. Admit labs on 5/25/2021 showed: WBC 11.1 K/mcL, HGB 10.4 g/dL, HCT 31.5%,  K/mcL, creat 4.0 mg/dL, Ca 8.6 mg/dL. Micronutrients: Ferritin 270.6 ng/mL, iron sat 37%. FOBT pos, 5/28/2021.     Past Medical History:    Past Medical History:   Diagnosis Date    Arthritis     Asthma     past hx    Cancer ([address]     skin    Hyperlipidemia     Hypertension     Tremors of nervous system     Unspecified cerebral artery occlusion with cerebral infarction     X 2-      Past Surgical History:    Past Surgical History:   Procedure Laterality Date    BACK SURGERY      CARPAL TUNNEL RELEASE Bilateral     CATARACT REMOVAL WITH IMPLANT Left 01/08/2018    CHOLECYSTECTOMY      HERNIA REPAIR      INTRACAPSULAR CATARACT EXTRACTION Right 1/28/2019    PHACOEMULSIFICATION OF CATARACT RIGHT EYE WITH INTRAOCULAR LENS IMPLANT --BRANDON=4-- performed by Ronaldo Lawrence MD at [address]      and polyps from \"throat\"       Current Medications:    Current Facility-Administered Medications   Medication Dose Route Frequency Provider Last Rate Last Admin    potassium chloride (KLOR-CON M) extended release tablet 40 mEq  40 mEq Oral Once Honor Goldberg, PA-C        potassium bicarb-citric acid (EFFER-K) effervescent tablet 20 mEq  20 mEq Oral BID Rasheeda Beckman MD   20 mEq at 05/31/21 0818    hydrALAZINE (APRESOLINE) tablet 25 mg  25 mg Oral 2 times per day Raul Salazar MD   25 mg at 05/31/21 0818    amLODIPine (NORVASC) tablet 10 mg  10 mg Oral Daily Raul Salazar MD   10 mg at 05/31/21 0818    atenolol (TENORMIN) tablet 25 mg  25 mg Oral BID Raul Salazar MD   25 mg at 05/31/21 0818    carbidopa-levodopa (SINEMET)  MG per tablet 1 tablet  1 tablet Oral BID Raul Salazar MD   1 tablet at 05/31/21 0818    citalopram (CELEXA) tablet 20 mg  20 mg Oral Daily Raul Salazar MD   20 mg at 05/31/21 0818    primidone (MYSOLINE) tablet 50 mg  50 mg Oral Nightly Raul Salazar MD   50 mg at 05/30/21 2153    atorvastatin (LIPITOR) tablet 10 mg  10 mg Oral Daily Raul Salazar MD   10 mg at 05/31/21 0818    sodium chloride flush 0.9 % injection 5-40 mL  5-40 mL Intravenous 2 times per day Raul Salazar MD   10 mL at 05/31/21 0818    sodium chloride flush 0.9 % injection 5-40 mL  5-40 mL Intravenous PRN Rual Salazar MD        0.9 % sodium chloride infusion  25 mL Intravenous PRN Raul Salazar MD        heparin (porcine) injection 5,000 Units  5,000 Units Subcutaneous 3 times per day Raul Salazar MD   5,000 Units at 05/31/21 0658    promethazine (PHENERGAN) tablet 12.5 mg  12.5 mg Oral Q6H PRN Raul Salazar MD        Or    ondansetron [facility]) injection 4 mg  4 mg Intravenous Q6H PRN Raul Salazar MD        polyethylene glycol [facility]) packet 17 g  17 g Oral Daily PRN Raul Salazar MD        acetaminophen (TYLENOL) tablet 650 mg  650 mg Oral Q6H PRN Raul Salazar MD   650 mg at 05/25/21 2052    Or    acetaminophen (TYLENOL) suppository 650 mg  650 mg Rectal Q6H PRN Raul Salazar MD        oxyCODONE (ROXICODONE) immediate release tablet 5 mg  5 mg Oral Q4H PRN Raul Salazar MD        morphine (PF) injection 2 mg  2 mg Intravenous Q6H PRN Raul Salazar MD         Facility-Administered Medications Ordered in Other Encounters   Medication Dose Route Frequency Provider Last Rate Last XCHNT    cooperative, no apparent distress, and appears stated age NAD  EYES:  pupils equal, round and reactive to light, extra ocular muscles intact,sclera clear, conjunctiva normal  NECK:  Supple, symmetrical, trachea midline, no adenopathy, thyroid symmetric, not enlarged and no tenderness, skin normal  HEMATOLOGIC/LYMPHATICS:  no cervical lymphadenopathy, nosupraclavicular lymphadenopathy, no axillary lymphadenopathy and no inguinal lymphadenopathy  LUNGS:  No increased work of breathing, good air exchange, clear to auscultation bilaterally, no crackles or wheezing  CARDIOVASCULAR:  , regular rate and rhythm, normal S1 and S2, no S3 or S4, and no murmur noted  ABDOMEN:  No scars, normal bowel sounds, soft, non-distended, non-tender, no masses palpated, no hepatosplenomegally      MUSCULOSKELETAL:  There is no redness, warmth, or swelling of the joints. Full range of motion noted. Motor strength is 5 out of 5 all extremities bilaterally. NEUROLOGIC:  Awake, alert, oriented to name, place andtime. Cranial nerves II-XII are grossly intact. Motor is 5 out of 5 bilaterally. SKIN:  no bruising or bleeding      DATA:    PT/INR:  No results for input(s): PROT, INR in the last 72 hours. PTT:No results for input(s): APTT in the last 72 hours.   CMP:    Lab Results   Component Value Date     05/31/2021    K 3.8 05/31/2021     05/31/2021    CO2 26 05/31/2021    BUN 27 05/31/2021    PROT 7.2 05/25/2021     Magnesium:    Lab Results   Component Value Date    MG 1.70 05/30/2021     Phosphorus:  No components found for: PO4  Calcium:  No components found for: CA  CBC:    Lab Results   Component Value Date    WBC 6.8 05/30/2021    RBC 3.26 05/30/2021    HGB 8.5 05/30/2021    HCT 24.8 05/30/2021    MCV 76.0 05/30/2021    RDW 14.7 05/30/2021     05/30/2021     DIFF:    Lab Results   Component Value Date    MCV 76.0 05/30/2021    RDW 14.7 05/30/2021      LDH:  @labcrnt(LDH)@  Uric Acid:  @labcrnt(URIC)@    Radiology Review:  US RENAL COMPLETE (5/25/2021): Impression   No hydronephrosis       2.7 cm region in the central right kidney mildly different echogenicity than   the remainder of the renal parenchyma.  This most likely represents normal   renal parenchyma but a mass could not be excluded.  A CT of the kidneys would   be helpful for further evaluation.       Otherwise, unremarkable appearing kidneys and bladder             Problem List  Patient Active Problem List   Diagnosis    Essential and other specified forms of tremor    Hereditary and idiopathic peripheral neuropathy    Abnormality of gait    Essential hypertension    Mixed hyperlipidemia    Dysthymia    Polyneuropathy    Essential hypertension    CAD in native artery    Recurrent falls    Acute urinary tract infection    Parkinson's disease ([facility] [address]       IMPRESSION/RECOMMENDATIONS:  1.) IgG lambda monoclonal gammopathy  - An IgG-type paraprotein <1 g/dL with normal kappa/lambda ratio is low risk. Unlikely to be myeloma. - The patient is anemic and in renal failure. The fact that her labs were normal 1 month ago and her M spike is so low suggests an acute event other than myeloma. Still myeloma is possible. - Could get a bone marrow if the patient/family is interested. Doubt there will be a large population of plasma cells that will define active myeloma by itself. - I offered a bone marrow biopsy and she declined. 2.) Anemia  - In the setting of FOBT pos.   - Might have GI see her as well. Thank you for asking me to see the patient.        Ev Etienne MD  PleaseContact Through [employer]

## 2021-05-31 NOTE — PLAN OF CARE
Protect patient from fall injury by keeping bed in low position, use of non skid socks and bed alarm system. Keep belongings and call light with reach at all times and following fall protocol. Monitor Telemetry for irregular beets, monitor labs and treat as ordered.

## 2021-05-31 NOTE — CONSULTS
[address] ,  [address] [facility]  Phone: [phone]   WOU:[phone]  [address] [place] [address],  [address] [place] [address]  Phone: [phone]   MORGAN:[phone]    Gastroenterology H&P/Consult Note    Chief Complaint   Patient presents with    Fall     Patient comes into ED via [facility] from home with c/o multiple falls at home. Patient now c/o right side pain. HPI     Thank you No ref. provider found and Glenny Sullivan MD for asking me to see Barbara Pedraza in consultation. She is a 80y.o. 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 The documented principal problem is <principal problem not specified> and chief complaint is Fall (Patient comes into ED via [facility] from home with c/o multiple falls at home. Patient now c/o right side pain. )  . Date of Admission:  5/25/2021  Date of Consultation:  5/31/2021    Patient admitted status post multiple falls. Labs on presentation noted acute kidney injury with elevated BUN 69, creatinine 4, positive UA for UTI. Hemoglobin 10.1, hematocrit 31.2, platelets 544. WBC 11.1. Iron studies noted elevated ferritin 270, normal iron 71, iron saturation 37 low TIBC 193. Denies abdominal pain, nausea, vomiting, fever, chills, constipation, diarrhea, hematochezia or melenic stools. Labs on 4/5/2021 noted hemoglobin 11.3, BUN 22, creatinine 0.76. Patient is currently being worked up for IgG lambda monoclonal gammopathy. GI is consulted for acute on chronic microcytic anemia Hgb 10.4 g/dL on 5/25/21 with occult positive stool. Last Encounter Reviewed: None  Pertinent PMH, FH, SH is reviewed below.   Last EGD: Uknown  Last Colonoscopy: Unknown    Health Maintenance   Topic Date Due    Lipid screen  Never done  Shingles Vaccine (1 of 2) Never done    Potassium monitoring  05/31/2022    Creatinine monitoring  05/31/2022    DTaP/Tdap/Td vaccine (2 - Td) 04/12/2025    Flu vaccine  Completed    Pneumococcal 65+ years Vaccine  Completed    COVID-19 Vaccine  Completed    Hepatitis A vaccine  Aged Out    Hepatitis B vaccine  Aged Out    Hib vaccine  Aged Out    Meningococcal (ACWY) vaccine  Aged Out     PAST MEDICAL HISTORY     Past Medical History:   Diagnosis Date    Arthritis     Asthma     past hx    Cancer ([address]     skin    Hyperlipidemia     Hypertension     Tremors of nervous system     Unspecified cerebral artery occlusion with cerebral infarction     X 2-      FAMILY HISTORY     Family History   Problem Relation Age of Onset    Heart Disease Mother     Diabetes Father      SOCIAL HISTORY     Social History     Tobacco Use    Smoking status: Never Smoker    Smokeless tobacco: Never Used   Vaping Use    Vaping Use: Never used   Substance Use Topics    Alcohol use: No    Drug use: No     SURGICAL HISTORY     Past Surgical History:   Procedure Laterality Date    BACK SURGERY      CARPAL TUNNEL RELEASE Bilateral     CATARACT REMOVAL WITH IMPLANT Left 01/08/2018    CHOLECYSTECTOMY      HERNIA REPAIR      INTRACAPSULAR CATARACT EXTRACTION Right 1/28/2019    PHACOEMULSIFICATION OF CATARACT RIGHT EYE WITH INTRAOCULAR LENS IMPLANT --BRANDON=4-- performed by Laura Boothe MD at [address] [facility] [address]      and polyps from \"throat\"     ALLERGIES   No Known Allergies  CURRENT MEDICATIONS     Current Outpatient Rx   Medication Sig Dispense Refill    hydrALAZINE (APRESOLINE) 25 MG tablet Take 1 tablet by mouth every 12 hours 90 tablet 3    amLODIPine (NORVASC) 10 MG tablet Take 1 tablet by mouth daily 30 tablet 3    magnesium oxide (MAG-OX) 400 (241.3 Mg) MG TABS tablet Take 1 tablet by mouth daily for 5 days 30 tablet 0    potassium bicarb-citric acid (EFFER-K) 20 MEQ TBEF effervescent tablet Take 1 tablet by mouth 2 times daily for 14 days 120 tablet 0    carbidopa-levodopa (SINEMET)  MG per tablet Take 1 tablet by mouth 2 times daily 90 tablet 3      potassium chloride  40 mEq Oral Once    potassium bicarb-citric acid  20 mEq Oral BID    hydrALAZINE  25 mg Oral 2 times per day    amLODIPine  10 mg Oral Daily    atenolol  25 mg Oral BID    carbidopa-levodopa  1 tablet Oral BID    citalopram  20 mg Oral Daily    primidone  50 mg Oral Nightly    atorvastatin  10 mg Oral Daily    sodium chloride flush  5-40 mL Intravenous 2 times per day    heparin (porcine)  5,000 Units Subcutaneous 3 times per day      sodium chloride       sodium chloride flush, sodium chloride, promethazine **OR** ondansetron, polyethylene glycol, acetaminophen **OR** acetaminophen, oxyCODONE, morphine  HOME MEDICATIONS  [unfilled]  IMMUNIZATIONS     Immunization History   Administered Date(s) Administered    Tdap (Boostrix, Adacel) 04/12/2015     REVIEW OF SYSTEMS   See HPI for further details and pertinent postiives. Negative for the following:  Constitutional: Negative for weight change. Negative for appetite change and fatigue. HENT: Negative for nosebleeds, sore throat, mouth sores, trouble swallowing and voice change. Respiratory: Negative for cough, choking and chest tightness. Cardiovascular: Negative for chest pain   Gastrointestinal: See HPI  Musculoskeletal: Negative for arthralgias. Skin: Negative for pallor. Neurological: Negative for weakness and light-headedness. Hematological: Negative for adenopathy. Does not bruise/bleed easily. Psychiatric/Behavioral: Negative for suicidal ideas. PHYSICAL EXAM   VITAL SIGNS: BP (!) 157/67   Pulse 63   Temp 98 °F (36.7 °C) (Oral)   Resp 16   Ht 5' 4\" (1.626 m)   Wt 145 lb (65.8 kg)   SpO2 96%   BMI 24.89 kg/m²   With regards to weight, she reports stable / unchanged. Review of available records reveals:   Wt Readings from Last 50 Encounters:   05/25/21 145 lb (65.8 kg)   05/19/21 145 lb (65.8 kg)   01/28/19 139 lb (63 kg)   12/29/17 138 lb (62.6 kg)   03/16/17 146 lb (66.2 kg)   11/22/16 145 lb (65.8 kg)   11/10/15 149 lb (67.6 kg)   05/05/15 151 lb (68.5 kg)   02/03/15 149 lb (67.6 kg)   12/02/14 144 lb (65.3 kg)   09/18/14 142 lb (64.4 kg)   08/07/14 146 lb (66.2 kg)   08/05/14 148 lb (67.1 kg)   05/01/14 146 lb (66.2 kg)   04/10/14 149 lb (67.6 kg)   03/13/14 149 lb (67.6 kg)   02/27/14 149 lb (67.6 kg)   05/17/11 140 lb (63.5 kg)     Constitutional: Pleasant elderly female with baseline essential tremors, well developed, Well nourished, No acute distress, Non-toxic appearance. HENT: Normocephalic, Atraumatic, Bilateral external ears normal, Oropharynx moist, No oral exudates, Nose normal.   Eyes: Conjunctiva normal, No discharge. Neck: Normal range of motion, No tenderness, Supple, No stridor. Cardiovascular: Normal heart rate, Normal rhythm, No murmurs, No rubs, No gallops. Thorax & Lungs: Normal breath sounds, No respiratory distress, No wheezing, No chest tenderness  Abdomen: normal bowel sounds, soft, non tender, non distended, no hernias   Rectal:  Deferred. Skin: Warm, Dry, No erythema, No rash. No bruising.     Lower Extremities: Intact distal pulses, No edema, No tenderness,   Neurologic: Alert & oriented x 3    RADIOLOGY/PROCEDURES       COURSE & MEDICAL DECISION MAKING   (See epic chart for additional details including stool tests, total bilirubin, viral loads, procedures, and pathology)  Lab Results   Component Value Date    WBC 6.8 05/30/2021    HGB 8.5 (L) 05/30/2021    HCT 24.8 (L) 05/30/2021     05/30/2021    ALT 27 05/25/2021    AST 39 (H) 05/25/2021     05/31/2021    K 3.8 05/31/2021     05/31/2021    CREATININE 2.1 (H) 05/31/2021    BUN 27 (H) 05/31/2021    CO2 26 05/31/2021    TSH 2.35 07/26/2017    INR 0.92 05/09/2014    LABA1C 5.2 07/26/2017    LABMICR 6.40 (H) 05/26/2021     No results found for: BILIDIR  No results found for: PTH, CAION, PHOS  Lab Results   Component Value Date    LABALBU 2.9 05/25/2021    LABPROT 1.1 05/26/2021    ALKPHOS 68 05/25/2021    ALT 27 05/25/2021    AST 39 05/25/2021    BILITOT <0.2 05/25/2021     No results found for: LIPASE  No results found for: AMYLASE  Lab Results   Component Value Date    INR 0.92 05/09/2014    PROTIME 10.3 05/09/2014     Lab Results   Component Value Date    IRON 71 05/28/2021    TIBC 193 (L) 05/28/2021    FERRITIN 270.6 (H) 05/28/2021     No results found for: SEDRATE   No results found for: CRP  Lab Results   Component Value Date    WLVXCIAO60 928 (H) 02/21/2018    QSDPYLPZ99 196 (L) 07/26/2017    FZGCUIMR57 266 12/02/2014     Lab Results   Component Value Date    FOLATE >20.00 07/26/2017    FOLATE 7.19 12/02/2014   . FINAL IMPRESSION/ASSESSMENT/PLAN       1. Acute on chronic microcytic anemia with positive occult stool test in seting of acute renal injury. Etiology of microcytic anemia multifactorial including but not limited to anemia of chronic disease and other GI blood loss. Differentials include but not limited to peptic ulcer disease, esophagitis, gastritis vs. neoplasm    Plan:   Hemoglobin and hematocrit stable at 8.5 g/dL. Monitor hemoglobin hematocrit and transfuse to maintain hemoglobin > 7 g/dL  Protonix 40mg IV BID  Keep NPO with IV fluids  Plan for EGD with possible bleeding control tomorrow 6/1/21  Given patient's advanced age and comorbidities, I am doubtful patient will be able to tolerate bowel prep for colonoscopy with anesthesia. Esophagogastroduodenoscopy (EGD) with alternatives, rationale, benefits and risks, not limited to bleeding, infection, perforation, need of surgery, prolong hospital stay and anesthesia side effects were explained to patient's son Marci Glynn at [phone] . He verbalized understanding and agrees to proceed with EGD.        1.  The patient indicates understanding of these issues and

## 2021-05-31 NOTE — PROGRESS NOTES
Hospitalist Progress Note      PCP: Shyanne Baeza MD    Date of Admission: 5/25/2021    Chief Complaint: Multiple falls at home c/o right sided [facility] Course: This is a 61-year-old female with a past medical history of hypertension, hyperlipidemia, coronary artery disease, peripheral neuropathy, tremors, history of CVA x2, Parkinson disease admitted with falls, acute kidney injury nephrology consulted and following, noted to have IgG lambda monoclonal gammopathy oncology consulted unlikely to be myeloma patient declined bone marrow biopsy. Anemia Hemoccult positive stool GI consulted recommended continue with Protonix IV twice daily n.p.o. plan for EGD on 6/1/2021. Subjective: Patient denies any chest pain or shortness with no nausea vomiting morning in the bed. Continues to have poor p.o. intake      Medications:  Reviewed    Infusion Medications    sodium chloride       Scheduled Medications    potassium chloride  40 mEq Oral Once    potassium bicarb-citric acid  20 mEq Oral BID    hydrALAZINE  25 mg Oral 2 times per day    amLODIPine  10 mg Oral Daily    atenolol  25 mg Oral BID    carbidopa-levodopa  1 tablet Oral BID    citalopram  20 mg Oral Daily    primidone  50 mg Oral Nightly    atorvastatin  10 mg Oral Daily    sodium chloride flush  5-40 mL Intravenous 2 times per day    heparin (porcine)  5,000 Units Subcutaneous 3 times per day     PRN Meds: sodium chloride flush, sodium chloride, promethazine **OR** ondansetron, polyethylene glycol, acetaminophen **OR** acetaminophen, oxyCODONE, morphine    No intake or output data in the 24 hours ending 05/31/21 0956    Physical Exam Performed:    BP (!) 157/67   Pulse 63   Temp 98 °F (36.7 °C) (Oral)   Resp 16   Ht 5' 4\" (1.626 m)   Wt 145 lb (65.8 kg)   SpO2 96%   BMI 24.89 kg/m²     General appearance: No apparent distress, appears stated age and cooperative. HEENT: Pupils equal, round, and reactive to light. Conjunctivae/corneas clear. Neck: Supple, with full range of motion. No jugular venous distention. Trachea midline. Respiratory:  Normal respiratory effort. Clear to auscultation, bilaterally without Rales/Wheezes/Rhonchi. Cardiovascular: Regular rate and rhythm with normal S1/S2 without murmurs, rubs or gallops. Abdomen: Soft, non-tender, non-distended with normal bowel sounds. Musculoskeletal: No clubbing, cyanosis or edema bilaterally. Full range of motion without deformity. Skin: Skin color, texture, turgor normal.  No rashes or lesions. Neurologic:  Neurovascularly intact without any focal sensory/motor deficits. Cranial nerves: II-XII intact, grossly non-focal.  Psychiatric: Alert and oriented, thought content appropriate, normal insight  Capillary Refill: Brisk,3 seconds, normal   Peripheral Pulses: +2 palpable, equal bilaterally       Labs:   Recent Labs     05/28/21  0958 05/30/21  0536   WBC 7.7 6.8   HGB 8.5* 8.5*   HCT 25.6* 24.8*   * 420     Recent Labs     05/30/21  0536 05/30/21  2040 05/31/21  0750    138 139   K 3.1* 3.2* 3.8    103 104   CO2 23 25 26   BUN 29* 28* 27*   CREATININE 2.3* 2.3* 2.1*   CALCIUM 8.5 8.6 8.8     No results for input(s): AST, ALT, BILIDIR, BILITOT, ALKPHOS in the last 72 hours. No results for input(s): INR in the last 72 hours. No results for input(s): [employer] in the last 72 hours. Urinalysis:      Lab Results   Component Value Date    NITRU Negative 05/25/2021    WBCUA 21-50 05/25/2021    BACTERIA Rare 05/25/2021    RBCUA 3-4 05/25/2021    BLOODU TRACE-INTACT 05/25/2021    SPECGRAV 1.010 05/25/2021    GLUCOSEU Negative 05/25/2021       Radiology:  US RENAL COMPLETE   Final Result   No hydronephrosis      2.7 cm region in the central right kidney mildly different echogenicity than   the remainder of the renal parenchyma. This most likely represents normal   renal parenchyma but a mass could not be excluded.   A CT of the kidneys would   be helpful for further evaluation. Otherwise, unremarkable appearing kidneys and bladder         XR CHEST PORTABLE   Final Result   Bibasilar atelectasis or, less likely, pneumonia. Assessment/Plan:    Active Hospital Problems    Diagnosis     Acute urinary tract infection [N39.0]     Parkinson's disease ([address] [G20]     Recurrent falls [R29.6]      1.  This is a 80-year-old female with a history of Parkinson disease, tremors admitted with recurrent falls physical Occupational Therapy consulted to continue with the Sinemet, primidone. 2.  Possible sepsis admitted on admission urine culture with mixed mechelle initially started on Rocephin  discontinued after 3 doses. 3.  Hypokalemia supplement with p.o. potassium. 4.  Hypomagnesemia started on magnesium supplement for 3 days on 5/29/21  5.  Acute renal failure 2.7 cm possible mass in the right kidney nephrology consulted and following.  Renal ultrasound no hydronephrosis 2.7 cm area of central right kidney likely normal parenchymal but mildly different echogenicity cannot exclude mass consider CT scan without contrast.  6.  Hypertension controlled continue with current medication on beta-blocker, hydralazine and Norvasc. 7.    Acute kidney injury, IgG lambda monoclonal gammopathy oncology consulted recommended a bone marrow biopsy patient declined. 8.  Anemia Hemoccult positive GI consulted plan for EGD on 6/1/2021. Continue with the Protonix IV twice daily. Hemoglobin is stable no evidence of active bleeding.     DVT Prophylaxis: Heparin subcu  Diet: DIET GENERAL;  Code Status: Full Code    PT/OT Eval Status: Consulted recommending us ECF bed available    Dispo -once okay with nephrology, GI may be tomorrow    Vikas Cadet MD

## 2021-05-31 NOTE — PROGRESS NOTES
Physical Therapy  Facility/Department: [facility] C5 - MED SURG/ORTHO  Daily Treatment Note  NAME: Chris Stephens  : 3/16/1932  MRN: [id number]    Date of Service: 2021    Discharge Recommendations:  Subacute/Skilled Nursing Facility   PT Equipment Recommendations  Equipment Needed: No    Assessment   Body structures, Functions, Activity limitations: Decreased functional mobility ; Decreased strength;Decreased endurance;Decreased balance;Decreased safe awareness  Assessment: Pt SBA for bed mobility and transfers and CGA to ambulate 50' with RW. Pt performed 5x STS with SBA w/o evidence of LOB, but cues for hand placement and safety needed. Performed BLE therapeutic exercises to increase strength. Would benefit from skilled therapy to increase endurance and increase safety with mobility. Treatment Diagnosis: decreased functional mobility  Prognosis: Good  Decision Making: Medium Complexity  PT Education: Goals; General Safety; Disease Specific Education;PT Role;Plan of Care; Functional Mobility Training;Transfer Training;Gait Training  Disease Specific Education: Pt educated on importance of OOB mobility, prevention of complications of bedrest, and general safety during hospitalization. Pt verbalized understanding  Barriers to Learning: [place]  REQUIRES PT FOLLOW UP: Yes  Activity Tolerance  Activity Tolerance: Patient Tolerated treatment well  Activity Tolerance: /75, HR 76, SpO2 05%     Patient Diagnosis(es): The primary encounter diagnosis was Acute cystitis without hematuria. Diagnoses of Community acquired pneumonia, unspecified laterality, Acute renal failure, unspecified acute renal failure type ([address] and Frequent falls were also pertinent to this visit. has a past medical history of Arthritis, Asthma, Cancer ([address] Hyperlipidemia, Hypertension, Tremors of nervous system, and Unspecified cerebral artery occlusion with cerebral infarction.    has a past surgical history that includes back surgery; hernia repair; Cholecystectomy; Nasal polyp surgery; Carpal tunnel release (Bilateral); Cataract removal with implant (Left, 01/08/2018); and Intracapsular cataract extraction (Right, 1/28/2019). Restrictions  Restrictions/Precautions  Restrictions/Precautions: Fall Risk, General Precautions  Subjective   General  Chart Reviewed: Yes  Response To Previous Treatment: Patient with no complaints from previous session. Family / Caregiver Present: No  Referring Practitioner: Melyssa Santana MD  Subjective  Subjective: Pt agreeable to therapy. Denies pain at this time. General Comment  Comments: RN cleared Pt for therapy.   Pain Screening  Patient Currently in Pain: Denies  Vital Signs  Patient Currently in Pain: Denies       Orientation  Orientation  Overall Orientation Status: Within Functional Limits    Objective   Bed mobility  Supine to Sit: Stand by assistance  Sit to Supine: Unable to assess (Pt in chair at end of session)  Scooting: Supervision    Transfers  Sit to Stand: Stand by assistance  Stand to sit: Stand by assistance  Comment: STS x1 from bed and sit <> stand 5x from chair with SBA, cued for hand placement, cued to bring trunk upright, no LOB    Ambulation  Ambulation?: Yes  More Ambulation?: No  Ambulation 1  Surface: level tile  Device: Rolling Walker  Assistance: Contact guard assistance  Quality of Gait: appeared steady with no LOB, min difficulty directing RW  Gait Deviations: Slow Re;Decreased step length;Decreased step height  Distance: 50 ft  Comments: cued for safety, cued to fully turn before sitting on toilet/chair        Exercises  Hip Flexion: BLE x10 in standing with UE support on RW  Hip Abduction: seated clam shells BLE x12  Knee Long Arc Quad: seated BLE x12  Ankle Pumps: seated BLE x10        AM-PAC Score  AM-PAC Inpatient Mobility without Stair Climbing Raw Score : 15 (05/31/21 1124)  AM-PAC Inpatient without Stair Climbing T-Scale Score : 43.03 (05/31/21 1124)  Mobility Inpatient CMS 0-100% Score: 47.43 (05/31/21 1124)  Mobility Inpatient without Stair CMS G-Code Modifier : CK (05/31/21 1124)       Goals  Short term goals  Time Frame for Short term goals: 6/3  Short term goal 1: Pt will complete bed mobility wtih SBA - goal met 5/31  Short term goal 2: Pt will SPT from bed to chair with CGA  -5/31 NT  Short term goal 3: Pt will ambulate x 25' with RW with SBA - PROGRESSING CGA x 50' w/ RW  Short term goal 4: Pt will participate in B LE Exercises to improve mobility by 5/28  -ongoing  Patient Goals   Patient goals : to get stronger    Plan    Plan  Times per week: 3-5x/week  Times per day: Daily  Current Treatment Recommendations: Strengthening, Balance Training, Endurance Training, Functional Mobility Training, Transfer Training, Gait Training, Equipment Evaluation, Education, & procurement, Patient/Caregiver Education & Training, Safety Education & Training, Home Exercise Program, Positioning, Neuromuscular Re-education  Safety Devices  Type of devices: All fall risk precautions in place, Call light within reach, Gait belt, Patient at risk for falls, Nurse notified, Left in chair, Chair alarm in place  Restraints  Initially in place: No     Therapy Time   Individual Concurrent Group Co-treatment   Time In 1008         Time Out 1033         Minutes 25         Timed Code Treatment Minutes: [address] [address], SPT    If pt is unable to be seen after this session, please let this note serve as discharge summary. Please see case management note for discharge disposition. Thank you.

## 2021-06-01 ENCOUNTER — ANESTHESIA EVENT (OUTPATIENT)
Dept: ENDOSCOPY | Age: 86
DRG: 814 | End: 2021-06-01
Payer: MEDICARE

## 2021-06-01 ENCOUNTER — ANESTHESIA (OUTPATIENT)
Dept: ENDOSCOPY | Age: 86
DRG: 814 | End: 2021-06-01
Payer: MEDICARE

## 2021-06-01 VITALS — OXYGEN SATURATION: 99 % | DIASTOLIC BLOOD PRESSURE: 63 MMHG | SYSTOLIC BLOOD PRESSURE: 172 MMHG

## 2021-06-01 LAB
ANION GAP SERPL CALCULATED.3IONS-SCNC: 11 MMOL/L (ref 3–16)
BUN BLDV-MCNC: 26 MG/DL (ref 7–20)
CALCIUM SERPL-MCNC: 8.9 MG/DL (ref 8.3–10.6)
CHLORIDE BLD-SCNC: 103 MMOL/L (ref 99–110)
CO2: 26 MMOL/L (ref 21–32)
CREAT SERPL-MCNC: 2.1 MG/DL (ref 0.6–1.2)
EKG ATRIAL RATE: 62 BPM
EKG DIAGNOSIS: NORMAL
EKG P AXIS: 53 DEGREES
EKG P-R INTERVAL: 190 MS
EKG Q-T INTERVAL: 470 MS
EKG QRS DURATION: 86 MS
EKG QTC CALCULATION (BAZETT): 477 MS
EKG R AXIS: 21 DEGREES
EKG T AXIS: 24 DEGREES
EKG VENTRICULAR RATE: 62 BPM
GFR AFRICAN AMERICAN: 27
GFR NON-AFRICAN AMERICAN: 22
GLUCOSE BLD-MCNC: 87 MG/DL (ref 70–99)
HCT VFR BLD CALC: 26.7 % (ref 36–48)
HEMOGLOBIN: 8.9 G/DL (ref 12–16)
MCH RBC QN AUTO: 25.6 PG (ref 26–34)
MCHC RBC AUTO-ENTMCNC: 33.1 G/DL (ref 31–36)
MCV RBC AUTO: 77.2 FL (ref 80–100)
PDW BLD-RTO: 15.1 % (ref 12.4–15.4)
PLATELET # BLD: 439 K/UL (ref 135–450)
PMV BLD AUTO: 6.5 FL (ref 5–10.5)
POTASSIUM REFLEX MAGNESIUM: 3.8 MMOL/L (ref 3.5–5.1)
RBC # BLD: 3.46 M/UL (ref 4–5.2)
SODIUM BLD-SCNC: 140 MMOL/L (ref 136–145)
WBC # BLD: 7.1 K/UL (ref 4–11)

## 2021-06-01 PROCEDURE — 88305 TISSUE EXAM BY PATHOLOGIST: CPT

## 2021-06-01 PROCEDURE — 93005 ELECTROCARDIOGRAM TRACING: CPT | Performed by: ANESTHESIOLOGY

## 2021-06-01 PROCEDURE — 2700000000 HC OXYGEN THERAPY PER DAY

## 2021-06-01 PROCEDURE — 2580000003 HC RX 258: Performed by: NURSE ANESTHETIST, CERTIFIED REGISTERED

## 2021-06-01 PROCEDURE — 2500000003 HC RX 250 WO HCPCS: Performed by: NURSE ANESTHETIST, CERTIFIED REGISTERED

## 2021-06-01 PROCEDURE — 36415 COLL VENOUS BLD VENIPUNCTURE: CPT

## 2021-06-01 PROCEDURE — C9113 INJ PANTOPRAZOLE SODIUM, VIA: HCPCS | Performed by: HOSPITALIST

## 2021-06-01 PROCEDURE — 2580000003 HC RX 258: Performed by: ANESTHESIOLOGY

## 2021-06-01 PROCEDURE — 3700000000 HC ANESTHESIA ATTENDED CARE: Performed by: INTERNAL MEDICINE

## 2021-06-01 PROCEDURE — 0DB68ZX EXCISION OF STOMACH, VIA NATURAL OR ARTIFICIAL OPENING ENDOSCOPIC, DIAGNOSTIC: ICD-10-PCS | Performed by: INTERNAL MEDICINE

## 2021-06-01 PROCEDURE — 85027 COMPLETE CBC AUTOMATED: CPT

## 2021-06-01 PROCEDURE — 93010 ELECTROCARDIOGRAM REPORT: CPT | Performed by: INTERNAL MEDICINE

## 2021-06-01 PROCEDURE — 43239 EGD BIOPSY SINGLE/MULTIPLE: CPT | Performed by: INTERNAL MEDICINE

## 2021-06-01 PROCEDURE — 80048 BASIC METABOLIC PNL TOTAL CA: CPT

## 2021-06-01 PROCEDURE — 6370000000 HC RX 637 (ALT 250 FOR IP): Performed by: INTERNAL MEDICINE

## 2021-06-01 PROCEDURE — 2709999900 HC NON-CHARGEABLE SUPPLY: Performed by: INTERNAL MEDICINE

## 2021-06-01 PROCEDURE — 6360000002 HC RX W HCPCS: Performed by: HOSPITALIST

## 2021-06-01 PROCEDURE — 94761 N-INVAS EAR/PLS OXIMETRY MLT: CPT

## 2021-06-01 PROCEDURE — 2580000003 HC RX 258: Performed by: HOSPITALIST

## 2021-06-01 PROCEDURE — 1200000000 HC SEMI PRIVATE

## 2021-06-01 PROCEDURE — 6370000000 HC RX 637 (ALT 250 FOR IP): Performed by: HOSPITALIST

## 2021-06-01 PROCEDURE — 3609012400 HC EGD TRANSORAL BIOPSY SINGLE/MULTIPLE: Performed by: INTERNAL MEDICINE

## 2021-06-01 PROCEDURE — 0DB98ZX EXCISION OF DUODENUM, VIA NATURAL OR ARTIFICIAL OPENING ENDOSCOPIC, DIAGNOSTIC: ICD-10-PCS | Performed by: INTERNAL MEDICINE

## 2021-06-01 PROCEDURE — 7100000011 HC PHASE II RECOVERY - ADDTL 15 MIN: Performed by: INTERNAL MEDICINE

## 2021-06-01 PROCEDURE — 6360000002 HC RX W HCPCS: Performed by: NURSE ANESTHETIST, CERTIFIED REGISTERED

## 2021-06-01 PROCEDURE — 7100000010 HC PHASE II RECOVERY - FIRST 15 MIN: Performed by: INTERNAL MEDICINE

## 2021-06-01 RX ORDER — PANTOPRAZOLE SODIUM 40 MG/1
40 TABLET, DELAYED RELEASE ORAL
Status: DISCONTINUED | OUTPATIENT
Start: 2021-06-02 | End: 2021-06-05 | Stop reason: HOSPADM

## 2021-06-01 RX ORDER — PANTOPRAZOLE SODIUM 40 MG/1
40 TABLET, DELAYED RELEASE ORAL
Status: DISCONTINUED | OUTPATIENT
Start: 2021-06-01 | End: 2021-06-01

## 2021-06-01 RX ORDER — LIDOCAINE HYDROCHLORIDE 20 MG/ML
INJECTION, SOLUTION EPIDURAL; INFILTRATION; INTRACAUDAL; PERINEURAL PRN
Status: DISCONTINUED | OUTPATIENT
Start: 2021-06-01 | End: 2021-06-01 | Stop reason: SDUPTHER

## 2021-06-01 RX ORDER — SODIUM CHLORIDE, SODIUM LACTATE, POTASSIUM CHLORIDE, CALCIUM CHLORIDE 600; 310; 30; 20 MG/100ML; MG/100ML; MG/100ML; MG/100ML
INJECTION, SOLUTION INTRAVENOUS CONTINUOUS
Status: DISCONTINUED | OUTPATIENT
Start: 2021-06-01 | End: 2021-06-01

## 2021-06-01 RX ORDER — HYDRALAZINE HYDROCHLORIDE 20 MG/ML
5 INJECTION INTRAMUSCULAR; INTRAVENOUS EVERY 10 MIN PRN
Status: DISCONTINUED | OUTPATIENT
Start: 2021-06-01 | End: 2021-06-01 | Stop reason: HOSPADM

## 2021-06-01 RX ORDER — ONDANSETRON 2 MG/ML
4 INJECTION INTRAMUSCULAR; INTRAVENOUS
Status: DISCONTINUED | OUTPATIENT
Start: 2021-06-01 | End: 2021-06-01 | Stop reason: HOSPADM

## 2021-06-01 RX ORDER — SODIUM CHLORIDE, SODIUM LACTATE, POTASSIUM CHLORIDE, CALCIUM CHLORIDE 600; 310; 30; 20 MG/100ML; MG/100ML; MG/100ML; MG/100ML
INJECTION, SOLUTION INTRAVENOUS CONTINUOUS PRN
Status: DISCONTINUED | OUTPATIENT
Start: 2021-06-01 | End: 2021-06-01 | Stop reason: SDUPTHER

## 2021-06-01 RX ORDER — PROPOFOL 10 MG/ML
INJECTION, EMULSION INTRAVENOUS PRN
Status: DISCONTINUED | OUTPATIENT
Start: 2021-06-01 | End: 2021-06-01 | Stop reason: SDUPTHER

## 2021-06-01 RX ORDER — OXYCODONE HYDROCHLORIDE AND ACETAMINOPHEN 5; 325 MG/1; MG/1
1 TABLET ORAL PRN
Status: DISCONTINUED | OUTPATIENT
Start: 2021-06-01 | End: 2021-06-01 | Stop reason: HOSPADM

## 2021-06-01 RX ORDER — FENTANYL CITRATE 50 UG/ML
25 INJECTION, SOLUTION INTRAMUSCULAR; INTRAVENOUS EVERY 5 MIN PRN
Status: DISCONTINUED | OUTPATIENT
Start: 2021-06-01 | End: 2021-06-01 | Stop reason: HOSPADM

## 2021-06-01 RX ORDER — MEPERIDINE HYDROCHLORIDE 50 MG/ML
12.5 INJECTION INTRAMUSCULAR; INTRAVENOUS; SUBCUTANEOUS EVERY 5 MIN PRN
Status: DISCONTINUED | OUTPATIENT
Start: 2021-06-01 | End: 2021-06-01 | Stop reason: HOSPADM

## 2021-06-01 RX ORDER — OXYCODONE HYDROCHLORIDE AND ACETAMINOPHEN 5; 325 MG/1; MG/1
2 TABLET ORAL PRN
Status: DISCONTINUED | OUTPATIENT
Start: 2021-06-01 | End: 2021-06-01 | Stop reason: HOSPADM

## 2021-06-01 RX ORDER — PROMETHAZINE HYDROCHLORIDE 25 MG/ML
6.25 INJECTION, SOLUTION INTRAMUSCULAR; INTRAVENOUS
Status: DISCONTINUED | OUTPATIENT
Start: 2021-06-01 | End: 2021-06-01 | Stop reason: HOSPADM

## 2021-06-01 RX ADMIN — ATORVASTATIN CALCIUM 10 MG: 10 TABLET, FILM COATED ORAL at 12:54

## 2021-06-01 RX ADMIN — ATENOLOL 25 MG: 25 TABLET ORAL at 08:40

## 2021-06-01 RX ADMIN — POTASSIUM BICARBONATE 20 MEQ: 782 TABLET, EFFERVESCENT ORAL at 21:25

## 2021-06-01 RX ADMIN — PROPOFOL 50 MG: 10 INJECTION, EMULSION INTRAVENOUS at 11:29

## 2021-06-01 RX ADMIN — HYDRALAZINE HYDROCHLORIDE 25 MG: 25 TABLET, FILM COATED ORAL at 21:25

## 2021-06-01 RX ADMIN — SODIUM CHLORIDE, SODIUM LACTATE, POTASSIUM CHLORIDE, AND CALCIUM CHLORIDE: .6; .31; .03; .02 INJECTION, SOLUTION INTRAVENOUS at 11:07

## 2021-06-01 RX ADMIN — SODIUM CHLORIDE, SODIUM LACTATE, POTASSIUM CHLORIDE, AND CALCIUM CHLORIDE: .6; .31; .03; .02 INJECTION, SOLUTION INTRAVENOUS at 11:01

## 2021-06-01 RX ADMIN — HEPARIN SODIUM 5000 UNITS: 5000 INJECTION INTRAVENOUS; SUBCUTANEOUS at 12:54

## 2021-06-01 RX ADMIN — CARBIDOPA AND LEVODOPA 1 TABLET: 10; 100 TABLET ORAL at 21:26

## 2021-06-01 RX ADMIN — HYDRALAZINE HYDROCHLORIDE 25 MG: 25 TABLET, FILM COATED ORAL at 12:53

## 2021-06-01 RX ADMIN — POTASSIUM BICARBONATE 20 MEQ: 782 TABLET, EFFERVESCENT ORAL at 12:53

## 2021-06-01 RX ADMIN — ATENOLOL 25 MG: 25 TABLET ORAL at 21:25

## 2021-06-01 RX ADMIN — PANTOPRAZOLE SODIUM 40 MG: 40 INJECTION, POWDER, FOR SOLUTION INTRAVENOUS at 04:39

## 2021-06-01 RX ADMIN — AMLODIPINE BESYLATE 10 MG: 5 TABLET ORAL at 12:54

## 2021-06-01 RX ADMIN — CARBIDOPA AND LEVODOPA 1 TABLET: 10; 100 TABLET ORAL at 08:40

## 2021-06-01 RX ADMIN — SODIUM CHLORIDE, PRESERVATIVE FREE 10 ML: 5 INJECTION INTRAVENOUS at 21:26

## 2021-06-01 RX ADMIN — SODIUM CHLORIDE, PRESERVATIVE FREE 10 ML: 5 INJECTION INTRAVENOUS at 08:43

## 2021-06-01 RX ADMIN — HEPARIN SODIUM 5000 UNITS: 5000 INJECTION INTRAVENOUS; SUBCUTANEOUS at 21:28

## 2021-06-01 RX ADMIN — PRIMIDONE 50 MG: 50 TABLET ORAL at 21:25

## 2021-06-01 RX ADMIN — PROPOFOL 50 MG: 10 INJECTION, EMULSION INTRAVENOUS at 11:32

## 2021-06-01 RX ADMIN — LIDOCAINE HYDROCHLORIDE 60 MG: 20 INJECTION, SOLUTION EPIDURAL; INFILTRATION; INTRACAUDAL; PERINEURAL at 11:29

## 2021-06-01 RX ADMIN — CITALOPRAM HYDROBROMIDE 20 MG: 20 TABLET ORAL at 12:53

## 2021-06-01 RX ADMIN — PANTOPRAZOLE SODIUM 40 MG: 40 INJECTION, POWDER, FOR SOLUTION INTRAVENOUS at 14:39

## 2021-06-01 ASSESSMENT — PAIN SCALES - GENERAL
PAINLEVEL_OUTOF10: 0

## 2021-06-01 NOTE — PROGRESS NOTES
[facility] NEPHROLOGY                                        Inpatient Progress note    Summary:   Chris Stephens is being seen by nephrology for JOAQUIN. Admitted with multiple falls. She has a h/o Parkinson's disease. Has h/o HTN, HLD. Interval History  S/p EGD today, had biopsies, there was a mass 15 mm. An endoscopic US was recommended for that. Cr 2.3 >2.1 >2.1  No LUTS, no dysuria  No edema, no chest pain. Son at bedside. UO not recorded. Plan:   - renal function is stable today   There are no acute electrolyte or volume issues. She has a lot of moving pieces right now. Possible monoclonal gammopathy but declining BM biopsy. S/p EGD and small mass found. Had a possible renal mass so could consider a CT to further evaluate but can not have contrast right now     She is 80 and has parkinson's with multiple falls so functional status compromised. Son at bedside said they do want bone marrow BX. This is certainly less invasive than kidney bx at this point. Will leave to oncology. At this point would continue conservative management of JOAQUIN and avoid nephrotoxins. Rigo Mcdaniel MD  [facility] Nephrology  Office: [phone]    Assessment:     #JOAQUIN  Etiology of JOAQUIN was thought to be related to hypotensive episodes and maybe NSAID use. Does have small M spike with normal k/l ratio declining BM biopsy . Baseline Cr 0.76 in 2021  Cr 4 on admit   Cr stable around 2 now. UA showed pyruia, 3-4 RBC, 2-5 renal epithelial cells   Urine na 61  P/cr 1.1 g    mg  Has M protein 0.5 mg/dl on SPEP. K/l ratio is normal.   Renal US showed no hydronephrosis     #Renal mass:   2.7 cm right kidney mass. CT was recommended. #IgG lambda monoclonal gammopathy  Heme/onc feels less likely to be myeloma  Apparently declining bone marrow biopsy. #HTN  BP elevated   On amlodipine, coreg and hydral        ROS:   Positives Listed Bold. All other remaining systems are negative. Constitutional:  fever, chills, weakness, weight change, fatigue,      Skin:  rash, pruritus, hair loss, bruising, dry skin, petechiae. Head, Face, Neck   headaches, swelling,  cervical adenopathy. Respiratory: shortness of breath, cough, or wheezing  Cardiovascular: chest pain, palpitations, dizzy, edema  Gastrointestinal: nausea, vomiting, diarrhea, constipation,belly pain    Yellow skin, blood in stool  Musculoskeletal:  back pain, muscle weakness, gait problems,       joint pain or swelling. Genitourinary:  dysuria, poor urine flow, flank pain, blood in urine  Neurologic:  vertigo, TIA'S, syncope, seizures, focal weakness  Psychosocial:  insomnia, anxiety, or depression. Additional positive findings: -     PMH:   Past medical history, surgical history, social history, family history are reviewed and updated as appropriate. Reviewed current medication list.   Allergies reviewed and updated as needed. PE:   Vitals:    06/01/21 1200   BP: (!) 160/50   Pulse: 58   Resp: 16   Temp:    SpO2: 100%       General appearance: frail femal in NAD, Comfortable. Awake and alert   HEENT: EOM intact, no icterus. Trachea is midline. Neck : No masses, appears symmetrical, no JVD, trachea is midline  Respiratory: Respiratory effort appears normal, bilateral equal chest rise  Cardiovascular: Ausculation shows RR, no edema  Abdomen: No visible mass or tenderness, non distended. Musculoskeletal:  Joints with no swelling or deformity. Skin:no rashes, ulcers, induration, no jaundice. Neuro: Appropriate responses.  CN 2-12 grossly intact      Lab Results   Component Value Date    CREATININE 2.1 (H) 06/01/2021    BUN 26 (H) 06/01/2021     06/01/2021    K 3.8 06/01/2021     06/01/2021    CO2 26 06/01/2021      Lab Results   Component Value Date    WBC 7.1 06/01/2021    HGB 8.9 (L) 06/01/2021    HCT 26.7 (L) 06/01/2021    MCV 77.2 (L) 06/01/2021     06/01/2021     Lab Results   Component Value Date

## 2021-06-01 NOTE — PROGRESS NOTES
equal, round and reactive to light, sclera clear and conjunctiva normal  ENT: Normocephalic, without obvious abnormality, atraumatic  NECK: supple, symmetrical, no jugular venous distension and no carotid bruits   HEMATOLOGIC/LYMPHATIC: no cervical, supraclavicular or axillary lymphadenopathy   LUNGS: no increased work of breathing and clear to auscultation   CARDIOVASCULAR: regular rate and rhythm, normal S1 and S2, no murmur noted  ABDOMEN: normal bowel sounds x 4, soft, non-distended, non-tender, no masses palpated, no hepatosplenomgaly   MUSCULOSKELETAL: full range of motion noted, tone is normal  NEUROLOGIC: awake, alert, oriented to name, place and time. Motor skills grossly intact. SKIN: Normal skin color, texture, turgor and no jaundice. appears intact   EXTREMITIES: no LE edema       Data      Recent Labs     05/30/21  0536 06/01/21  0906   WBC 6.8 7.1   HGB 8.5* 8.9*   HCT 24.8* 26.7*    439   MCV 76.0* 77.2*        Recent Labs     05/30/21  2040 05/31/21  0750 06/01/21  0906    139 140   K 3.2* 3.8 3.8    104 103   CO2 25 26 26   BUN 28* 27* 26*   CREATININE 2.3* 2.1* 2.1*     No results for input(s): AST, ALT, ALB, BILIDIR, BILITOT, ALKPHOS in the last 72 hours. Magnesium:    Lab Results   Component Value Date    MG 1.70 05/30/2021    MG 1.60 05/30/2021    MG 1.60 05/29/2021         Problem List  Patient Active Problem List   Diagnosis    Essential and other specified forms of tremor    Hereditary and idiopathic peripheral neuropathy    Abnormality of gait    Essential hypertension    Mixed hyperlipidemia    Dysthymia    Polyneuropathy    Essential hypertension    CAD in native artery    Recurrent falls    Acute urinary tract infection    Parkinson's disease ([address]       ASSESSMENT AND PLAN:    1.) IgG lambda monoclonal gammopathy  - An IgG-type paraprotein <1 g/dL with normal kappa/lambda ratio is low risk. Unlikely to be myeloma.   - The patient is anemic and in renal

## 2021-06-01 NOTE — ANESTHESIA PRE PROCEDURE
Department of Anesthesiology  Preprocedure Note       Name:  Sheela Irwin   Age:  80 y.o.  :  3/16/1932                                          MRN:  [id number]         Date:  2021      Surgeon:  Molly Schmidt MD    Procedure:  EGD DIAGNOSTIC ONLY    HPI:   80y.o. 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 Labs on presentation noted acute kidney injury with elevated BUN 69, creatinine 4, positive UA for UTI. Hemoglobin 10.1, hematocrit 31.2, platelets 570. WBC 11.1. Iron studies noted elevated ferritin 270, normal iron 71, iron saturation 37 low TIBC 193. Denies abdominal pain, nausea, vomiting, fever, chills, constipation, diarrhea, hematochezia or melenic stools. Medications prior to admission:   hydrALAZINE (APRESOLINE) 25 MG tablet Take 1 tablet by mouth every 12 hours   amLODIPine (NORVASC) 10 MG tablet Take 1 tablet by mouth daily   magnesium oxide (MAG-OX) 400 (241.3 Mg) MG TABS tablet Take 1 tablet by mouth daily for 5 days   potassium bicarb-citric acid (EFFER-K) 20 MEQ TBEF  Take 1 tablet by mouth 2 times daily for 14 days   carbidopa-levodopa (SINEMET)  MG per tablet Take 1 tablet by mouth 2 times daily   primidone (MYSOLINE) 50 MG tablet Take 2 tablets by mouth 2 times daily  Patient taking differently: Take 50 mg by mouth nightly    citalopram (CELEXA) 20 MG tablet Take 20 mg by mouth daily. aspirin (ASPIRIN CHILDRENS) 81 MG chewable tablet Take 1 tablet by mouth daily. atenolol (TENORMIN) 25 MG tablet Take 25 mg by mouth 2 times daily. simvastatin (ZOCOR) 20 MG tablet Take 20 mg by mouth nightly.        CURRENT MEDS:   pantoprazole (PROTONIX) injection 40 mg  40 mg Intravenous Q12H    potassium bicarb-citric acid (EFFER-K) effervescent tablet 20 mEq  20 mEq Oral BID    hydrALAZINE (APRESOLINE) tablet 25 mg  25 mg Oral 2 times per day    amLODIPine (NORVASC) tablet 10 mg  10 mg Oral Daily    atenolol (TENORMIN) tablet 25 mg  25 mg Oral BID    carbidopa-levodopa (SINEMET)  MG per tablet 1 tablet  1 tablet Oral BID    citalopram (CELEXA) tablet 20 mg  20 mg Oral Daily    primidone (MYSOLINE) tablet 50 mg  50 mg Oral Nightly    atorvastatin (LIPITOR) tablet 10 mg  10 mg Oral Daily    heparin (porcine) injection 5,000 Units  5,000 Units Subcutaneous 3 times per day    promethazine (PHENERGAN) tablet 12.5 mg  12.5 mg Oral Q6H PRN       ondansetron (ZOFRAN) injection 4 mg  4 mg Intravenous Q6H PRN    polyethylene glycol (GLYCOLAX) packet 17 g  17 g Oral Daily PRN    acetaminophen (TYLENOL) tablet 650 mg  650 mg Oral Q6H PRN       acetaminophen (TYLENOL) suppository 650 mg  650 mg Rectal Q6H PRN    oxyCODONE (ROXICODONE) immediate release tablet 5 mg  5 mg Oral Q4H PRN    morphine (PF) injection 2 mg  2 mg Intravenous Q6H PRN     Allergies:  No Known Allergies    Problem List:     Essential and other specified forms of tremor    Hereditary and idiopathic peripheral neuropathy    Abnormality of gait    Essential hypertension    Mixed hyperlipidemia    Dysthymia    Polyneuropathy    Essential hypertension    CAD in native artery    Recurrent falls    Acute urinary tract infection    Parkinson's disease ([facility] [address]     Past Medical History:     Arthritis     Asthma     past hx    Cancer ([facility] [address]     skin    Hyperlipidemia     Hypertension     Tremors of nervous system     Unspecified cerebral artery occlusion with cerebral infarction     X 2-      Past Surgical History:     BACK SURGERY      CARPAL TUNNEL RELEASE Bilateral     CATARACT REMOVAL WITH IMPLANT Left 01/08/2018    CHOLECYSTECTOMY      HERNIA REPAIR      INTRACAPSULAR CATARACT EXTRACTION Right 1/28/2019    PHACOEMULSIFICATION OF CATARACT RIGHT EYE WITH INTRAOCULAR LENS IMPLANT --BRANDON=4-- performed by Steve Qiu Sheela Page MD at [address]      and polyps from \"throat\"     Social History:    Tobacco Use    Smoking status: Never Smoker    Smokeless tobacco: Never Used   Substance Use Topics    Alcohol use: No     Vital Signs (Current):    05/31/21 2230 05/31/21 2258 06/01/21 0307 06/01/21 0841   BP: (!) 175/88 (!) 149/76 (!) 151/71 (!) 154/66   Pulse:  67 59 59   Resp:  16 16 14   Temp:  98.2 °F (36.8 °C) 97.8 °F (36.6 °C) 98.2 °F (36.8 °C)   TempSrc:  Oral Oral Oral   SpO2:  93% 97% 95%     Height: 5' 4\" (1.626 m)  (05/25/21) Weight: 145 lb (65.8 kg)  (05/25/21)   BMI: 24.9           BP Readings from Last 3 Encounters:   06/01/21 (!) 154/66   05/19/21 121/63   01/28/19 (!) 145/65       NPO Status: >8 hrs with +bowel prep                         BMI:   Wt Readings from Last 3 Encounters:   05/25/21 145 lb (65.8 kg)   05/19/21 145 lb (65.8 kg)   01/28/19 139 lb (63 kg)     Body mass index is 24.89 kg/m².     CBC:    WBC 7.1 06/01/2021    HGB 8.9 06/01/2021    HCT 26.7 06/01/2021    MCV 77.2 06/01/2021     06/01/2021     CMP:     06/01/2021    K 3.8 06/01/2021     06/01/2021    CO2 26 06/01/2021    BUN 26 06/01/2021    CREATININE 2.1 06/01/2021    GFRAA 27 06/01/2021    AGRATIO 0.7 05/25/2021    LABGLOM 22 06/01/2021    GLUCOSE 87 06/01/2021    PROT 7.2 05/25/2021    CALCIUM 8.9 06/01/2021    BILITOT <0.2 05/25/2021    ALKPHOS 68 05/25/2021    AST 39 05/25/2021    ALT 27 05/25/2021     Anesthesia Evaluation  Patient summary reviewed and Nursing notes reviewed  Airway: Mallampati: III  TM distance: >3 FB   Neck ROM: limited  Mouth opening: > = 3 FB Dental:          Pulmonary: breath sounds clear to auscultation  (+) asthma:                            Cardiovascular:    (+) hypertension:, hyperlipidemia    (-) murmur    ECG reviewed  Rhythm: regular  Rate: normal                    Neuro/Psych:   (+) CVA:, neuromuscular disease: Parkinson's disease, psychiatric history:depression/anxiety GI/Hepatic/Renal:   (+) renal disease: ARF,           Endo/Other:    (+) : arthritis: OA., .    (-) diabetes mellitus               Abdominal:           Vascular:                                      Anesthesia Plan      TIVA     ASA 4       Induction: intravenous. Anesthetic plan and risks discussed with patient. Plan discussed with CRNA.             Bharati Mcnulty MD

## 2021-06-01 NOTE — CARE COORDINATION
CM updated by Leila Wagner NP that pt having, \"Esophagogastroduodenoscopy with biopsy\" today. DCP plan to [employer] no precert needed. Rapid covid day of DC.  CM following-Ginette Coleman RN

## 2021-06-01 NOTE — PROGRESS NOTES
Hospitalist Progress Note      PCP: Sisi Ramos MD    Date of Admission: 5/25/2021    Chief Complaint: Multiple falls at home c/o right sided pain     Hospital Course:   81 yo female with PMH of HTN, HLD, CAD, peripheral neuropathy, tremors, CVA x2, Parkinson's disease admitted with falls and JOAQUIN. Subjective:   Pt is on RA. Afebrile. VSS. No complaints currently. Medications:  Reviewed    Infusion Medications    lactated ringers 100 mL/hr at 06/01/21 1101    sodium chloride       Scheduled Medications    pantoprazole  40 mg Intravenous Q12H    potassium bicarb-citric acid  20 mEq Oral BID    hydrALAZINE  25 mg Oral 2 times per day    amLODIPine  10 mg Oral Daily    atenolol  25 mg Oral BID    carbidopa-levodopa  1 tablet Oral BID    citalopram  20 mg Oral Daily    primidone  50 mg Oral Nightly    atorvastatin  10 mg Oral Daily    sodium chloride flush  5-40 mL Intravenous 2 times per day    heparin (porcine)  5,000 Units Subcutaneous 3 times per day     PRN Meds: sodium chloride flush, sodium chloride, promethazine **OR** ondansetron, polyethylene glycol, acetaminophen **OR** acetaminophen, oxyCODONE, morphine      Intake/Output Summary (Last 24 hours) at 6/1/2021 1424  Last data filed at 6/1/2021 1135  Gross per 24 hour   Intake 100 ml   Output    Net 100 ml       Physical Exam Performed:    BP (!) 170/71   Pulse 62   Temp 98.2 °F (36.8 °C) (Oral)   Resp 16   Ht 5' 4\" (1.626 m)   Wt 145 lb (65.8 kg)   SpO2 96%   BMI 24.89 kg/m²     General appearance: No apparent distress, appears stated age and cooperative. HEENT: Pupils equal, round, and reactive to light. Conjunctivae/corneas clear. Neck: Supple, with full range of motion. No jugular venous distention. Trachea midline. Respiratory:  Normal respiratory effort. Clear to auscultation, bilaterally without Rales/Wheezes/Rhonchi. Cardiovascular: Regular rate and rhythm with normal S1/S2 without murmurs, rubs or gallops. Abdomen: Soft, non-tender, non-distended with normal bowel sounds. Musculoskeletal: No clubbing, cyanosis or edema bilaterally. Full range of motion without deformity. Skin: Skin color, texture, turgor normal.  No rashes or lesions. Neurologic:  Neurovascularly intact without any focal sensory/motor deficits. Cranial nerves: II-XII intact, grossly non-focal.  Psychiatric: Alert and oriented  Capillary Refill: Brisk,3 seconds, normal   Peripheral Pulses: +2 palpable, equal bilaterally       Labs:   Recent Labs     05/30/21  0536 06/01/21  0906   WBC 6.8 7.1   HGB 8.5* 8.9*   HCT 24.8* 26.7*    439     Recent Labs     05/30/21  2040 05/31/21  0750 06/01/21  0906    139 140   K 3.2* 3.8 3.8    104 103   CO2 25 26 26   BUN 28* 27* 26*   CREATININE 2.3* 2.1* 2.1*   CALCIUM 8.6 8.8 8.9     Urinalysis:      Lab Results   Component Value Date    NITRU Negative 05/25/2021    WBCUA 21-50 05/25/2021    BACTERIA Rare 05/25/2021    RBCUA 3-4 05/25/2021    BLOODU TRACE-INTACT 05/25/2021    SPECGRAV 1.010 05/25/2021    GLUCOSEU Negative 05/25/2021       Radiology:  US RENAL COMPLETE   Final Result   No hydronephrosis      2.7 cm region in the central right kidney mildly different echogenicity than   the remainder of the renal parenchyma. This most likely represents normal   renal parenchyma but a mass could not be excluded. A CT of the kidneys would   be helpful for further evaluation. Otherwise, unremarkable appearing kidneys and bladder         XR CHEST PORTABLE   Final Result   Bibasilar atelectasis or, less likely, pneumonia.            Assessment/Plan:    Active Hospital Problems    Diagnosis     Anemia of chronic disease [D63.8]     Occult blood positive stool [R19.5]     Subepithelial lesion of stomach [K31.9]     Gastritis without bleeding [K29.70]     Hiatal hernia [K44.9]     Acute urinary tract infection [N39.0]     Parkinson's disease ([address] [G20]     Recurrent falls [R29.6] Frequent falls in pt with Parkinson's disease:  - Continue her home sinemet.   - PT/OT evaluations   - Continue supportive care. Acute kidney injury (improving):  - Etiology: possibly prerenal. Cr on admission was 4.0, previously normal, current Cr 2.1.   - Pt is status post IVFs. - Renal US showed no hydronephrosis but did demonstrate a 2.7 cm area of different echogenicity concerning for mass. - Nephrology consulted/following. Considering CT for further evaluation. IgG lambda monoclonal gammopathy:  - Oncology consulted, recommended a bone marrow biopsy, patient declined however son wants to proceed -- updated Oncology. Anemia:  - Hemoccult positive. GI consulted/following. 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 F/u pathology. - Continue daily PPI. - Hemoglobin is stable with no evidence of active bleeding. Monitor CBC closely. Possible sepsis (ruled out):  - Urine culture showed mixed mechelle. S/p 3 doses of IV rocephin. Hypokalemia/hypomagnesemia:  - Monitor and replete as needed. Hypertension:  - Sub-optimal control.   - Continue amlodipine, atenolol and hydralazine.   - Monitor closely. DVT Prophylaxis: SQ heparin   Diet: DIET GENERAL;  Dietary Nutrition Supplements: Standard High Calorie Oral Supplement  Code Status: Full Code    PT/OT Eval Status: recs for SNF     Dispo - 2-3 days pending workup / specialist input.     [address] [facility] - CNP

## 2021-06-01 NOTE — PROGRESS NOTES
Comprehensive Nutrition Assessment    Type and Reason for Visit:       Nutrition Recommendations/Plan:   1. General diet  2. Ensure vanilla with breakfast and dinner  3. Will monitor nutritional adequacy, nutrition-related labs, weights, BMs, and clinical progress     Nutrition Assessment:  Patient admitted with frequent fall and acute kidney injury. History of parkinson's. EGD with biopsy today. Currently on a general diet. Patient reported appetite at home is good but decreased while in hospital.  Diarrhea noted the past 3 days; patient is drinking fluids and agreeable to Ensure bid (drinks at home twice per day). Malnutrition Assessment:  Malnutrition Status: At risk for malnutrition (Comment)      Estimated Daily Nutrient Needs:  Energy (kcal):  1746-1174; Weight Used for Energy Requirements:  Ideal     Protein (g):  71-82; Weight Used for Protein Requirements:  Ideal (1.3-1.5)        Fluid (ml/day):   ; Method Used for Fluid Requirements:  1 ml/kcal      Nutrition Related Findings:  last BM on 6/1/21, diarrhea noted the past 3 days by patient      Wounds:   (scattered bruising)       Current Nutrition Therapies:    DIET GENERAL;  Dietary Nutrition Supplements: Standard High Calorie Oral Supplement    Anthropometric Measures:  · Height: 5' 4\" (162.6 cm)  · Current Body Weight: 145 lb (65.8 kg)   · Ideal Body Weight: 120 lbs; % Ideal Body Weight     · BMI: 24.9  · BMI Categories: Normal Weight (BMI 22.0 to 24.9) age over 72       Nutrition Diagnosis:   · Inadequate energy intake related to  (decreased appetite) as evidenced by intake 26-50%, intake 0-25%    Nutrition Interventions:   Food and/or Nutrient Delivery:  Continue Current Diet, Start Oral Nutrition Supplement  Nutrition Education/Counseling:  Education initiated (encouraging more calories and protein)   Coordination of Nutrition Care:  Continue to monitor while inpatient    Goals:  Patient will eat 50% or greater of meals and supplements. Nutrition Monitoring and Evaluation:   Behavioral-Environmental Outcomes:      Food/Nutrient Intake Outcomes:  Food and Nutrient Intake, Supplement Intake  Physical Signs/Symptoms Outcomes:  Nutrition Focused Physical Findings, Fluid Status or Edema     Discharge Planning:     Too soon to determine     Electronically signed by Nell Wood, [address] [place] [address], TOREY on 6/1/21 at 2:32 PM EDT    Contact: Office: [phone]; [address] [place] [address] Road: [facility]

## 2021-06-01 NOTE — OP NOTE
[facility]    [address] [place] [address] Mitra Dias[address] [place] [address]  Phone: [phone]  [facility] [address]  Phone: [phone]   BTE:[phone]    EGD Procedure Note    Patient: Oswaldo Lopez  : 3/16/1932    Procedure: Esophagogastroduodenoscopy with biopsy    Date:  2021     Endoscopist:  Leas Giles MD, MD    Referring Physician:  Sasha Hooper MD    Preoperative Diagnosis:  Recurrent falls [R29.6]    Anesthesia: Anesthesia: MAC  Sedation: Propofol per anesthesia  Start Time: 1130  Stop Time: 113  ASA Class: 3  Mallampati: III (soft palate, base of uvula visible)    Indications: This is a 80y.o. 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 GI consulted for anemia of chronic disease with occult positive stools. Procedure Details  Informed consent was obtained for the procedure, including conscious sedation. Risks of pancreatitis, infection, perforation, hemorrhage, adverse drug reaction and aspiration were discussed. The patient was placed in the left lateral decubitus position. Based on the pre-procedure assessment, including review of the patient's medical history, medications, allergies, and review of systems, she had been deemed to be an appropriate candidate for conscious sedation; she was therefore sedated with the medications listed above. She was monitored continuously with ECG tracing, pulse oximetry, blood pressure monitoring, and direct observation. A gastroscope was inserted into the mouth and advanced under direct vision to second portion of the duodenum.   A careful inspection was made as the gastroscope was withdrawn, including a retroflexed view of the proximal stomach including views of the incisura and cardia; findings and interventions are described below. Appropriate photodocumentation Was Obtained. If photos taken, they were ordered to be scanned into the medical record. Findings:   Normal upper and mid esophagus  Regular Z-line at 35 cm from incisors  A 1 cm sliding hiatal hernia. Mild erythematous mucosa in antrum and body of stomach suggestive of mild gastritis. Biopsies taken from antrum, incisura and body of stomach to evaluate for H. Pylori. A 15 mm subepithelial lesion seen in the posterior wall of antrum; negative for pillow sign, suggestive of gastrointestinal tumor vs. Leiomyoma. Normal duodenal bulb and second portion of duodenum. Biopsies taken to evaluate for Celiac disease. Specimens: Was Obtained    Complications:   None; patient tolerated the procedure well. Disposition:   PACU - hemodynamically stable. Estimated Blood loss:  none    Impression:   Normal upper and mid esophagus  Regular Z-line at 35 cm from incisors  A 1 cm sliding hiatal hernia. Mild gastritis. Biopsied. A 15 mm subepithelial lesion seen in the posterior wall of antrum, suggestive of gastrointestinal tumor (GIST) vs. Leiomyoma. Normal duodenal bulb and second portion of duodenum. Biopsied    Recommendations:  -Continue Protonix 40 mg orally daily   -Await pathology. ,   -To consider elective endoscopic ultrasound to further evaluate gastric subepithelial lesion if patient and family agreeable. -Clinical watch with PRBC transfusion as needed.          Darwin Iverson MD 6/1/21 11:37 AM EDT

## 2021-06-01 NOTE — PROGRESS NOTES
Occupational Therapy  OT follow up attempted. Pt is currently off the floor for a procedure. OT will continue to follow up as pt is willing and available and schedule allows.      Aden Vasquez, [phone]

## 2021-06-01 NOTE — ANESTHESIA POSTPROCEDURE EVALUATION
Department of Anesthesiology  Postprocedure Note    Patient: Silvia Corral  MRN: [id number]  YOB: 1932  Date of evaluation: 6/1/2021    Procedure Summary     Date: 06/01/21 Room / Location: [facility] / [facility]    Anesthesia Start: 7560 Anesthesia Stop: 5891    Procedure: EGD BIOPSY (N/A ) Diagnosis: (Blood in stool, Anemia)    Surgeons: Jana Can MD Responsible Provider: Cash Armando MD    Anesthesia Type: TIVA ASA Status: 4        Anesthesia Type: TIVA    Wesley Phase I: Wesley Score: 10    Wesley Phase II: Wesley Score: 8    Last vitals: Reviewed and per EMR flowsheets.      Anesthesia Post Evaluation   Anesthetic Problems: no   Cardiovascular System Stable: yes  Respiratory Function: Airway Patent yes  ETT no  Ventilator no  Level of consciousness: awake, alert and oriented  Post-op pain: adequate analgesia  Hydration Adequate: yes  Nausea/Vomiting:no  Other Issues:     Crystal Turcios MD

## 2021-06-01 NOTE — PLAN OF CARE
Nutrition Problem #1: Inadequate energy intake  Intervention: Food and/or Nutrient Delivery: Continue Current Diet, Start Oral Nutrition Supplement  Nutritional Goals: Patient will eat 50% or greater of meals and supplements.

## 2021-06-02 ENCOUNTER — TELEPHONE (OUTPATIENT)
Dept: GASTROENTEROLOGY | Age: 86
End: 2021-06-02

## 2021-06-02 LAB
ANION GAP SERPL CALCULATED.3IONS-SCNC: 9 MMOL/L (ref 3–16)
BUN BLDV-MCNC: 26 MG/DL (ref 7–20)
CALCIUM SERPL-MCNC: 8.9 MG/DL (ref 8.3–10.6)
CHLORIDE BLD-SCNC: 101 MMOL/L (ref 99–110)
CO2: 27 MMOL/L (ref 21–32)
CREAT SERPL-MCNC: 2.3 MG/DL (ref 0.6–1.2)
GFR AFRICAN AMERICAN: 24
GFR NON-AFRICAN AMERICAN: 20
GLUCOSE BLD-MCNC: 85 MG/DL (ref 70–99)
HCT VFR BLD CALC: 26 % (ref 36–48)
HEMOGLOBIN: 8.6 G/DL (ref 12–16)
INR BLD: 1.13 (ref 0.86–1.14)
MCH RBC QN AUTO: 25.8 PG (ref 26–34)
MCHC RBC AUTO-ENTMCNC: 33.3 G/DL (ref 31–36)
MCV RBC AUTO: 77.7 FL (ref 80–100)
PDW BLD-RTO: 14.6 % (ref 12.4–15.4)
PLATELET # BLD: 418 K/UL (ref 135–450)
PMV BLD AUTO: 6.7 FL (ref 5–10.5)
POTASSIUM REFLEX MAGNESIUM: 4.3 MMOL/L (ref 3.5–5.1)
PROTHROMBIN TIME: 13.1 SEC (ref 10–13.2)
RBC # BLD: 3.34 M/UL (ref 4–5.2)
SODIUM BLD-SCNC: 137 MMOL/L (ref 136–145)
WBC # BLD: 8.6 K/UL (ref 4–11)

## 2021-06-02 PROCEDURE — 6370000000 HC RX 637 (ALT 250 FOR IP): Performed by: REGISTERED NURSE

## 2021-06-02 PROCEDURE — 85610 PROTHROMBIN TIME: CPT

## 2021-06-02 PROCEDURE — 97535 SELF CARE MNGMENT TRAINING: CPT

## 2021-06-02 PROCEDURE — 97116 GAIT TRAINING THERAPY: CPT

## 2021-06-02 PROCEDURE — 6370000000 HC RX 637 (ALT 250 FOR IP): Performed by: HOSPITALIST

## 2021-06-02 PROCEDURE — 36415 COLL VENOUS BLD VENIPUNCTURE: CPT

## 2021-06-02 PROCEDURE — 88184 FLOWCYTOMETRY/ TC 1 MARKER: CPT

## 2021-06-02 PROCEDURE — 1200000000 HC SEMI PRIVATE

## 2021-06-02 PROCEDURE — 97110 THERAPEUTIC EXERCISES: CPT

## 2021-06-02 PROCEDURE — 6360000002 HC RX W HCPCS: Performed by: HOSPITALIST

## 2021-06-02 PROCEDURE — 85027 COMPLETE CBC AUTOMATED: CPT

## 2021-06-02 PROCEDURE — 2580000003 HC RX 258: Performed by: HOSPITALIST

## 2021-06-02 PROCEDURE — 97530 THERAPEUTIC ACTIVITIES: CPT

## 2021-06-02 PROCEDURE — 6370000000 HC RX 637 (ALT 250 FOR IP): Performed by: INTERNAL MEDICINE

## 2021-06-02 PROCEDURE — 88185 FLOWCYTOMETRY/TC ADD-ON: CPT

## 2021-06-02 PROCEDURE — 80048 BASIC METABOLIC PNL TOTAL CA: CPT

## 2021-06-02 RX ADMIN — PANTOPRAZOLE SODIUM 40 MG: 40 TABLET, DELAYED RELEASE ORAL at 06:50

## 2021-06-02 RX ADMIN — CARBIDOPA AND LEVODOPA 1 TABLET: 10; 100 TABLET ORAL at 20:43

## 2021-06-02 RX ADMIN — SODIUM CHLORIDE, PRESERVATIVE FREE 10 ML: 5 INJECTION INTRAVENOUS at 20:46

## 2021-06-02 RX ADMIN — PRIMIDONE 50 MG: 50 TABLET ORAL at 20:43

## 2021-06-02 RX ADMIN — ATORVASTATIN CALCIUM 10 MG: 10 TABLET, FILM COATED ORAL at 09:12

## 2021-06-02 RX ADMIN — HEPARIN SODIUM 5000 UNITS: 5000 INJECTION INTRAVENOUS; SUBCUTANEOUS at 06:50

## 2021-06-02 RX ADMIN — HEPARIN SODIUM 5000 UNITS: 5000 INJECTION INTRAVENOUS; SUBCUTANEOUS at 15:13

## 2021-06-02 RX ADMIN — POTASSIUM BICARBONATE 20 MEQ: 782 TABLET, EFFERVESCENT ORAL at 20:43

## 2021-06-02 RX ADMIN — AMLODIPINE BESYLATE 10 MG: 5 TABLET ORAL at 09:13

## 2021-06-02 RX ADMIN — HYDRALAZINE HYDROCHLORIDE 25 MG: 25 TABLET, FILM COATED ORAL at 09:12

## 2021-06-02 RX ADMIN — ONDANSETRON 4 MG: 2 INJECTION INTRAMUSCULAR; INTRAVENOUS at 11:43

## 2021-06-02 RX ADMIN — CITALOPRAM HYDROBROMIDE 20 MG: 20 TABLET ORAL at 09:12

## 2021-06-02 RX ADMIN — POTASSIUM BICARBONATE 20 MEQ: 782 TABLET, EFFERVESCENT ORAL at 09:13

## 2021-06-02 RX ADMIN — ATENOLOL 25 MG: 25 TABLET ORAL at 09:12

## 2021-06-02 RX ADMIN — SODIUM CHLORIDE, PRESERVATIVE FREE 10 ML: 5 INJECTION INTRAVENOUS at 09:13

## 2021-06-02 RX ADMIN — HYDRALAZINE HYDROCHLORIDE 25 MG: 25 TABLET, FILM COATED ORAL at 20:43

## 2021-06-02 RX ADMIN — ATENOLOL 25 MG: 25 TABLET ORAL at 20:43

## 2021-06-02 RX ADMIN — CARBIDOPA AND LEVODOPA 1 TABLET: 10; 100 TABLET ORAL at 09:13

## 2021-06-02 RX ADMIN — HEPARIN SODIUM 5000 UNITS: 5000 INJECTION INTRAVENOUS; SUBCUTANEOUS at 20:43

## 2021-06-02 ASSESSMENT — PAIN SCALES - GENERAL: PAINLEVEL_OUTOF10: 0

## 2021-06-02 NOTE — TELEPHONE ENCOUNTER
Patient is still inpatient at [facility]. Fabricio flores do EUS at [facility]. I could get her in at 15 at [facility] on Monday. Do you think she will be discharged by then or could they do a transfer?   Please advise

## 2021-06-02 NOTE — PROGRESS NOTES
06/02/21 1000 -Palliative care consult completed and Dillon Pierce RN added to treatment team.    -Wilma Vásquez, PCA

## 2021-06-02 NOTE — PROGRESS NOTES
[facility] NEPHROLOGY                                        Inpatient Progress note    Summary:   Ham Baron is being seen by nephrology for JOAQUIN. Admitted with multiple falls. She has a h/o Parkinson's disease. Has h/o HTN, HLD. Interval History  Seen and examined at bedside. She has no complaints  Denied any dizziness when she sat up in the chair earlier today. Has no edema, no chest pain. Cr 2.3 >2.1 >2.1 > 2.3  No LUTS, no dysuria   UO not recorded. Plan:   - renal function is stable today   There are no acute electrolyte or volume issues. At this point would continue conservative management of JOAQUIN and avoid nephrotoxins. Valeria Echavarria MD  [address] Nephrology  Office: [phone]    Assessment:     #JOAQUIN  Etiology of JOAQUIN was thought to be related to hypotensive episodes and maybe NSAID use. Does have small M spike with normal k/l ratio declining BM biopsy . Baseline Cr 0.76 in April 2021  Cr 4 on admit   Cr stable around 2 now. UA showed pyruia, 3-4 RBC, 2-5 renal epithelial cells   Urine na 61  P/cr 1.1 g    mg  Has M protein 0.5 mg/dl on SPEP. K/l ratio is normal.   Renal US showed no hydronephrosis     #Renal mass:   2.7 cm right kidney mass. CT was recommended. #IgG lambda monoclonal gammopathy  Heme/onc feels less likely to be myeloma  Apparently declining bone marrow biopsy. #HTN  BP elevated   On amlodipine, coreg and hydral        ROS:   Positives Listed Bold. All other remaining systems are negative. Constitutional:  fever, chills, weakness, weight change, fatigue,      Skin:  rash, pruritus, hair loss, bruising, dry skin, petechiae. Head, Face, Neck   headaches, swelling,  cervical adenopathy.      Respiratory: shortness of breath, cough, or wheezing  Cardiovascular: chest pain, palpitations, dizzy, edema  Gastrointestinal: nausea, vomiting, diarrhea, constipation,belly pain    Yellow skin, blood in stool  Musculoskeletal:  back pain, muscle weakness, gait problems,       joint pain or swelling. Genitourinary:  dysuria, poor urine flow, flank pain, blood in urine  Neurologic:  vertigo, TIA'S, syncope, seizures, focal weakness  Psychosocial:  insomnia, anxiety, or depression. Additional positive findings: -     PMH:   Past medical history, surgical history, social history, family history are reviewed and updated as appropriate. Reviewed current medication list.   Allergies reviewed and updated as needed. PE:   Vitals:    06/02/21 1430   BP: 113/65   Pulse: 57   Resp: 18   Temp: 98 °F (36.7 °C)   SpO2: 93%       General appearance: frail femal in NAD, Comfortable. Awake and alert   HEENT: EOM intact, no icterus. Trachea is midline. Neck : No masses, appears symmetrical, no JVD, trachea is midline  Respiratory: Respiratory effort appears normal, bilateral equal chest rise  Cardiovascular: Ausculation shows RR, no edema  Abdomen: No visible mass or tenderness, non distended. Musculoskeletal:  Joints with no swelling or deformity. Skin:no rashes, ulcers, induration, no jaundice. Neuro: Appropriate responses.  CN 2-12 grossly intact      Lab Results   Component Value Date    CREATININE 2.3 (H) 06/02/2021    BUN 26 (H) 06/02/2021     06/02/2021    K 4.3 06/02/2021     06/02/2021    CO2 27 06/02/2021      Lab Results   Component Value Date    WBC 8.6 06/02/2021    HGB 8.6 (L) 06/02/2021    HCT 26.0 (L) 06/02/2021    MCV 77.7 (L) 06/02/2021     06/02/2021     Lab Results   Component Value Date    CALCIUM 8.9 06/02/2021

## 2021-06-02 NOTE — PROGRESS NOTES
We will plan to do bone marrow biopsy tomorrow 6/3/21. Please make pt NPO at midnight and hold blood thinners in the AM, primary RN updated.

## 2021-06-02 NOTE — CARE COORDINATION
RN called SW to inform that pt and family may be changing their minds on a discharge plan. SW spoke with pt who notes that this is the case, she she would like to return home with her son. SW placed call to son, Jayant Bernstein and left a VM in hopes of discussing. Pt is not quite ready to leave the hospital yet so we have time to discuss a plan. Pt indicates that she has some DME at home and would be open to [facility] with no preference on agency. SW will follow to coordinate with son.

## 2021-06-02 NOTE — PROGRESS NOTES
Occupational Therapy  Facility/Department: [facility] C5 - MED SURG/ORTHO  Daily Treatment Note  NAME: Barbara Pedraza  : 3/16/1932  MRN: [id number]    Date of Service: 2021    Discharge Recommendations:  Subacute/Skilled Nursing Facility       Assessment   Assessment: Pt demo's good safety awarness this date. Pt is SBA for functional mobility and transfers. Pt was SBA for toileting and toilet transfer. Prognosis: Good  REQUIRES OT FOLLOW UP: Yes  Activity Tolerance  Activity Tolerance: Patient Tolerated treatment well  Activity Tolerance: 136/64 BP, HR 65, O2 96% on RA  Safety Devices  Safety Devices in place: Yes  Type of devices: Left in chair;Chair alarm in place;Call light within reach;Nurse notified;Gait belt         Patient Diagnosis(es): The primary encounter diagnosis was Acute cystitis without hematuria. Diagnoses of Community acquired pneumonia, unspecified laterality, Acute renal failure, unspecified acute renal failure type ([address] and Frequent falls were also pertinent to this visit. has a past medical history of Arthritis, Asthma, Cancer ([address] Hyperlipidemia, Hypertension, Tremors of nervous system, and Unspecified cerebral artery occlusion with cerebral infarction. has a past surgical history that includes back surgery; hernia repair; Cholecystectomy; Nasal polyp surgery; Carpal tunnel release (Bilateral); Cataract removal with implant (Left, 2018); Intracapsular cataract extraction (Right, 2019); and Upper gastrointestinal endoscopy (N/A, 2021).     Restrictions  Restrictions/Precautions  Restrictions/Precautions: Fall Risk, General Precautions  Position Activity Restriction  Other position/activity restrictions: tele     Subjective    General  Chart Reviewed: Yes  Patient assessed for rehabilitation services?: Yes  Response to previous treatment: Patient with no complaints from previous session  Family / Caregiver Present: Yes (two family members)  Referring Practitioner: Bernardino Perez MD  Diagnosis: falls  Subjective  Subjective: Pt in chair agreeable  General Comment  Comments: RN clears for therapy  Vital Signs  Patient Currently in Pain: Denies   Orientation     Objective    ADL  Grooming: Stand by assistance (to wash hands at sink)  Toileting: Stand by assistance (justo care)        Balance  Sitting Balance: Supervision  Standing Balance: Stand by assistance  Standing Balance  Time: ~2 minutes to wash and dry hands  Comment: Standing at sink SBA    Functional Mobility  Functional - Mobility Device: Rolling Walker  Activity: To/from bathroom  Assist Level: Stand by assistance    Toilet Transfers  Toilet - Technique: Ambulating (RW)  Toilet Transfer: Stand by assistance     Transfers  Sit to stand: Stand by assistance  Stand to sit: Stand by assistance        Cognition  Overall Cognitive Status: WFL  Cognition Comment: Hard of hearing        Plan   Plan  Times per week: 3-5x/wk      AM-PAC Score  AM-PAC Inpatient Daily Activity Raw Score: 22 (06/02/21 1417)  AM-PAC Inpatient ADL T-Scale Score : 47.1 (06/02/21 1417)  ADL Inpatient CMS 0-100% Score: 25.8 (06/02/21 1417)  ADL Inpatient CMS G-Code Modifier : Malachi Herzog (06/02/21 1417)    Goals  Short term goals  Time Frame for Short term goals: 1 week by 6/2  Short term goal 1: Pt will complete 2-3 grooming tasks standing at sink with SPV-- ongoing 6/2/21  Short term goal 2: Pt will complete functional transfers with CGA-- SBA 6/2/21  Short term goal 3: Pt will complete LB dressing with min A or less-- ongoing 6/2/21  Short term goal 4: Pt will complete toileting with mod A or less-- GOAL MET, pt demos min A 5/27/21  Short term goal 5: Pt will tolerate B UE ex x 20 reps w/o fatigue-- ongoing 5/27/21  Patient Goals   Patient goals : \"go home\"       Therapy Time   Individual Concurrent Group Co-treatment   Time In 0130         Time Out 0200         Minutes [address] [place], [place]

## 2021-06-02 NOTE — PROGRESS NOTES
CONSTITUTIONAL: awake, alert, cooperative, no apparent distress   EYES: pupils equal, round and reactive to light, sclera clear and conjunctiva normal  ENT: Normocephalic, without obvious abnormality, atraumatic  NECK: supple, symmetrical, no jugular venous distension and no carotid bruits   HEMATOLOGIC/LYMPHATIC: no cervical, supraclavicular or axillary lymphadenopathy   LUNGS: no increased work of breathing and clear to auscultation   CARDIOVASCULAR: regular rate and rhythm, normal S1 and S2, no murmur noted  ABDOMEN: normal bowel sounds x 4, soft, non-distended, non-tender, no masses palpated, no hepatosplenomgaly   MUSCULOSKELETAL: full range of motion noted, tone is normal  NEUROLOGIC: awake, alert, oriented to name, place and time. Motor skills grossly intact. SKIN: Normal skin color, texture, turgor and no jaundice. appears intact   EXTREMITIES: no LE edema       Data      Recent Labs     06/01/21  0906 06/02/21  0554   WBC 7.1 8.6   HGB 8.9* 8.6*   HCT 26.7* 26.0*    418   MCV 77.2* 77.7*        Recent Labs     05/31/21  0750 06/01/21  0906 06/02/21  0554    140 137   K 3.8 3.8 4.3    103 101   CO2 26 26 27   BUN 27* 26* 26*   CREATININE 2.1* 2.1* 2.3*     No results for input(s): AST, ALT, ALB, BILIDIR, BILITOT, ALKPHOS in the last 72 hours.     Magnesium:    Lab Results   Component Value Date    MG 1.70 05/30/2021    MG 1.60 05/30/2021    MG 1.60 05/29/2021         Problem List  Patient Active Problem List   Diagnosis    Essential and other specified forms of tremor    Hereditary and idiopathic peripheral neuropathy    Abnormality of gait    Essential hypertension    Mixed hyperlipidemia    Dysthymia    Polyneuropathy    Essential hypertension    CAD in native artery    Recurrent falls    Acute urinary tract infection    Parkinson's disease ([facility]    Anemia of chronic disease    Occult blood positive stool    Subepithelial lesion of stomach    Gastritis without bleeding  Hiatal hernia       ASSESSMENT AND PLAN:    1.) IgG lambda monoclonal gammopathy  - An IgG-type paraprotein <1 g/dL with normal kappa/lambda ratio is low risk. Unlikely to be myeloma. - The patient is anemic and in renal failure. The fact that her labs were normal 1 month ago and her M spike is so low suggests an acute event other than myeloma. Still myeloma is possible. - Could get a bone marrow if the patient/family is interested. Doubt there will be a large population of plasma cells that will define active myeloma by itself. - Patient and son now want a bone marrow bx done to help clarify the current ongoing medical issues for decision making purposes. Bone marrow bx ordered with myeloma panels requested.      2.) Anemia  - In the setting of FOBT pos.   - s/p EGD with mild gastritis and bx taken   - may need EUS to eval for possible gastric sub-epithelial lesion for possible GIST vs leiomyoma   - check EPO levels in AM   - iron studies are mixed with Ferritin is 270; iron stores are 71; sat 37 percent     Recommendations from GI:  -Continue Protonix 40 mg orally daily   -Await pathology. ,   -To consider elective endoscopic ultrasound to further evaluate gastric subepithelial lesion if patient and family agreeable. -Clinical watch with PRBC transfusion as needed. 3. Renal Mass 2.7 cm R ; mass not excluded   - needs CT imaging with contrast when creat improved, creat now 2.3. Avoid nephrotoxins per renal. Work up of mass pending goals of care and results of bone marrow bx.      Thank you for asking me to see the patient.              ONCOLOGIC DISPOSITION: await path from upper EGD, bone marrow bx ordered.  Consult palliative care to help with goals of care given the complexity of this case with new GI mass, possible myeloma with underlying Parkinson's disease     Dunia Diehl CNP   Please contact through [facility]

## 2021-06-02 NOTE — TELEPHONE ENCOUNTER
----- Message from Duy Arnett MD sent at 6/2/2021  8:35 AM EDT -----  Schedule upper EUS with FNA of gastric lesion.

## 2021-06-02 NOTE — CONSULTS
Nutrition Note    RD received consult for poor appetite/PO intakes x5 or more days. Dietitians following with nutrition assessment and recommendations in RD progress notes. Pt ordered Ensure BID on 6/1, pt's standard ONS at home. Will continue to monitor per nutrition standards of care.      Eda Paget, MS, RD, LD on 6/2/2021 at 12:41 PM  Office: [phone]

## 2021-06-02 NOTE — PROGRESS NOTES
sounds. Musculoskeletal: No clubbing, cyanosis or edema bilaterally. Full range of motion without deformity. Skin: Skin color, texture, turgor normal.  No rashes or lesions. Neurologic:  Neurovascularly intact without any focal sensory/motor deficits. Cranial nerves: II-XII intact, grossly non-focal.  Psychiatric: Alert and oriented  Capillary Refill: Brisk,3 seconds, normal   Peripheral Pulses: +2 palpable, equal bilaterally       Labs:   Recent Labs     06/01/21  0906 06/02/21  0554   WBC 7.1 8.6   HGB 8.9* 8.6*   HCT 26.7* 26.0*    418     Recent Labs     05/31/21  0750 06/01/21  0906 06/02/21  0554    140 137   K 3.8 3.8 4.3    103 101   CO2 26 26 27   BUN 27* 26* 26*   CREATININE 2.1* 2.1* 2.3*   CALCIUM 8.8 8.9 8.9     Urinalysis:      Lab Results   Component Value Date    NITRU Negative 05/25/2021    WBCUA 21-50 05/25/2021    BACTERIA Rare 05/25/2021    RBCUA 3-4 05/25/2021    BLOODU TRACE-INTACT 05/25/2021    SPECGRAV 1.010 05/25/2021    GLUCOSEU Negative 05/25/2021       Radiology:  US RENAL COMPLETE   Final Result   No hydronephrosis      2.7 cm region in the central right kidney mildly different echogenicity than   the remainder of the renal parenchyma. This most likely represents normal   renal parenchyma but a mass could not be excluded. A CT of the kidneys would   be helpful for further evaluation. Otherwise, unremarkable appearing kidneys and bladder         XR CHEST PORTABLE   Final Result   Bibasilar atelectasis or, less likely, pneumonia.          CT BIOPSY BONE MARROW    (Results Pending)     Assessment/Plan:    Active Hospital Problems    Diagnosis     Anemia of chronic disease [D63.8]     Occult blood positive stool [R19.5]     Subepithelial lesion of stomach [K31.9]     Gastritis without bleeding [K29.70]     Hiatal hernia [K44.9]     Acute urinary tract infection [N39.0]     Parkinson's disease ([address] [G20]     Recurrent falls [R29.6]        Frequent falls in pt with Parkinson's disease:  - Continue her home sinemet.   - PT/OT evaluations   - Continue supportive care. Acute kidney injury (improved):  - Etiology: possibly prerenal. Cr on admission was 4.0, previously normal, current Cr 2.3.   - Pt is status post IVFs. - Renal US showed no hydronephrosis but did demonstrate a 2.7 cm area of different echogenicity concerning for mass. - Nephrology consulted/following. Considering CT for further evaluation. IgG lambda monoclonal gammopathy:  - Oncology consulted, plan to proceed with bone marrow biopsy. Anemia:  - Hemoccult positive. GI consulted/following. 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 F/u pathology. - Continue daily PPI. - Hemoglobin is stable with no evidence of active bleeding. Monitor CBC closely. Possible sepsis (ruled out):  - Urine culture showed mixed mechelle. S/p 3 doses of IV rocephin. Hypokalemia/hypomagnesemia:  - Monitor and replete as needed. Hypertension:  - Sub-optimal control.   - Continue amlodipine, atenolol and hydralazine.   - Monitor closely. DVT Prophylaxis: SQ heparin   Diet: DIET GENERAL;  Dietary Nutrition Supplements: Standard High Calorie Oral Supplement  Diet NPO  Code Status: Full Code    PT/OT Eval Status: recs for SNF     Dispo - ~2 days pending workup / specialist input.     JAROD Shields - CNP

## 2021-06-02 NOTE — PROGRESS NOTES
Physical Therapy  Facility/Department: [facility] C5 - MED SURG/ORTHO  Daily Treatment Note  NAME: Gerry Montez  : 3/16/1932  MRN: [id number]    Date of Service: 2021    Discharge Recommendations:  Subacute/Skilled Nursing Facility   PT Equipment Recommendations  Equipment Needed: No  Other: defer    Assessment   Body structures, Functions, Activity limitations: Decreased functional mobility ; Decreased strength;Decreased endurance;Decreased balance;Decreased safe awareness  Assessment: Pt SBA for bed mobility and transfers and min A to ambulate [de-identified]' with RW. Pt demos need for frequent safety cues including  cues for hand placement and safety with turns. Performed BLE therapeutic exercises to increase strength no reports of fatigue. Would benefit from skilled therapy to increase endurance. Treatment Diagnosis: decreased functional mobility  Prognosis: Good  Decision Making: Medium Complexity  Barriers to Learning: [place]  REQUIRES PT FOLLOW UP: Yes  Activity Tolerance  Activity Tolerance: Patient Tolerated treatment well  Activity Tolerance: /68  HR 65  O2 95% RA     Patient Diagnosis(es): The primary encounter diagnosis was Acute cystitis without hematuria. Diagnoses of Community acquired pneumonia, unspecified laterality, Acute renal failure, unspecified acute renal failure type ([address] and Frequent falls were also pertinent to this visit. has a past medical history of Arthritis, Asthma, Cancer ([address] Hyperlipidemia, Hypertension, Tremors of nervous system, and Unspecified cerebral artery occlusion with cerebral infarction. has a past surgical history that includes back surgery; hernia repair; Cholecystectomy; Nasal polyp surgery; Carpal tunnel release (Bilateral); Cataract removal with implant (Left, 2018); Intracapsular cataract extraction (Right, 2019); and Upper gastrointestinal endoscopy (N/A, 2021).     Restrictions  Restrictions/Precautions  Restrictions/Precautions: Fall Risk, General Precautions  Position Activity Restriction  Other position/activity restrictions: tele  Subjective   General  Chart Reviewed: Yes  Response To Previous Treatment: Patient with no complaints from previous session. Family / Caregiver Present: Yes (grand dtr)  Referring Practitioner: Jackelin Arzola MD  Subjective  Subjective: Pt agreeable to therapy. General Comment  Comments: RN cleared Pt for therapy.   Pain Screening  Patient Currently in Pain: No  Vital Signs  Patient Currently in Pain: No       Orientation  Orientation  Overall Orientation Status: Within Functional Limits  Cognition      Objective   Bed mobility  Supine to Sit: Stand by assistance  Transfers  Sit to Stand: Contact guard assistance  Stand to sit: Contact guard assistance  Ambulation  Ambulation?: Yes  More Ambulation?: No  Ambulation 1  Surface: level tile  Device: Rolling Walker  Assistance: Minimal assistance  Quality of Gait: mildly unsteady with one LOB, min A to correct, assist directing RW  Gait Deviations: Slow Re;Decreased step length;Decreased step height  Distance: 80' x 2  Comments: cued for safety, cued to fully turn before sitting on toilet/chair     Balance  Posture: Poor  Sitting - Static: Good;-  Sitting - Dynamic: Fair;+  Standing - Static: Fair  Standing - Dynamic: Fair;- (rw)  Comments: to left LOB upon turning with rw, min A to correct  Exercises  Gluteal Sets: 12  Hip Flexion: BLE x10 in standing with UE support on RW  Hip Abduction: seated clam shells BLE x12  Knee Long Arc Quad: seated BLE x12  Ankle Pumps: seated BLE x15             AM-PAC Score  AM-PAC Inpatient Mobility Raw Score : 15 (06/02/21 1327)  AM-PAC Inpatient T-Scale Score : 39.45 (06/02/21 1327)  Mobility Inpatient CMS 0-100% Score: 57.7 (06/02/21 1327)  Mobility Inpatient CMS G-Code Modifier : CK (06/02/21 1327)          Goals  Short term goals  Time Frame for Short term goals: 6/3  Short term goal 1: Pt will complete bed mobility wtih SBA - goal met 6/02  Short term goal 2: Pt will SPT from bed to chair with CGA  -6/02 NT  Short term goal 3: Pt will ambulate x 25' with RW with SBA - 6/02 rw min A 80'  Short term goal 4: Pt will participate in B LE Exercises to improve mobility by 5/28  -ongoing - 6/02  on-going  Patient Goals   Patient goals : to get stronger    Plan    Plan  Times per week: 3-5x/week  Times per day: Daily  Current Treatment Recommendations: Strengthening, Balance Training, Endurance Training, Functional Mobility Training, Transfer Training, Gait Training, Equipment Evaluation, Education, & procurement, Patient/Caregiver Education & Training, Safety Education & Training, Home Exercise Program, Positioning, Neuromuscular Re-education  Safety Devices  Type of devices:  All fall risk precautions in place, Call light within reach, Gait belt, Patient at risk for falls, Nurse notified, Left in chair, Chair alarm in place  Restraints  Initially in place: No     Therapy Time   Individual Concurrent Group Co-treatment   Time In 1145         Time Out 1210         Minutes 25         Timed Code Treatment Minutes: [facility]

## 2021-06-03 ENCOUNTER — APPOINTMENT (OUTPATIENT)
Dept: INTERVENTIONAL RADIOLOGY/VASCULAR | Age: 86
DRG: 814 | End: 2021-06-03
Payer: MEDICARE

## 2021-06-03 LAB
ANION GAP SERPL CALCULATED.3IONS-SCNC: 10 MMOL/L (ref 3–16)
BASOPHILS ABSOLUTE: 0 K/UL (ref 0–0.2)
BASOPHILS RELATIVE PERCENT: 0.3 %
BUN BLDV-MCNC: 31 MG/DL (ref 7–20)
CALCIUM SERPL-MCNC: 8.8 MG/DL (ref 8.3–10.6)
CHLORIDE BLD-SCNC: 103 MMOL/L (ref 99–110)
CO2: 27 MMOL/L (ref 21–32)
CREAT SERPL-MCNC: 2.4 MG/DL (ref 0.6–1.2)
EOSINOPHILS ABSOLUTE: 0.2 K/UL (ref 0–0.6)
EOSINOPHILS RELATIVE PERCENT: 3.1 %
GFR AFRICAN AMERICAN: 23
GFR NON-AFRICAN AMERICAN: 19
GLUCOSE BLD-MCNC: 94 MG/DL (ref 70–99)
HCT VFR BLD CALC: 26 % (ref 36–48)
HEMOGLOBIN: 8.5 G/DL (ref 12–16)
LYMPHOCYTES ABSOLUTE: 0.9 K/UL (ref 1–5.1)
LYMPHOCYTES RELATIVE PERCENT: 12.8 %
MCH RBC QN AUTO: 25.8 PG (ref 26–34)
MCHC RBC AUTO-ENTMCNC: 32.9 G/DL (ref 31–36)
MCV RBC AUTO: 78.4 FL (ref 80–100)
MONOCYTES ABSOLUTE: 0.6 K/UL (ref 0–1.3)
MONOCYTES RELATIVE PERCENT: 8 %
NEUTROPHILS ABSOLUTE: 5.3 K/UL (ref 1.7–7.7)
NEUTROPHILS RELATIVE PERCENT: 75.8 %
PDW BLD-RTO: 15.1 % (ref 12.4–15.4)
PLATELET # BLD: 367 K/UL (ref 135–450)
PMV BLD AUTO: 6.6 FL (ref 5–10.5)
POTASSIUM REFLEX MAGNESIUM: 4.2 MMOL/L (ref 3.5–5.1)
RBC # BLD: 3.31 M/UL (ref 4–5.2)
SODIUM BLD-SCNC: 140 MMOL/L (ref 136–145)
WBC # BLD: 7 K/UL (ref 4–11)

## 2021-06-03 PROCEDURE — 85025 COMPLETE CBC W/AUTO DIFF WBC: CPT

## 2021-06-03 PROCEDURE — 80048 BASIC METABOLIC PNL TOTAL CA: CPT

## 2021-06-03 PROCEDURE — 6370000000 HC RX 637 (ALT 250 FOR IP): Performed by: INTERNAL MEDICINE

## 2021-06-03 PROCEDURE — 38222 DX BONE MARROW BX & ASPIR: CPT

## 2021-06-03 PROCEDURE — 88305 TISSUE EXAM BY PATHOLOGIST: CPT

## 2021-06-03 PROCEDURE — 82668 ASSAY OF ERYTHROPOIETIN: CPT

## 2021-06-03 PROCEDURE — 6370000000 HC RX 637 (ALT 250 FOR IP): Performed by: REGISTERED NURSE

## 2021-06-03 PROCEDURE — 2709999900 IR FLUOROSCOPY LESS THAN 1 HOUR

## 2021-06-03 PROCEDURE — 94761 N-INVAS EAR/PLS OXIMETRY MLT: CPT

## 2021-06-03 PROCEDURE — 2580000003 HC RX 258: Performed by: HOSPITALIST

## 2021-06-03 PROCEDURE — 88311 DECALCIFY TISSUE: CPT

## 2021-06-03 PROCEDURE — 6360000002 HC RX W HCPCS: Performed by: RADIOLOGY

## 2021-06-03 PROCEDURE — 1200000000 HC SEMI PRIVATE

## 2021-06-03 PROCEDURE — 88342 IMHCHEM/IMCYTCHM 1ST ANTB: CPT

## 2021-06-03 PROCEDURE — 6370000000 HC RX 637 (ALT 250 FOR IP): Performed by: HOSPITALIST

## 2021-06-03 PROCEDURE — 77002 NEEDLE LOCALIZATION BY XRAY: CPT

## 2021-06-03 PROCEDURE — 97530 THERAPEUTIC ACTIVITIES: CPT

## 2021-06-03 PROCEDURE — 6360000002 HC RX W HCPCS: Performed by: HOSPITALIST

## 2021-06-03 PROCEDURE — 97116 GAIT TRAINING THERAPY: CPT

## 2021-06-03 PROCEDURE — 88313 SPECIAL STAINS GROUP 2: CPT

## 2021-06-03 PROCEDURE — 97110 THERAPEUTIC EXERCISES: CPT

## 2021-06-03 PROCEDURE — 2700000000 HC OXYGEN THERAPY PER DAY

## 2021-06-03 RX ORDER — MIDAZOLAM HYDROCHLORIDE 1 MG/ML
INJECTION INTRAMUSCULAR; INTRAVENOUS
Status: COMPLETED | OUTPATIENT
Start: 2021-06-03 | End: 2021-06-03

## 2021-06-03 RX ORDER — FENTANYL CITRATE 50 UG/ML
INJECTION, SOLUTION INTRAMUSCULAR; INTRAVENOUS
Status: COMPLETED | OUTPATIENT
Start: 2021-06-03 | End: 2021-06-03

## 2021-06-03 RX ADMIN — PANTOPRAZOLE SODIUM 40 MG: 40 TABLET, DELAYED RELEASE ORAL at 05:59

## 2021-06-03 RX ADMIN — PRIMIDONE 50 MG: 50 TABLET ORAL at 20:40

## 2021-06-03 RX ADMIN — CARBIDOPA AND LEVODOPA 1 TABLET: 10; 100 TABLET ORAL at 20:40

## 2021-06-03 RX ADMIN — SODIUM CHLORIDE, PRESERVATIVE FREE 10 ML: 5 INJECTION INTRAVENOUS at 08:49

## 2021-06-03 RX ADMIN — FENTANYL CITRATE 25 MCG: 50 INJECTION INTRAMUSCULAR; INTRAVENOUS at 12:48

## 2021-06-03 RX ADMIN — POTASSIUM BICARBONATE 20 MEQ: 782 TABLET, EFFERVESCENT ORAL at 15:36

## 2021-06-03 RX ADMIN — HYDRALAZINE HYDROCHLORIDE 25 MG: 25 TABLET, FILM COATED ORAL at 08:49

## 2021-06-03 RX ADMIN — MIDAZOLAM 0.5 MG: 1 INJECTION INTRAMUSCULAR; INTRAVENOUS at 12:47

## 2021-06-03 RX ADMIN — ATENOLOL 25 MG: 25 TABLET ORAL at 20:40

## 2021-06-03 RX ADMIN — POTASSIUM BICARBONATE 20 MEQ: 782 TABLET, EFFERVESCENT ORAL at 20:40

## 2021-06-03 RX ADMIN — ATORVASTATIN CALCIUM 10 MG: 10 TABLET, FILM COATED ORAL at 08:49

## 2021-06-03 RX ADMIN — SODIUM CHLORIDE, PRESERVATIVE FREE 10 ML: 5 INJECTION INTRAVENOUS at 20:40

## 2021-06-03 RX ADMIN — CITALOPRAM HYDROBROMIDE 20 MG: 20 TABLET ORAL at 08:49

## 2021-06-03 RX ADMIN — FENTANYL CITRATE 25 MCG: 50 INJECTION INTRAMUSCULAR; INTRAVENOUS at 12:46

## 2021-06-03 RX ADMIN — AMLODIPINE BESYLATE 10 MG: 5 TABLET ORAL at 08:49

## 2021-06-03 RX ADMIN — ATENOLOL 25 MG: 25 TABLET ORAL at 08:49

## 2021-06-03 RX ADMIN — HEPARIN SODIUM 5000 UNITS: 5000 INJECTION INTRAVENOUS; SUBCUTANEOUS at 20:44

## 2021-06-03 RX ADMIN — HEPARIN SODIUM 5000 UNITS: 5000 INJECTION INTRAVENOUS; SUBCUTANEOUS at 15:36

## 2021-06-03 RX ADMIN — CARBIDOPA AND LEVODOPA 1 TABLET: 10; 100 TABLET ORAL at 08:49

## 2021-06-03 ASSESSMENT — PAIN SCALES - GENERAL: PAINLEVEL_OUTOF10: 0

## 2021-06-03 NOTE — PROGRESS NOTES
[facility] NEPHROLOGY                                        Inpatient Progress note    Summary:   Efrain Cardona is being seen by nephrology for JOAQUIN. Admitted with multiple falls. She has a h/o Parkinson's disease. Has h/o HTN, HLD. Interval History  BP low  Creatinine is still 2.4 slightly up since yesterday  Cr was 4 on admission    Plan:   Dc hydralazine  Due to low BP      Kavin Goldman MD  [facility] Nephrology  Office: [phone]    Assessment:     #JOAQUIN  Etiology of JOAQUIN was thought to be related to hypotensive episodes and maybe NSAID use. Does have small M spike with normal k/l ratio- she  declined BM biopsy . Baseline Cr 0.76 in April 2021  Cr 4 on admit   Cr stable around 2 now. UA showed pyruia, 3-4 RBC, 2-5 renal epithelial cells   Urine na 61  P/cr 1.1 g    mg  Has M protein 0.5 mg/dl on SPEP. K/l ratio is normal.   Renal US showed no hydronephrosis     #Renal mass:   2.7 cm right kidney mass. CT was recommended. #IgG lambda monoclonal gammopathy  Heme/onc feels less likely to be myeloma  Apparently declining bone marrow biopsy. #HTN  BP elevated   On amlodipine, coreg and hydral        ROS:   Positives Listed Bold. All other remaining systems are negative. Constitutional:  fever, chills, weakness, weight change, fatigue,      Skin:  rash, pruritus, hair loss, bruising, dry skin, petechiae. Head, Face, Neck   headaches, swelling,  cervical adenopathy. Respiratory: shortness of breath, cough, or wheezing  Cardiovascular: chest pain, palpitations, dizzy, edema  Gastrointestinal: nausea, vomiting, diarrhea, constipation,belly pain    Yellow skin, blood in stool  Musculoskeletal:  back pain, muscle weakness, gait problems,       joint pain or swelling. Genitourinary:  dysuria, poor urine flow, flank pain, blood in urine  Neurologic:  vertigo, TIA'S, syncope, seizures, focal weakness  Psychosocial:  insomnia, anxiety, or depression.   Additional positive findings: - PMH:   Past medical history, surgical history, social history, family history are reviewed and updated as appropriate. Reviewed current medication list.   Allergies reviewed and updated as needed. PE:   Vitals:    06/03/21 0846   BP: 118/66   Pulse: 64   Resp: 16   Temp: 97.8 °F (36.6 °C)   SpO2: 94%       General appearance: frail femal in NAD, Comfortable. Awake and alert   HEENT: EOM intact, no icterus. Trachea is midline. Neck : No masses, appears symmetrical, no JVD, trachea is midline  Respiratory: Respiratory effort appears normal, bilateral equal chest rise  Cardiovascular: Ausculation shows RR, no edema  Abdomen: No visible mass or tenderness, non distended. Musculoskeletal:  Joints with no swelling or deformity. Skin:no rashes, ulcers, induration, no jaundice. Neuro: Appropriate responses.  CN 2-12 grossly intact      Lab Results   Component Value Date    CREATININE 2.4 (H) 06/03/2021    BUN 31 (H) 06/03/2021     06/03/2021    K 4.2 06/03/2021     06/03/2021    CO2 27 06/03/2021      Lab Results   Component Value Date    WBC 7.0 06/03/2021    HGB 8.5 (L) 06/03/2021    HCT 26.0 (L) 06/03/2021    MCV 78.4 (L) 06/03/2021     06/03/2021     Lab Results   Component Value Date    CALCIUM 8.8 06/03/2021

## 2021-06-03 NOTE — PROGRESS NOTES
Physical Therapy  Facility/Department: [facility] C5 - MED SURG/ORTHO  Daily Treatment Note  NAME: Dorys Torre  : 3/16/1932  MRN: [id number]    Date of Service: 6/3/2021    Discharge Recommendations:  Subacute/Skilled Nursing Facility   PT Equipment Recommendations  Equipment Needed: No  Other: defer    Assessment   Body structures, Functions, Activity limitations: Decreased functional mobility ; Decreased strength;Decreased endurance;Decreased balance;Decreased safe awareness  Assessment: Pt SBA for bed mobility and transfers and cga A to ambulate [de-identified]' with RW. Pt demos need for frequent safety cues including  cues for hand placement and safety with turns. Performed BLE therapeutic exercises to increase strength no reports of fatigue. Would benefit from skilled therapy to increase endurance. Treatment Diagnosis: decreased functional mobility  Prognosis: Good  Decision Making: Medium Complexity  Patient Education: Ed: safety with mobility, especially STS and turns. Pt expressed understanding  Barriers to Learning: [place]  REQUIRES PT FOLLOW UP: Yes  Activity Tolerance  Activity Tolerance: /69  HR 59  O2 95% RA     Patient Diagnosis(es): The primary encounter diagnosis was Acute cystitis without hematuria. Diagnoses of Community acquired pneumonia, unspecified laterality, Acute renal failure, unspecified acute renal failure type ([address] and Frequent falls were also pertinent to this visit. has a past medical history of Arthritis, Asthma, Cancer ([address] Hyperlipidemia, Hypertension, Tremors of nervous system, and Unspecified cerebral artery occlusion with cerebral infarction. has a past surgical history that includes back surgery; hernia repair; Cholecystectomy; Nasal polyp surgery; Carpal tunnel release (Bilateral); Cataract removal with implant (Left, 2018); Intracapsular cataract extraction (Right, 2019); and Upper gastrointestinal endoscopy (N/A, 2021).     Restrictions Restrictions/Precautions  Restrictions/Precautions: Fall Risk, General Precautions  Position Activity Restriction  Other position/activity restrictions: tele  Subjective   General  Chart Reviewed: Yes  Response To Previous Treatment: Patient with no complaints from previous session. Family / Caregiver Present: No  Referring Practitioner: Nick Womack MD  Subjective  Subjective: Pt agreeable to therapy. \"I'm feeling nausea\"  General Comment  Comments: RN cleared Pt for therapy.   Pain Screening  Patient Currently in Pain: Denies  Vital Signs  Patient Currently in Pain: Denies       Orientation  Orientation  Overall Orientation Status: Within Functional Limits  Cognition      Objective   Bed mobility  Supine to Sit: Stand by assistance  Transfers  Sit to Stand: Contact guard assistance  Stand to sit: Contact guard assistance  Ambulation  Ambulation?: Yes  Ambulation 1  Surface: level tile  Device: Rolling Walker  Assistance: Contact guard assistance  Quality of Gait: mildly unsteady without LOB, no assist directing RW this session required  Gait Deviations: Slow Re;Decreased step length;Decreased step height  Distance: 80' x 2  Comments: cued for safety, cued to fully turn before sitting            AM-PAC Score  AM-PAC Inpatient Mobility Raw Score : 15 (06/02/21 1327)  AM-PAC Inpatient T-Scale Score : 39.45 (06/02/21 1327)  Mobility Inpatient CMS 0-100% Score: 57.7 (06/02/21 1327)  Mobility Inpatient CMS G-Code Modifier : CK (06/02/21 1327)          Goals  Short term goals  Time Frame for Short term goals: 6/3  Short term goal 1: Pt will complete bed mobility wtih SBA - goal met 6/03; new goal: w/ superv  Short term goal 2: Pt will SPT from bed to chair with CGA  -6/03 met  Short term goal 3: Pt will ambulate x 25' with RW with SBA - 6/03 rw cga A 80'  Short term goal 4: Pt will participate in B LE Exercises to improve mobility by 5/28  -ongoing - 6/03  on-going  Patient Goals   Patient goals : to get stronger Plan    Plan  Times per week: 3-5x/week  Times per day: Daily  Current Treatment Recommendations: Strengthening, Balance Training, Endurance Training, Functional Mobility Training, Transfer Training, Gait Training, Equipment Evaluation, Education, & procurement, Patient/Caregiver Education & Training, Safety Education & Training, Home Exercise Program, Positioning, Neuromuscular Re-education  Safety Devices  Type of devices:  All fall risk precautions in place, Call light within reach, Gait belt, Patient at risk for falls, Nurse notified, Left in chair, Chair alarm in place  Restraints  Initially in place: No     Therapy Time   Individual Concurrent Group Co-treatment   Time In 1402         Time Out 1440         Minutes 38         Timed Code Treatment Minutes: [address]

## 2021-06-03 NOTE — PROGRESS NOTES
Image guided Bone Marrow biopsy completed by Dr. Cecy Napoles. Pt tolerated procedure without any signs or symptoms of distress. Vital signs stable. Report given  to [facility]. Pt transported back to room in stable condition via bed. Received: Versed: 0.5 mg       Fentanyl: 50 mcg  Bandage to left lower back that is clean dry and intact. Patient to lay on back for 1 hour.      Vital Signs  Vitals:    06/03/21 1253   BP: (!) 142/65   Pulse: 66   Resp: 16   Temp:    SpO2: 97%    (vital signs in table format)    Post [place]  2 - Able to move 4 extremities voluntarily on command  2 - BP+/- 20mmHg of normal  2 - Fully awake  2 - Able to maintain oxygen saturation >92% on room air  2 - Able to breathe deeply and cough freely

## 2021-06-03 NOTE — PROGRESS NOTES
Occupational Therapy  OT follow up attempted this date. Upon entering the room, two PCAs were present and pulling pt up in bed. PCAs stated pt had been to the bathroom and had just cleaned up. Pt was asked if she would be willing to work with Monica . Pt declined. Pt was offered AROM while in bed, pt declined. Pt was educated on importance of therapy and encouraged to participate. OT will continue to follow when schedule allows and pt is willing.     Pavithra Willis, [address] [facility]

## 2021-06-03 NOTE — PROGRESS NOTES
Hospitalist Progress Note      PCP: Sisi Ramos MD    Date of Admission: 5/25/2021    Chief Complaint: Multiple falls at home c/o right sided pain     Hospital Course:   81 yo female with PMH of HTN, HLD, CAD, peripheral neuropathy, tremors, CVA x2, Parkinson's disease admitted with falls and JOAQUIN. Subjective:   Pt is on RA. Afebrile. VSS. No complaints currently. Medications:  Reviewed    Infusion Medications    sodium chloride       Scheduled Medications    pantoprazole  40 mg Oral QAM AC    potassium bicarb-citric acid  20 mEq Oral BID    amLODIPine  10 mg Oral Daily    atenolol  25 mg Oral BID    carbidopa-levodopa  1 tablet Oral BID    citalopram  20 mg Oral Daily    primidone  50 mg Oral Nightly    atorvastatin  10 mg Oral Daily    sodium chloride flush  5-40 mL Intravenous 2 times per day    heparin (porcine)  5,000 Units Subcutaneous 3 times per day     PRN Meds: sodium chloride flush, sodium chloride, promethazine **OR** ondansetron, polyethylene glycol, acetaminophen **OR** acetaminophen, oxyCODONE, morphine    No intake or output data in the 24 hours ending 06/03/21 1314    Physical Exam Performed:    BP (!) 142/65   Pulse 66   Temp 97.8 °F (36.6 °C) (Oral)   Resp 16   Ht 5' 4\" (1.626 m)   Wt 145 lb (65.8 kg)   SpO2 97%   BMI 24.89 kg/m²     General appearance: No apparent distress, appears stated age and cooperative. HEENT: Pupils equal, round, and reactive to light. Conjunctivae/corneas clear. Neck: Supple, with full range of motion. No jugular venous distention. Trachea midline. Respiratory:  Normal respiratory effort. Clear to auscultation, bilaterally without Rales/Wheezes/Rhonchi. Cardiovascular: Regular rate and rhythm with normal S1/S2 without murmurs, rubs or gallops. Abdomen: Soft, non-tender, non-distended with normal bowel sounds. Musculoskeletal: No clubbing, cyanosis or edema bilaterally. Full range of motion without deformity.   Skin: Skin color, texture, turgor normal.  No rashes or lesions. Neurologic:  Neurovascularly intact without any focal sensory/motor deficits. Cranial nerves: II-XII intact, grossly non-focal.  Psychiatric: Alert and oriented  Capillary Refill: Brisk,3 seconds, normal   Peripheral Pulses: +2 palpable, equal bilaterally       Labs:   Recent Labs     06/01/21  0906 06/02/21  0554 06/03/21  0719   WBC 7.1 8.6 7.0   HGB 8.9* 8.6* 8.5*   HCT 26.7* 26.0* 26.0*    418 367     Recent Labs     06/01/21  0906 06/02/21  0554 06/03/21  0719    137 140   K 3.8 4.3 4.2    101 103   CO2 26 27 27   BUN 26* 26* 31*   CREATININE 2.1* 2.3* 2.4*   CALCIUM 8.9 8.9 8.8     Urinalysis:      Lab Results   Component Value Date    NITRU Negative 05/25/2021    WBCUA 21-50 05/25/2021    BACTERIA Rare 05/25/2021    RBCUA 3-4 05/25/2021    BLOODU TRACE-INTACT 05/25/2021    SPECGRAV 1.010 05/25/2021    GLUCOSEU Negative 05/25/2021       Radiology:  IR FLUOROSCOPY LESS THAN 1 HOUR   Final Result   Intraprocedural fluoroscopic spot images as above. See separate procedure   report for more information. US RENAL COMPLETE   Final Result   No hydronephrosis      2.7 cm region in the central right kidney mildly different echogenicity than   the remainder of the renal parenchyma. This most likely represents normal   renal parenchyma but a mass could not be excluded. A CT of the kidneys would   be helpful for further evaluation. Otherwise, unremarkable appearing kidneys and bladder         XR CHEST PORTABLE   Final Result   Bibasilar atelectasis or, less likely, pneumonia.            Assessment/Plan:    Active Hospital Problems    Diagnosis     Anemia of chronic disease [D63.8]     Occult blood positive stool [R19.5]     Subepithelial lesion of stomach [K31.9]     Gastritis without bleeding [K29.70]     Hiatal hernia [K44.9]     Acute urinary tract infection [N39.0]     Parkinson's disease ([address] [G20]     Recurrent falls [R29.6] Frequent falls in pt with Parkinson's disease:  - Continue her home sinemet.   - PT/OT evaluations   - Continue supportive care. Acute kidney injury (improved):  - Etiology: possibly prerenal. Cr on admission was 4.0, previously normal, current Cr 2.4.   - Pt is status post IVFs. - Renal US showed no hydronephrosis but did demonstrate a 2.7 cm area of different echogenicity concerning for mass. - Nephrology consulted/following. Considering CT for further evaluation. IgG lambda monoclonal gammopathy:  - Oncology consulted, plan for bone marrow biopsy today. Anemia:  - Hemoccult positive. GI consulted/following. 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 Path negative for malignancy. - Continue daily PPI. - Hemoglobin is stable with no evidence of active bleeding. Monitor CBC closely. Possible sepsis (ruled out):  - Urine culture showed mixed mechelle. S/p 3 doses of IV rocephin. Hypokalemia/hypomagnesemia:  - Monitor and replete as needed. Hypertension:  - Sub-optimal control.   - Continue amlodipine, atenolol and hydralazine.   - Monitor closely. DVT Prophylaxis: SQ heparin   Diet: Diet NPO  Code Status: Full Code    PT/OT Eval Status: recs for SNF     Dispo - ~2 days pending workup / specialist input.     [address] [facility], [facility] - CNP

## 2021-06-03 NOTE — PROGRESS NOTES
ONCOLOGY HEMATOLOGY CARE PROGRESS NOTE      SUBJECTIVE:     Bone marrow later today. Patient has no other complaints or questions. Path from EGD was benign. ROS:     Constitutional:  No weight loss, No fever, No chills, No night sweats. Energy level good. Eyes:  No impairment or change in vision  ENT / Mouth:  No pain, abnormal ulceration, bleeding, nasal drip or change in voice or hearing  Cardiovascular:  No chest pain, palpitations, new edema, or calf discomfort  Respiratory:  No pain, hemoptysis, change to breathing  Breast:  No pain, discharge, change in appearance or texture  Gastrointestinal:  No pain, cramping, jaundice, change to eating and bowel habits  Urinary:  No pain, bleeding or change in continence  Genitalia: No pain, bleeding or discharge  Musculoskeletal:  No redness, pain, edema or weakness  Skin:  No pruritus, rash, change to nodules or lesions  Neurologic:  No discomfort, change in mental status, speech, sensory or motor activity  Psychiatric:  No change in concentration or change to affect or mood  Endocrine:  No hot flashes, increased thirst, or change to urine production  Hematologic: No petechiae, ecchymosis or bleeding  Lymphatic:  No lymphadenopathy or lymphedema  Allergy / Immunologic:  No eczema, hives, frequent or recurrent infections    OBJECTIVE        Physical    VITALS:  /66   Pulse 64   Temp 97.8 °F (36.6 °C) (Oral)   Resp 16   Ht 5' 4\" (1.626 m)   Wt 145 lb (65.8 kg)   SpO2 94%   BMI 24.89 kg/m²   TEMPERATURE:  Current - Temp: 97.8 °F (36.6 °C);  Max - Temp  Av.8 °F (36.6 °C)  Min: 97.5 °F (36.4 °C)  Max: 98 °F (36.7 °C)  PULSE OXIMETRY RANGE: SpO2  Av.2 %  Min: 90 %  Max: 94 %  24HR INTAKE/OUTPUT:    No intake or output data in the 24 hours ending 21 1236    CONSTITUTIONAL: awake, alert, cooperative, no apparent distress   EYES: pupils equal, round and reactive to light, sclera clear and conjunctiva normal villous architecture. - Negative for duodenitis, changes of celiac disease, dysplasia, or   malignancy. B. Stomach, biopsies:   - Mild chronic gastritis. - Negative for dysplasia or malignancy. COMMENT:   The histologic features of Helicobacter pylori gastritis are   not present in specimen B.      BUCCA/BUCCA       Preoperative Diagnosis:  Blood in stool, anemia   Postoperative Diagnosis:  Blood in stool, anemia   ASSESSMENT AND PLAN:    1.) IgG lambda monoclonal gammopathy  - An IgG-type paraprotein <1 g/dL with normal kappa/lambda ratio is low risk. Unlikely to be myeloma. - The patient is anemic and in renal failure. The fact that her labs were normal 1 month ago and her M spike is so low suggests an acute event other than myeloma. Still myeloma is possible. - Could get a bone marrow if the patient/family is interested. Doubt there will be a large population of plasma cells that will define active myeloma by itself. - Patient and son now want a bone marrow bx done to help clarify the current ongoing medical issues for decision making purposes. Bone marrow bx ordered with myeloma panels requested. Bone marrow to be done today 6/2      2.) Anemia  - In the setting of FOBT pos.   - s/p EGD with mild gastritis and bx taken - path above and benign.   - may need EUS to eval for possible gastric sub-epithelial lesion for possible GIST vs leiomyoma   - check EPO levels in AM - pending   - iron studies are mixed with Ferritin is 270; iron stores are 71; sat 37 percent     Recommendations from GI:  -Continue Protonix 40 mg orally daily   -To consider elective endoscopic ultrasound to further evaluate gastric subepithelial lesion if patient and family agreeable. -Clinical watch with PRBC transfusion as needed. No PRBC needed today ; Hgb is 8.5     3. Renal Mass 2.7 cm R ; mass not excluded   - needs CT imaging with contrast when creat improved, creat now 2.4.  Avoid nephrotoxins per renal. Work up of mass pending goals of care and results of bone marrow bx.      Thank you for asking me to see the patient.        Await palliative care consult - goals of care discussion       ONCOLOGIC DISPOSITION: pending bone marrow and goals of care     Kenny Rahman CNP   Please contact through [facility]

## 2021-06-03 NOTE — SEDATION DOCUMENTATION
Time out completed prior to procedure. Pt was place prone on the table. Pt was placed on all cardiac monitoring. Pt placed on 2 L NC for sedation. Lab / pathology at bedside.

## 2021-06-04 LAB
ANION GAP SERPL CALCULATED.3IONS-SCNC: 12 MMOL/L (ref 3–16)
BUN BLDV-MCNC: 32 MG/DL (ref 7–20)
CALCIUM SERPL-MCNC: 9.3 MG/DL (ref 8.3–10.6)
CHLORIDE BLD-SCNC: 100 MMOL/L (ref 99–110)
CO2: 27 MMOL/L (ref 21–32)
CREAT SERPL-MCNC: 2.6 MG/DL (ref 0.6–1.2)
ERYTHROPOIETIN: 11 MU/ML (ref 4–27)
GFR AFRICAN AMERICAN: 21
GFR NON-AFRICAN AMERICAN: 17
GLUCOSE BLD-MCNC: 85 MG/DL (ref 70–99)
HCT VFR BLD CALC: 27.7 % (ref 36–48)
HEMOGLOBIN: 9.2 G/DL (ref 12–16)
MCH RBC QN AUTO: 26.1 PG (ref 26–34)
MCHC RBC AUTO-ENTMCNC: 33.2 G/DL (ref 31–36)
MCV RBC AUTO: 78.6 FL (ref 80–100)
PDW BLD-RTO: 15 % (ref 12.4–15.4)
PLATELET # BLD: 409 K/UL (ref 135–450)
PMV BLD AUTO: 6.7 FL (ref 5–10.5)
POTASSIUM REFLEX MAGNESIUM: 4.8 MMOL/L (ref 3.5–5.1)
RBC # BLD: 3.53 M/UL (ref 4–5.2)
SODIUM BLD-SCNC: 139 MMOL/L (ref 136–145)
WBC # BLD: 9.9 K/UL (ref 4–11)

## 2021-06-04 PROCEDURE — 6370000000 HC RX 637 (ALT 250 FOR IP): Performed by: HOSPITALIST

## 2021-06-04 PROCEDURE — 2580000003 HC RX 258: Performed by: HOSPITALIST

## 2021-06-04 PROCEDURE — 6370000000 HC RX 637 (ALT 250 FOR IP): Performed by: INTERNAL MEDICINE

## 2021-06-04 PROCEDURE — 6370000000 HC RX 637 (ALT 250 FOR IP): Performed by: REGISTERED NURSE

## 2021-06-04 PROCEDURE — 36415 COLL VENOUS BLD VENIPUNCTURE: CPT

## 2021-06-04 PROCEDURE — 85027 COMPLETE CBC AUTOMATED: CPT

## 2021-06-04 PROCEDURE — 97110 THERAPEUTIC EXERCISES: CPT

## 2021-06-04 PROCEDURE — 6360000002 HC RX W HCPCS: Performed by: HOSPITALIST

## 2021-06-04 PROCEDURE — 2580000003 HC RX 258: Performed by: INTERNAL MEDICINE

## 2021-06-04 PROCEDURE — 1200000000 HC SEMI PRIVATE

## 2021-06-04 PROCEDURE — 97530 THERAPEUTIC ACTIVITIES: CPT

## 2021-06-04 PROCEDURE — 80048 BASIC METABOLIC PNL TOTAL CA: CPT

## 2021-06-04 RX ORDER — SODIUM CHLORIDE, SODIUM GLUCONATE, SODIUM ACETATE, POTASSIUM CHLORIDE AND MAGNESIUM CHLORIDE 526; 502; 368; 37; 30 MG/100ML; MG/100ML; MG/100ML; MG/100ML; MG/100ML
INJECTION, SOLUTION INTRAVENOUS CONTINUOUS
Status: DISCONTINUED | OUTPATIENT
Start: 2021-06-04 | End: 2021-06-05 | Stop reason: HOSPADM

## 2021-06-04 RX ADMIN — ATENOLOL 25 MG: 25 TABLET ORAL at 09:04

## 2021-06-04 RX ADMIN — HEPARIN SODIUM 5000 UNITS: 5000 INJECTION INTRAVENOUS; SUBCUTANEOUS at 06:07

## 2021-06-04 RX ADMIN — CARBIDOPA AND LEVODOPA 1 TABLET: 10; 100 TABLET ORAL at 19:45

## 2021-06-04 RX ADMIN — ATORVASTATIN CALCIUM 10 MG: 10 TABLET, FILM COATED ORAL at 09:04

## 2021-06-04 RX ADMIN — CITALOPRAM HYDROBROMIDE 20 MG: 20 TABLET ORAL at 09:05

## 2021-06-04 RX ADMIN — PRIMIDONE 50 MG: 50 TABLET ORAL at 19:45

## 2021-06-04 RX ADMIN — AMLODIPINE BESYLATE 10 MG: 5 TABLET ORAL at 09:05

## 2021-06-04 RX ADMIN — CARBIDOPA AND LEVODOPA 1 TABLET: 10; 100 TABLET ORAL at 09:04

## 2021-06-04 RX ADMIN — SODIUM CHLORIDE, SODIUM GLUCONATE, SODIUM ACETATE, POTASSIUM CHLORIDE AND MAGNESIUM CHLORIDE: 526; 502; 368; 37; 30 INJECTION, SOLUTION INTRAVENOUS at 17:34

## 2021-06-04 RX ADMIN — SODIUM CHLORIDE, PRESERVATIVE FREE 10 ML: 5 INJECTION INTRAVENOUS at 09:05

## 2021-06-04 RX ADMIN — ATENOLOL 25 MG: 25 TABLET ORAL at 19:45

## 2021-06-04 RX ADMIN — PANTOPRAZOLE SODIUM 40 MG: 40 TABLET, DELAYED RELEASE ORAL at 06:07

## 2021-06-04 RX ADMIN — HEPARIN SODIUM 5000 UNITS: 5000 INJECTION INTRAVENOUS; SUBCUTANEOUS at 17:36

## 2021-06-04 RX ADMIN — POTASSIUM BICARBONATE 20 MEQ: 782 TABLET, EFFERVESCENT ORAL at 09:05

## 2021-06-04 ASSESSMENT — PAIN SCALES - GENERAL
PAINLEVEL_OUTOF10: 0
PAINLEVEL_OUTOF10: 0

## 2021-06-04 NOTE — PROGRESS NOTES
Hospitalist Progress Note      PCP: Yo Reynoso MD    Date of Admission: 5/25/2021    Chief Complaint: Multiple falls at home c/o right sided pain     Hospital Course:   79 yo female with PMH of HTN, HLD, CAD, peripheral neuropathy, tremors, CVA x2, Parkinson's disease admitted with falls and JOQAUIN. Subjective:   Pt is on RA. Afebrile. VSS. No complaints currently. Medications:  Reviewed    Infusion Medications    electrolyte-A      sodium chloride       Scheduled Medications    pantoprazole  40 mg Oral QAM AC    amLODIPine  10 mg Oral Daily    atenolol  25 mg Oral BID    carbidopa-levodopa  1 tablet Oral BID    citalopram  20 mg Oral Daily    primidone  50 mg Oral Nightly    atorvastatin  10 mg Oral Daily    sodium chloride flush  5-40 mL Intravenous 2 times per day    heparin (porcine)  5,000 Units Subcutaneous 3 times per day     PRN Meds: sodium chloride flush, sodium chloride, promethazine **OR** ondansetron, polyethylene glycol, acetaminophen **OR** acetaminophen, oxyCODONE, morphine      Intake/Output Summary (Last 24 hours) at 6/4/2021 1628  Last data filed at 6/4/2021 0905  Gross per 24 hour   Intake 60 ml   Output    Net 60 ml       Physical Exam Performed:    BP (!) 168/63   Pulse 61   Temp 97.7 °F (36.5 °C) (Oral)   Resp 14   Ht 5' 4\" (1.626 m)   Wt 145 lb (65.8 kg)   SpO2 95%   BMI 24.89 kg/m²     General appearance: No apparent distress, appears stated age and cooperative. HEENT: Pupils equal, round, and reactive to light. Conjunctivae/corneas clear. Neck: Supple, with full range of motion. No jugular venous distention. Trachea midline. Respiratory:  Normal respiratory effort. Clear to auscultation, bilaterally without Rales/Wheezes/Rhonchi. Cardiovascular: Regular rate and rhythm with normal S1/S2 without murmurs, rubs or gallops. Abdomen: Soft, non-tender, non-distended with normal bowel sounds. Musculoskeletal: No clubbing, cyanosis or edema bilaterally. Parkinson's disease ([facility] [address] [G20]     Recurrent falls [R29.6]        Frequent falls in pt with Parkinson's disease:  - Continue her home sinemet.   - PT/OT evaluations   - Continue supportive care. Acute kidney injury:  - Etiology: possibly prerenal. Cr on admission was 4.0, previously normal, current Cr 2.6.   - Pt was started on IV fluids then dc'd. Serum Cr is up-trending, plan to restart IV fluids per Nephro. - Renal US showed no hydronephrosis but did demonstrate a 2.7 cm area of different echogenicity concerning for mass. - Nephrology consulted/following. Considering CT for further evaluation. IgG lambda monoclonal gammopathy:  - Oncology consulted, s/p BMP -- f/u path. Anemia:  - Hemoccult positive. GI consulted/following. 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 Path negative for malignancy. - Continue daily PPI. - Hemoglobin is stable with no evidence of active bleeding. Monitor CBC closely. Possible sepsis (ruled out):  - Urine culture showed mixed mechelle. S/p 3 doses of IV rocephin. Hypokalemia/hypomagnesemia:  - Monitor and replete as needed. Hypertension:  - Sub-optimal control.   - Continue amlodipine, atenolol and hydralazine.   - Monitor closely. DVT Prophylaxis: SQ heparin   Diet: ADULT DIET; Regular  Code Status: Full Code    PT/OT Eval Status: recs for SNF     Dispo - ~1-2 days pending specialist input.     [facility], [facility] - CNP

## 2021-06-04 NOTE — PROGRESS NOTES
PMH:   Past medical history, surgical history, social history, family history are reviewed and updated as appropriate. Reviewed current medication list.   Allergies reviewed and updated as needed. PE:   Vitals:    06/04/21 1111   BP: 118/65   Pulse:    Resp:    Temp:    SpO2:        General appearance: frail femal in NAD, Comfortable. Awake and alert   HEENT: EOM intact, no icterus. Trachea is midline. Neck : No masses, appears symmetrical, no JVD, trachea is midline  Respiratory: Respiratory effort appears normal, bilateral equal chest rise  Cardiovascular: Ausculation shows RR, no edema  Abdomen: No visible mass or tenderness, non distended. Musculoskeletal:  Joints with no swelling or deformity. Skin:no rashes, ulcers, induration, no jaundice. Neuro: Appropriate responses.  CN 2-12 grossly intact      Lab Results   Component Value Date    CREATININE 2.6 (H) 06/04/2021    BUN 32 (H) 06/04/2021     06/04/2021    K 4.8 06/04/2021     06/04/2021    CO2 27 06/04/2021      Lab Results   Component Value Date    WBC 9.9 06/04/2021    HGB 9.2 (L) 06/04/2021    HCT 27.7 (L) 06/04/2021    MCV 78.6 (L) 06/04/2021     06/04/2021     Lab Results   Component Value Date    CALCIUM 9.3 06/04/2021

## 2021-06-04 NOTE — CARE COORDINATION
CM met with pt and son at bedside to discuss DCP. Both wanting to discharge home. Pt will have 24/7 care at home with son. OT working with pt and stating pt is doing well to go home. Both in agreement with [facility] [place]. with no agency preference. OK for referral to [facility]. Spoke to [facility]. Therapy recs RW, DME order placed spoke to Dora Coy with [employer].  CM following-Ginette Coleman, RN

## 2021-06-04 NOTE — CARE COORDINATION
[facility]    Referral received from  to follow for home care services. I will follow for needs, and speak with patient to verify demos.     Zohreh Andrade RN, BSN CTN  [facility] [phone]

## 2021-06-04 NOTE — PROGRESS NOTES
ONCOLOGY HEMATOLOGY CARE PROGRESS NOTE      SUBJECTIVE:     Hgb 9. Bone marrow done 6/3- no issues. Feels well. May leave today. ROS:     Constitutional:  No weight loss, No fever, No chills, No night sweats. Energy level good. Eyes:  No impairment or change in vision  ENT / Mouth:  No pain, abnormal ulceration, bleeding, nasal drip or change in voice or hearing  Cardiovascular:  No chest pain, palpitations, new edema, or calf discomfort  Respiratory:  No pain, hemoptysis, change to breathing  Breast:  No pain, discharge, change in appearance or texture  Gastrointestinal:  No pain, cramping, jaundice, change to eating and bowel habits  Urinary:  No pain, bleeding or change in continence  Genitalia: No pain, bleeding or discharge  Musculoskeletal:  No redness, pain, edema or weakness  Skin:  No pruritus, rash, change to nodules or lesions  Neurologic:  No discomfort, change in mental status, speech, sensory or motor activity  Psychiatric:  No change in concentration or change to affect or mood  Endocrine:  No hot flashes, increased thirst, or change to urine production  Hematologic: No petechiae, ecchymosis or bleeding  Lymphatic:  No lymphadenopathy or lymphedema  Allergy / Immunologic:  No eczema, hives, frequent or recurrent infections    OBJECTIVE        Physical    VITALS:  /60   Pulse 61   Temp 97.9 °F (36.6 °C) (Oral)   Resp 14   Ht 5' 4\" (1.626 m)   Wt 145 lb (65.8 kg)   SpO2 92%   BMI 24.89 kg/m²   TEMPERATURE:  Current - Temp: 97.9 °F (36.6 °C);  Max - Temp  Av.9 °F (36.6 °C)  Min: 97.7 °F (36.5 °C)  Max: 98.3 °F (36.8 °C)  PULSE OXIMETRY RANGE: SpO2  Av.7 %  Min: 92 %  Max: 100 %  24HR INTAKE/OUTPUT:      Intake/Output Summary (Last 24 hours) at 2021 1056  Last data filed at 2021 0905  Gross per 24 hour   Intake 60 ml   Output    Net 60 ml       CONSTITUTIONAL: awake, alert, cooperative, no apparent distress   EYES: pupils equal, round and reactive to light, sclera clear and conjunctiva normal  ENT: Normocephalic, without obvious abnormality, atraumatic  NECK: supple, symmetrical, no jugular venous distension and no carotid bruits   HEMATOLOGIC/LYMPHATIC: no cervical, supraclavicular or axillary lymphadenopathy   LUNGS: no increased work of breathing and clear to auscultation   CARDIOVASCULAR: regular rate and rhythm, normal S1 and S2, no murmur noted  ABDOMEN: normal bowel sounds x 4, soft, non-distended, non-tender, no masses palpated, no hepatosplenomgaly   MUSCULOSKELETAL: full range of motion noted, tone is normal  NEUROLOGIC: awake, alert, oriented to name, place and time. Motor skills grossly intact. SKIN: Normal skin color, texture, turgor and no jaundice. appears intact   EXTREMITIES: no LE edema       Data      Recent Labs     06/02/21  0554 06/03/21  0719 06/04/21  0618   WBC 8.6 7.0 9.9   HGB 8.6* 8.5* 9.2*   HCT 26.0* 26.0* 27.7*    367 409   MCV 77.7* 78.4* 78.6*        Recent Labs     06/02/21  0554 06/03/21  0719 06/04/21  0618    140 139   K 4.3 4.2 4.8    103 100   CO2 27 27 27   BUN 26* 31* 32*   CREATININE 2.3* 2.4* 2.6*     No results for input(s): AST, ALT, ALB, BILIDIR, BILITOT, ALKPHOS in the last 72 hours.     Magnesium:    Lab Results   Component Value Date    MG 1.70 05/30/2021    MG 1.60 05/30/2021    MG 1.60 05/29/2021         Problem List  Patient Active Problem List   Diagnosis    Essential and other specified forms of tremor    Hereditary and idiopathic peripheral neuropathy    Abnormality of gait    Essential hypertension    Mixed hyperlipidemia    Dysthymia    Polyneuropathy    Essential hypertension    CAD in native artery    Recurrent falls    Acute urinary tract infection    Parkinson's disease ([facility] [address]    Anemia of chronic disease    Occult blood positive stool    Subepithelial lesion of stomach    Gastritis without bleeding    Hiatal hernia     FINAL DIAGNOSIS: A. Duodenum, biopsies:   - Benign small bowel mucosa with normal villous architecture. - Negative for duodenitis, changes of celiac disease, dysplasia, or   malignancy. B. Stomach, biopsies:   - Mild chronic gastritis. - Negative for dysplasia or malignancy. COMMENT:   The histologic features of Helicobacter pylori gastritis are   not present in specimen B.      BUCCA/BUCCA       Preoperative Diagnosis:  Blood in stool, anemia   Postoperative Diagnosis:  Blood in stool, anemia   ASSESSMENT AND PLAN:    1.) IgG lambda monoclonal gammopathy  - An IgG-type paraprotein <1 g/dL with normal kappa/lambda ratio is low risk. Unlikely to be myeloma. - The patient is anemic and in renal failure. The fact that her labs were normal 1 month ago and her M spike is so low suggests an acute event other than myeloma. Still myeloma is possible. - Could get a bone marrow if the patient/family is interested. Doubt there will be a large population of plasma cells that will define active myeloma by itself. - Patient and son now want a bone marrow bx done to help clarify the current ongoing medical issues for decision making purposes. Bone marrow bx ordered with myeloma panels requested. Bone marrow to be done today 6/2      2.) Anemia  - In the setting of FOBT pos.   - s/p EGD with mild gastritis and bx taken - path above and benign.   - may need EUS to eval for possible gastric sub-epithelial lesion for possible GIST vs leiomyoma   - check EPO levels in AM - pending   - iron studies are mixed with Ferritin is 270; iron stores are 71; sat 37 percent     Recommendations from GI:  -Continue Protonix 40 mg orally daily   -To consider elective endoscopic ultrasound to further evaluate gastric subepithelial lesion if patient and family agreeable. -Clinical watch with PRBC transfusion as needed. No PRBC needed today ; Hgb is 8.5     3.  Renal Mass 2.7 cm R ; mass not excluded   - needs CT imaging with contrast when creat improved, creat now 2.4.  Avoid nephrotoxins per renal. Work up of mass pending goals of care and results of bone marrow bx.      Thank you for asking me to see the patient.        Await palliative care consult - goals of care discussion       ONCOLOGIC DISPOSITION: can dc today and review bone marrow results outpatient; [facility] staff to call her with bone marrow results next week- not available until likely next Wednesday 6/9     Deven Seuplveda CNP   Please contact through [facility]

## 2021-06-04 NOTE — PROGRESS NOTES
Occupational Therapy  Facility/Department: [facility] C5 - MED SURG/ORTHO  Daily Treatment Note  NAME: Silvia Corral  : 3/16/1932  MRN: [id number]    Date of Service: 2021    Discharge Recommendations:  24 hour supervision or assist, Home with Home health OT       Assessment   Performance deficits / Impairments: Decreased high-level IADLs  Assessment: pt supervision with BADL's & functional mobility with & without RW; pt with good recall of safety with sit<-->stand transfers, son verbalizes good understanding of pt's safety status;  OT Education: OT Role;Plan of Care;Precautions  Patient Education: disease specific: OT role, ADls, balance, transfers, use of Nurse call light for A with bathroom mobility/transfers  REQUIRES OT FOLLOW UP: Yes  Activity Tolerance  Activity Tolerance: Patient Tolerated treatment well  Activity Tolerance: vitals stable: sitting in chair:  BP = 118/68, HR = 61;  Safety Devices  Safety Devices in place: Yes  Type of devices: Call light within reach; Chair alarm in place; Left in chair;Nurse notified         Patient Diagnosis(es): The primary encounter diagnosis was Acute cystitis without hematuria. Diagnoses of Community acquired pneumonia, unspecified laterality, Acute renal failure, unspecified acute renal failure type ([address] and Frequent falls were also pertinent to this visit. has a past medical history of Arthritis, Asthma, Cancer ([address] Hyperlipidemia, Hypertension, Tremors of nervous system, and Unspecified cerebral artery occlusion with cerebral infarction. has a past surgical history that includes back surgery; hernia repair; Cholecystectomy; Nasal polyp surgery; Carpal tunnel release (Bilateral); Cataract removal with implant (Left, 2018); Intracapsular cataract extraction (Right, 2019); and Upper gastrointestinal endoscopy (N/A, 2021).     Restrictions  Restrictions/Precautions  Restrictions/Precautions: Fall Risk, General Precautions  Position Activity Restriction  Other position/activity restrictions: telemtry; Up with A  Subjective   General  Chart Reviewed: Yes  Patient assessed for rehabilitation services?: Yes  Response to previous treatment: Patient with no complaints from previous session  Family / Caregiver Present: Yes (son)  Referring Practitioner: Belia Lara MD  Diagnosis: falls  Subjective  Subjective: Pt in chair agreeable  General Comment  Comments: RN cleared pt for OT; pt awake in bed, agreeable to therapy  Vital Signs  Patient Currently in Pain: Denies   Orientation  Orientation  Overall Orientation Status: Within Functional Limits  Orientation Level: Oriented to place;Oriented to time;Oriented to situation;Oriented to person  Objective    ADL  Grooming: Supervision (standing at sink to brush teeth, wash hands)  LE Dressing: Independent (seated to doff/caimlo socks)  Additional Comments: pt with good safety awareness with safe hand placement sit<-->stand transfers        Balance  Sitting Balance: Supervision  Standing Balance: Supervision (with & without AD)  Standing Balance  Time: 2 minutes x 2  Activity: bathroom & functional mobility in hallway  Functional Mobility  Functional - Mobility Device: Rolling Walker  Activity: To/from bathroom  Assist Level: Supervision  Bed mobility  Supine to Sit: Modified independent (to RIght with use of bed rail)  Sit to Supine: Unable to assess (Left up in chair, pt agreeable)  Scooting: Modified independent  Transfers  Sit to stand: Supervision  Stand to sit: Supervision                       Cognition  Overall Cognitive Status: WFL  Arousal/Alertness: Appropriate responses to stimuli  Following Commands: Follows one step commands consistently  Attention Span: Appears intact  Memory: Appears intact  Safety Judgement: Good awareness of safety precautions  Insights: Fully aware of deficits  Initiation: Does not require cues  Sequencing: Does not require cues       Type of ROM/Therapeutic Exercise: AROM; Free

## 2021-06-04 NOTE — PROGRESS NOTES
Delivered walker to [facility] room.   Thanks for the referral.  Khushialeen File  6/4/2021

## 2021-06-05 VITALS
HEART RATE: 54 BPM | DIASTOLIC BLOOD PRESSURE: 64 MMHG | OXYGEN SATURATION: 96 % | HEIGHT: 64 IN | SYSTOLIC BLOOD PRESSURE: 151 MMHG | RESPIRATION RATE: 14 BRPM | TEMPERATURE: 97.8 F | WEIGHT: 145 LBS | BODY MASS INDEX: 24.75 KG/M2

## 2021-06-05 LAB
ANION GAP SERPL CALCULATED.3IONS-SCNC: 13 MMOL/L (ref 3–16)
BUN BLDV-MCNC: 32 MG/DL (ref 7–20)
CALCIUM SERPL-MCNC: 8.6 MG/DL (ref 8.3–10.6)
CHLORIDE BLD-SCNC: 101 MMOL/L (ref 99–110)
CO2: 26 MMOL/L (ref 21–32)
CREAT SERPL-MCNC: 2.2 MG/DL (ref 0.6–1.2)
GFR AFRICAN AMERICAN: 25
GFR NON-AFRICAN AMERICAN: 21
GLUCOSE BLD-MCNC: 84 MG/DL (ref 70–99)
HCT VFR BLD CALC: 25.5 % (ref 36–48)
HEMOGLOBIN: 8.5 G/DL (ref 12–16)
MCH RBC QN AUTO: 26 PG (ref 26–34)
MCHC RBC AUTO-ENTMCNC: 33.2 G/DL (ref 31–36)
MCV RBC AUTO: 78.1 FL (ref 80–100)
PDW BLD-RTO: 14.9 % (ref 12.4–15.4)
PLATELET # BLD: 319 K/UL (ref 135–450)
PMV BLD AUTO: 6.7 FL (ref 5–10.5)
POTASSIUM REFLEX MAGNESIUM: 3.8 MMOL/L (ref 3.5–5.1)
RBC # BLD: 3.26 M/UL (ref 4–5.2)
SODIUM BLD-SCNC: 140 MMOL/L (ref 136–145)
WBC # BLD: 7.3 K/UL (ref 4–11)

## 2021-06-05 PROCEDURE — 6370000000 HC RX 637 (ALT 250 FOR IP): Performed by: HOSPITALIST

## 2021-06-05 PROCEDURE — 2580000003 HC RX 258: Performed by: INTERNAL MEDICINE

## 2021-06-05 PROCEDURE — 80048 BASIC METABOLIC PNL TOTAL CA: CPT

## 2021-06-05 PROCEDURE — 6360000002 HC RX W HCPCS: Performed by: HOSPITALIST

## 2021-06-05 PROCEDURE — 6370000000 HC RX 637 (ALT 250 FOR IP): Performed by: REGISTERED NURSE

## 2021-06-05 PROCEDURE — 36415 COLL VENOUS BLD VENIPUNCTURE: CPT

## 2021-06-05 PROCEDURE — 85027 COMPLETE CBC AUTOMATED: CPT

## 2021-06-05 RX ORDER — PANTOPRAZOLE SODIUM 40 MG/1
40 TABLET, DELAYED RELEASE ORAL
Qty: 30 TABLET | Refills: 3 | Status: SHIPPED | OUTPATIENT
Start: 2021-06-06

## 2021-06-05 RX ADMIN — HEPARIN SODIUM 5000 UNITS: 5000 INJECTION INTRAVENOUS; SUBCUTANEOUS at 08:13

## 2021-06-05 RX ADMIN — HEPARIN SODIUM 5000 UNITS: 5000 INJECTION INTRAVENOUS; SUBCUTANEOUS at 00:13

## 2021-06-05 RX ADMIN — PANTOPRAZOLE SODIUM 40 MG: 40 TABLET, DELAYED RELEASE ORAL at 06:32

## 2021-06-05 RX ADMIN — AMLODIPINE BESYLATE 10 MG: 5 TABLET ORAL at 08:06

## 2021-06-05 RX ADMIN — SODIUM CHLORIDE, SODIUM GLUCONATE, SODIUM ACETATE, POTASSIUM CHLORIDE AND MAGNESIUM CHLORIDE: 526; 502; 368; 37; 30 INJECTION, SOLUTION INTRAVENOUS at 06:32

## 2021-06-05 RX ADMIN — CITALOPRAM HYDROBROMIDE 20 MG: 20 TABLET ORAL at 08:09

## 2021-06-05 RX ADMIN — ATORVASTATIN CALCIUM 10 MG: 10 TABLET, FILM COATED ORAL at 08:06

## 2021-06-05 RX ADMIN — CARBIDOPA AND LEVODOPA 1 TABLET: 10; 100 TABLET ORAL at 08:06

## 2021-06-05 RX ADMIN — ATENOLOL 25 MG: 25 TABLET ORAL at 08:06

## 2021-06-05 ASSESSMENT — PAIN SCALES - GENERAL: PAINLEVEL_OUTOF10: 0

## 2021-06-05 NOTE — CARE COORDINATION
CASE MANAGEMENT DISCHARGE SUMMARY      Discharge to: home with 2021 Shashi Houston ordered/agency: na    Transportation:    Family/car: private    [facility]     Patient: yes       Facility/Agency, name:  [facility] RN, name: Aiyana Hickman     Note: Discharging nurse to complete FRENCH, reconcile AVS, and place final copy with patient's discharge packet. RN to ensure that written prescriptions for  Level II medications are sent with patient to the facility as per protocol.

## 2021-06-05 NOTE — PROGRESS NOTES
[facility] NEPHROLOGY                                        Inpatient Progress note    Summary:   Ham Baron is being seen by nephrology for JOAQUIN. Admitted with multiple falls. She has a h/o Parkinson's disease. Has h/o HTN, HLD. Interval History  Seen and examined at bedside. She has no complaints  Wants to go home. She says she wants to resume hospice care at discharge. Cr 2.3 >2.1 >2.1 > 2.3 > 2.6 >2.2  . No edema  /64    Plan:   - renal function is stable today   There are no acute electrolyte or volume issues. Patient tells me she would never want dialysis or any aggressive treatments if cancer were discovered. They want to re engage hospice when they discharge    Encouraged patient and son to discuss more about her goals of care and the utility of ongoing testing if she does not want more treatments or procedures. Ok to discharge from my perspective. F/u 2-4 weeks        Valeria Echavarria MD  [facility] Nephrology  Office: [phone]    Assessment:     #JOAQUIN  Etiology of JOAQUIN was thought to be related to hypotensive episodes and maybe NSAID use. Does have small M spike with normal k/l ratio declining BM biopsy . Baseline Cr 0.76 in April 2021  Cr 4 on admit   Cr stable around 2 now. UA showed pyruia, 3-4 RBC, 2-5 renal epithelial cells   Urine na 61  P/cr 1.1 g    mg  Has M protein 0.5 mg/dl on SPEP. K/l ratio is normal.   Renal US showed no hydronephrosis     #Renal mass:   2.7 cm right kidney mass. CT was recommended. #IgG lambda monoclonal gammopathy  S/p bone marrow bx     #HTN  BP elevated   On amlodipine, coreg and hydral        ROS:   Positives Listed Bold. All other remaining systems are negative. Constitutional:  fever, chills, weakness, weight change, fatigue,      Skin:  rash, pruritus, hair loss, bruising, dry skin, petechiae. Head, Face, Neck   headaches, swelling,  cervical adenopathy.      Respiratory: shortness of breath, cough, or wheezing Cardiovascular: chest pain, palpitations, dizzy, edema  Gastrointestinal: nausea, vomiting, diarrhea, constipation,belly pain    Yellow skin, blood in stool  Musculoskeletal:  back pain, muscle weakness, gait problems,       joint pain or swelling. Genitourinary:  dysuria, poor urine flow, flank pain, blood in urine  Neurologic:  vertigo, TIA'S, syncope, seizures, focal weakness  Psychosocial:  insomnia, anxiety, or depression. Additional positive findings: -     PMH:   Past medical history, surgical history, social history, family history are reviewed and updated as appropriate. Reviewed current medication list.   Allergies reviewed and updated as needed. PE:   Vitals:    06/05/21 0803   BP: (!) 151/64   Pulse: 54   Resp: 14   Temp: 97.8 °F (36.6 °C)   SpO2: 96%       General appearance: frail femal in NAD, Comfortable. Awake and alert   HEENT: EOM intact, no icterus. Trachea is midline. Neck : No masses, appears symmetrical, no JVD, trachea is midline  Respiratory: Respiratory effort appears normal, bilateral equal chest rise  Cardiovascular: Ausculation shows RR, no edema  Abdomen: No visible mass or tenderness, non distended. Musculoskeletal:  Joints with no swelling or deformity. Skin:no rashes, ulcers, induration, no jaundice. Neuro: Appropriate responses.  CN 2-12 grossly intact      Lab Results   Component Value Date    CREATININE 2.2 (H) 06/05/2021    BUN 32 (H) 06/05/2021     06/05/2021    K 3.8 06/05/2021     06/05/2021    CO2 26 06/05/2021      Lab Results   Component Value Date    WBC 7.3 06/05/2021    HGB 8.5 (L) 06/05/2021    HCT 25.5 (L) 06/05/2021    MCV 78.1 (L) 06/05/2021     06/05/2021     Lab Results   Component Value Date    CALCIUM 8.6 06/05/2021

## 2021-06-05 NOTE — PROGRESS NOTES
ONCOLOGY HEMATOLOGY CARE PROGRESS NOTE      SUBJECTIVE:     Patient has no complaints. ROS:     Constitutional:  No weight loss, No fever, No chills, No night sweats. Energy level good. Eyes:  No impairment or change in vision  ENT / Mouth:  No pain, abnormal ulceration, bleeding, nasal drip or change in voice or hearing  Cardiovascular:  No chest pain, palpitations, new edema, or calf discomfort  Respiratory:  No pain, hemoptysis, change to breathing  Breast:  No pain, discharge, change in appearance or texture  Gastrointestinal:  No pain, cramping, jaundice, change to eating and bowel habits  Urinary:  No pain, bleeding or change in continence  Genitalia: No pain, bleeding or discharge  Musculoskeletal:  No redness, pain, edema or weakness  Skin:  No pruritus, rash, change to nodules or lesions  Neurologic:  No discomfort, change in mental status, speech, sensory or motor activity  Psychiatric:  No change in concentration or change to affect or mood  Endocrine:  No hot flashes, increased thirst, or change to urine production  Hematologic: No petechiae, ecchymosis or bleeding  Lymphatic:  No lymphadenopathy or lymphedema  Allergy / Immunologic:  No eczema, hives, frequent or recurrent infections    OBJECTIVE        Physical    VITALS:  BP (!) 151/64   Pulse 54   Temp 97.8 °F (36.6 °C) (Oral)   Resp 14   Ht 5' 4\" (1.626 m)   Wt 145 lb (65.8 kg)   SpO2 96%   BMI 24.89 kg/m²   TEMPERATURE:  Current - Temp: 97.8 °F (36.6 °C);  Max - Temp  Av.8 °F (36.6 °C)  Min: 97.6 °F (36.4 °C)  Max: 98.1 °F (36.7 °C)  PULSE OXIMETRY RANGE: SpO2  Av.3 %  Min: 94 %  Max: 96 %  24HR INTAKE/OUTPUT:      Intake/Output Summary (Last 24 hours) at 2021 0910  Last data filed at 2021 0659  Gross per 24 hour   Intake 1044.83 ml   Output 700 ml   Net 344.83 ml       CONSTITUTIONAL: awake, alert, cooperative, no apparent distress   EYES: pupils equal, round and reactive to light, sclera clear and conjunctiva normal  ENT: Normocephalic, without obvious abnormality, atraumatic  NECK: supple, symmetrical, no jugular venous distension and no carotid bruits   HEMATOLOGIC/LYMPHATIC: no cervical, supraclavicular or axillary lymphadenopathy   LUNGS: no increased work of breathing and clear to auscultation   CARDIOVASCULAR: regular rate and rhythm, normal S1 and S2, no murmur noted  ABDOMEN: normal bowel sounds x 4, soft, non-distended, non-tender, no masses palpated, no hepatosplenomgaly   MUSCULOSKELETAL: full range of motion noted, tone is normal  NEUROLOGIC: awake, alert, oriented to name, place and time. Motor skills grossly intact. SKIN: Normal skin color, texture, turgor and no jaundice. appears intact   EXTREMITIES: no LE edema       Data      Recent Labs     06/03/21  0719 06/04/21  0618 06/05/21  0632   WBC 7.0 9.9 7.3   HGB 8.5* 9.2* 8.5*   HCT 26.0* 27.7* 25.5*    409 319   MCV 78.4* 78.6* 78.1*        Recent Labs     06/03/21  0719 06/04/21  0618 06/05/21  0632    139 140   K 4.2 4.8 3.8    100 101   CO2 27 27 26   BUN 31* 32* 32*   CREATININE 2.4* 2.6* 2.2*     No results for input(s): AST, ALT, ALB, BILIDIR, BILITOT, ALKPHOS in the last 72 hours. Magnesium:    Lab Results   Component Value Date    MG 1.70 05/30/2021    MG 1.60 05/30/2021    MG 1.60 05/29/2021         Problem List  Patient Active Problem List   Diagnosis    Essential and other specified forms of tremor    Hereditary and idiopathic peripheral neuropathy    Abnormality of gait    Essential hypertension    Mixed hyperlipidemia    Dysthymia    Polyneuropathy    Essential hypertension    CAD in native artery    Recurrent falls    Acute urinary tract infection    Parkinson's disease ([facility] [address]    Anemia of chronic disease    Occult blood positive stool    Subepithelial lesion of stomach    Gastritis without bleeding    Hiatal hernia     FINAL DIAGNOSIS:     A.  Duodenum, biopsies:   - follow-up in the office to go over the bone marrow biopsy  -Then the family can make decisions on whether to proceed with work-up for the renal mass and a possible EGD with EUS  -Frankly, I am not convinced that she is in good enough shape to be treated and these would only be for prognosis     Sherman Ramirez MD  May be reached through [address] [place] [address]

## 2021-06-05 NOTE — PROGRESS NOTES
Hospitalist Progress Note      PCP: Anselmo Sheppard MD    Date of Admission: 5/25/2021    Chief Complaint: Multiple falls at home c/o right sided pain     Hospital Course:   79 yo female with PMH of HTN, HLD, CAD, peripheral neuropathy, tremors, CVA x2, Parkinson's disease admitted with falls and JOAQUIN. Subjective:   Pt is on RA. Afebrile. VSS. No complaints currently. Medications:  Reviewed    Infusion Medications    electrolyte-A 70 mL/hr at 06/05/21 7464    sodium chloride       Scheduled Medications    pantoprazole  40 mg Oral QAM AC    amLODIPine  10 mg Oral Daily    atenolol  25 mg Oral BID    carbidopa-levodopa  1 tablet Oral BID    citalopram  20 mg Oral Daily    primidone  50 mg Oral Nightly    atorvastatin  10 mg Oral Daily    sodium chloride flush  5-40 mL Intravenous 2 times per day    heparin (porcine)  5,000 Units Subcutaneous 3 times per day     PRN Meds: sodium chloride flush, sodium chloride, promethazine **OR** ondansetron, polyethylene glycol, acetaminophen **OR** acetaminophen, oxyCODONE, morphine      Intake/Output Summary (Last 24 hours) at 6/5/2021 1244  Last data filed at 6/5/2021 1029  Gross per 24 hour   Intake 1164.83 ml   Output 700 ml   Net 464.83 ml       Physical Exam Performed:    BP (!) 151/64   Pulse 54   Temp 97.8 °F (36.6 °C) (Oral)   Resp 14   Ht 5' 4\" (1.626 m)   Wt 145 lb (65.8 kg)   SpO2 96%   BMI 24.89 kg/m²     General appearance: No apparent distress, appears stated age and cooperative. HEENT: Pupils equal, round, and reactive to light. Conjunctivae/corneas clear. Neck: Supple, with full range of motion. No jugular venous distention. Trachea midline. Respiratory:  Normal respiratory effort. Clear to auscultation, bilaterally without Rales/Wheezes/Rhonchi. Cardiovascular: Regular rate and rhythm with normal S1/S2 without murmurs, rubs or gallops. Abdomen: Soft, non-tender, non-distended with normal bowel sounds.   Musculoskeletal: No clubbing, cyanosis or edema bilaterally. Full range of motion without deformity. Skin: Skin color, texture, turgor normal.  No rashes or lesions. Neurologic:  Neurovascularly intact without any focal sensory/motor deficits. Cranial nerves: II-XII intact, grossly non-focal.  Psychiatric: Alert and oriented  Capillary Refill: Brisk,3 seconds, normal   Peripheral Pulses: +2 palpable, equal bilaterally       Labs:   Recent Labs     06/03/21  0719 06/04/21  0618 06/05/21  0632   WBC 7.0 9.9 7.3   HGB 8.5* 9.2* 8.5*   HCT 26.0* 27.7* 25.5*    409 319     Recent Labs     06/03/21  0719 06/04/21  0618 06/05/21  0632    139 140   K 4.2 4.8 3.8    100 101   CO2 27 27 26   BUN 31* 32* 32*   CREATININE 2.4* 2.6* 2.2*   CALCIUM 8.8 9.3 8.6     Urinalysis:      Lab Results   Component Value Date    NITRU Negative 05/25/2021    WBCUA 21-50 05/25/2021    BACTERIA Rare 05/25/2021    RBCUA 3-4 05/25/2021    BLOODU TRACE-INTACT 05/25/2021    SPECGRAV 1.010 05/25/2021    GLUCOSEU Negative 05/25/2021       Radiology:  IR FLUOROSCOPY LESS THAN 1 HOUR   Final Result   Intraprocedural fluoroscopic spot images as above. See separate procedure   report for more information. US RENAL COMPLETE   Final Result   No hydronephrosis      2.7 cm region in the central right kidney mildly different echogenicity than   the remainder of the renal parenchyma. This most likely represents normal   renal parenchyma but a mass could not be excluded. A CT of the kidneys would   be helpful for further evaluation. Otherwise, unremarkable appearing kidneys and bladder         XR CHEST PORTABLE   Final Result   Bibasilar atelectasis or, less likely, pneumonia.            Assessment/Plan:    Active Hospital Problems    Diagnosis     Anemia of chronic disease [D63.8]     Occult blood positive stool [R19.5]     Subepithelial lesion of stomach [K31.9]     Gastritis without bleeding [K29.70]     Hiatal hernia [K44.9]    

## 2021-06-05 NOTE — DISCHARGE SUMMARY
Hospital Medicine Discharge Summary    Patient ID: Herminia Crawford      Patient's PCP: Kalpana Lei MD    Admit Date: 5/25/2021     Discharge Date: 6/5/2021      Admitting Physician: Caleb Loyd MD     Discharge Physician: JAROD Rodarte - CNP     Discharge Diagnoses: Active Hospital Problems    Diagnosis     Anemia of chronic disease [D63.8]     Occult blood positive stool [R19.5]     Subepithelial lesion of stomach [K31.9]     Gastritis without bleeding [K29.70]     Hiatal hernia [K44.9]     Acute urinary tract infection [N39.0]     Parkinson's disease ([address] [G20]     Recurrent falls [R29.6]        The patient was seen and examined on day of discharge and this discharge summary is in conjunction with any daily progress note from day of discharge. Hospital Course:   79 yo female with PMH of HTN, HLD, CAD, peripheral neuropathy, tremors, CVA x2, Parkinson's disease admitted with falls and JOAQUIN. Frequent falls in pt with Parkinson's disease:  - Continue her home sinemet.   - PT/OT evaluations with recs for [facility]  w/ PT/OT. - Continue supportive care.      Acute kidney injury (improved):  - Etiology: possibly prerenal. Cr on admission was 4.0, previously normal.   - Pt was started on IV fluids then dc'd. Serum Cr up-trended to 2.6 on 6/4, IV fluids were restarted per Nephro. Serum Cr currently 2.2.  - Nephrology consulted. She is to f/u with Dr. Acuna Friday as an outpt. - Renal US showed no hydronephrosis but did demonstrate a 2.7 cm area of different echogenicity concerning for mass. - Possible CT as an outpt per Hem/Onc -- more for prognosis.      IgG lambda monoclonal gammopathy:  - Oncology consulted, s/p BMP -- path is pending. Pt to f/u with Oncology as an outpt to discuss results.       Anemia:  - Hemoccult positive. GI consulted. 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  Path negative for malignancy. - Continue daily PPI.    - Hemoglobin is stable with no evidence of active bleeding.     Possible sepsis (ruled out):  - Urine culture showed mixed mechelle. S/p 3 doses of IV rocephin.      Hypokalemia/hypomagnesemia:  - Monitor and replete as needed.     Hypertension:  - Sub-optimal control.   - Continue amlodipine and atenolol. Goals of care:  - Given pt's advanced age, poor functional status and co-morbidities she would like to re-enroll with hospice after dc.        Physical Exam Performed:     BP (!) 151/64   Pulse 54   Temp 97.8 °F (36.6 °C) (Oral)   Resp 14   Ht 5' 4\" (1.626 m)   Wt 145 lb (65.8 kg)   SpO2 96%   BMI 24.89 kg/m²       General appearance:  Elderly female in no apparent distress, appears stated age and cooperative. HEENT:  Normal cephalic, atraumatic without obvious deformity. Pupils equal, round, and reactive to light. Extra ocular muscles intact. Conjunctivae/corneas clear. Neck: Supple, with full range of motion. No jugular venous distention. Trachea midline. Respiratory:  Normal respiratory effort. Clear to auscultation, bilaterally without Rales/Wheezes/Rhonchi. Cardiovascular:  Regular rate and rhythm with normal S1/S2 without murmurs, rubs or gallops. Abdomen: Soft, non-tender, non-distended with normal bowel sounds. Musculoskeletal:  No clubbing, cyanosis or edema bilaterally. Full range of motion without deformity. Skin: Skin color, texture, turgor normal.  No rashes or lesions. Neurologic:  Neurovascularly intact without any focal sensory/motor deficits. Cranial nerves: II-XII intact, grossly non-focal.  Psychiatric:  Alert and oriented, thought content appropriate, normal insight  Capillary Refill: Brisk,< 3 seconds   Peripheral Pulses: +2 palpable, equal bilaterally       Labs:  For convenience and continuity at follow-up the following most recent labs are provided:      CBC:    Lab Results   Component Value Date    WBC 7.3 06/05/2021    HGB 8.5 06/05/2021    HCT 25.5 06/05/2021     06/05/2021       Renal:    Lab Results   Component Value Date     06/05/2021    K 3.8 06/05/2021     06/05/2021    CO2 26 06/05/2021    BUN 32 06/05/2021    CREATININE 2.2 06/05/2021    CALCIUM 8.6 06/05/2021         Significant Diagnostic Studies    Radiology:   IR FLUOROSCOPY LESS THAN 1 HOUR   Final Result   Intraprocedural fluoroscopic spot images as above. See separate procedure   report for more information. US RENAL COMPLETE   Final Result   No hydronephrosis      2.7 cm region in the central right kidney mildly different echogenicity than   the remainder of the renal parenchyma. This most likely represents normal   renal parenchyma but a mass could not be excluded. A CT of the kidneys would   be helpful for further evaluation. Otherwise, unremarkable appearing kidneys and bladder         XR CHEST PORTABLE   Final Result   Bibasilar atelectasis or, less likely, pneumonia.                 Consults:     IP CONSULT TO HOSPITALIST  IP CONSULT TO CASE MANAGEMENT  IP CONSULT TO NEPHROLOGY  IP CONSULT TO ONCOLOGY  IP CONSULT TO GI  IP CONSULT TO PALLIATIVE CARE  IP CONSULT TO DIETITIAN  IP CONSULT TO HOME CARE NEEDS    Disposition:  Home with [place] [address] PT/OT    Condition at Discharge: Stable    Discharge Instructions/Follow-up:  Follow-up with PCP, Oncology and Nephrology    Code Status:  Full code     Activity: activity as tolerated    Diet: renal diet      Discharge Medications:     Discharge Medication List as of 6/5/2021  3:19 PM           Details   pantoprazole (PROTONIX) 40 MG tablet Take 1 tablet by mouth every morning (before breakfast), Disp-30 tablet, R-3Normal      amLODIPine (NORVASC) 10 MG tablet Take 1 tablet by mouth daily, Disp-30 tablet, R-3Normal              Details   carbidopa-levodopa (SINEMET)  MG per tablet Take 1 tablet by mouth 2 times daily, Disp-90 tablet, R-3Normal              Details   primidone (MYSOLINE) 50 MG tablet Take 2 tablets by mouth 2 times daily, Disp-360 tablet, R-3      citalopram (CELEXA) 20 MG tablet Take 20 mg by mouth daily. aspirin (ASPIRIN CHILDRENS) 81 MG chewable tablet Take 1 tablet by mouth daily. , Disp-30 tablet, R-0      atenolol (TENORMIN) 25 MG tablet Take 25 mg by mouth 2 times daily. simvastatin (ZOCOR) 20 MG tablet Take 20 mg by mouth nightly. Time Spent on discharge is more than 45 minutes in the examination, evaluation, counseling and review of medications and discharge plan. Signed:    JAROD Mendez - CNP   6/6/2021      Thank you Sukhwinder Lay MD for the opportunity to be involved in this patient's care. If you have any questions or concerns please feel free to contact me at [phone].

## 2021-06-07 ENCOUNTER — HOSPITAL ENCOUNTER (INPATIENT)
Age: 86
LOS: 2 days | Discharge: HOSPICE | DRG: 689 | End: 2021-06-09
Attending: EMERGENCY MEDICINE | Admitting: INTERNAL MEDICINE
Payer: MEDICARE

## 2021-06-07 ENCOUNTER — APPOINTMENT (OUTPATIENT)
Dept: CT IMAGING | Age: 86
DRG: 689 | End: 2021-06-07
Payer: MEDICARE

## 2021-06-07 ENCOUNTER — APPOINTMENT (OUTPATIENT)
Dept: GENERAL RADIOLOGY | Age: 86
DRG: 689 | End: 2021-06-07
Payer: MEDICARE

## 2021-06-07 ENCOUNTER — TELEPHONE (OUTPATIENT)
Dept: OTHER | Facility: CLINIC | Age: 86
End: 2021-06-07

## 2021-06-07 DIAGNOSIS — R53.1 GENERALIZED WEAKNESS: ICD-10-CM

## 2021-06-07 DIAGNOSIS — N10 ACUTE PYELONEPHRITIS: ICD-10-CM

## 2021-06-07 DIAGNOSIS — N17.9 ACUTE RENAL FAILURE, UNSPECIFIED ACUTE RENAL FAILURE TYPE (HCC): Primary | ICD-10-CM

## 2021-06-07 LAB
A/G RATIO: 0.8 (ref 1.1–2.2)
ALBUMIN SERPL-MCNC: 3.1 G/DL (ref 3.4–5)
ALP BLD-CCNC: 77 U/L (ref 40–129)
ALT SERPL-CCNC: 12 U/L (ref 10–40)
AMORPHOUS: ABNORMAL /HPF
ANION GAP SERPL CALCULATED.3IONS-SCNC: 14 MMOL/L (ref 3–16)
AST SERPL-CCNC: 16 U/L (ref 15–37)
BACTERIA: ABNORMAL /HPF
BASOPHILS ABSOLUTE: 0.2 K/UL (ref 0–0.2)
BASOPHILS RELATIVE PERCENT: 1 %
BILIRUB SERPL-MCNC: <0.2 MG/DL (ref 0–1)
BILIRUBIN URINE: NEGATIVE
BLOOD, URINE: ABNORMAL
BUN BLDV-MCNC: 51 MG/DL (ref 7–20)
CALCIUM SERPL-MCNC: 8.7 MG/DL (ref 8.3–10.6)
CHLORIDE BLD-SCNC: 100 MMOL/L (ref 99–110)
CLARITY: ABNORMAL
CO2: 24 MMOL/L (ref 21–32)
COLOR: YELLOW
CREAT SERPL-MCNC: 4.5 MG/DL (ref 0.6–1.2)
EKG ATRIAL RATE: 69 BPM
EKG DIAGNOSIS: NORMAL
EKG P AXIS: 50 DEGREES
EKG P-R INTERVAL: 182 MS
EKG Q-T INTERVAL: 438 MS
EKG QRS DURATION: 88 MS
EKG QTC CALCULATION (BAZETT): 469 MS
EKG R AXIS: 19 DEGREES
EKG T AXIS: 44 DEGREES
EKG VENTRICULAR RATE: 69 BPM
EOSINOPHILS ABSOLUTE: 0.1 K/UL (ref 0–0.6)
EOSINOPHILS RELATIVE PERCENT: 0.5 %
EPITHELIAL CELLS, UA: ABNORMAL /HPF (ref 0–5)
GFR AFRICAN AMERICAN: 11
GFR NON-AFRICAN AMERICAN: 9
GLOBULIN: 3.8 G/DL
GLUCOSE BLD-MCNC: 107 MG/DL (ref 70–99)
GLUCOSE URINE: NEGATIVE MG/DL
HCT VFR BLD CALC: 27.5 % (ref 36–48)
HEMOGLOBIN: 8.9 G/DL (ref 12–16)
KETONES, URINE: NEGATIVE MG/DL
LACTIC ACID: 0.9 MMOL/L (ref 0.4–2)
LEUKOCYTE ESTERASE, URINE: ABNORMAL
LYMPHOCYTES ABSOLUTE: 0.7 K/UL (ref 1–5.1)
LYMPHOCYTES RELATIVE PERCENT: 3.6 %
MAGNESIUM: 1.8 MG/DL (ref 1.8–2.4)
MCH RBC QN AUTO: 25.3 PG (ref 26–34)
MCHC RBC AUTO-ENTMCNC: 32.2 G/DL (ref 31–36)
MCV RBC AUTO: 78.6 FL (ref 80–100)
MICROSCOPIC EXAMINATION: YES
MONOCYTES ABSOLUTE: 0.7 K/UL (ref 0–1.3)
MONOCYTES RELATIVE PERCENT: 3.7 %
MUCUS: ABNORMAL /LPF
NEUTROPHILS ABSOLUTE: 16.7 K/UL (ref 1.7–7.7)
NEUTROPHILS RELATIVE PERCENT: 91.2 %
NITRITE, URINE: NEGATIVE
PDW BLD-RTO: 15.3 % (ref 12.4–15.4)
PH UA: 6 (ref 5–8)
PLATELET # BLD: 225 K/UL (ref 135–450)
PMV BLD AUTO: 7.1 FL (ref 5–10.5)
POTASSIUM SERPL-SCNC: 4.1 MMOL/L (ref 3.5–5.1)
PROCALCITONIN: 80.43 NG/ML (ref 0–0.15)
PROTEIN UA: >=300 MG/DL
RBC # BLD: 3.5 M/UL (ref 4–5.2)
RBC UA: ABNORMAL /HPF (ref 0–4)
SODIUM BLD-SCNC: 138 MMOL/L (ref 136–145)
SPECIFIC GRAVITY UA: 1.02 (ref 1–1.03)
TOTAL PROTEIN: 6.9 G/DL (ref 6.4–8.2)
TROPONIN: 0.04 NG/ML
URINE TYPE: ABNORMAL
UROBILINOGEN, URINE: 0.2 E.U./DL
WBC # BLD: 18.3 K/UL (ref 4–11)
WBC UA: >100 /HPF (ref 0–5)

## 2021-06-07 PROCEDURE — 84145 PROCALCITONIN (PCT): CPT

## 2021-06-07 PROCEDURE — 83735 ASSAY OF MAGNESIUM: CPT

## 2021-06-07 PROCEDURE — 99284 EMERGENCY DEPT VISIT MOD MDM: CPT

## 2021-06-07 PROCEDURE — 2580000003 HC RX 258: Performed by: INTERNAL MEDICINE

## 2021-06-07 PROCEDURE — 84484 ASSAY OF TROPONIN QUANT: CPT

## 2021-06-07 PROCEDURE — 87040 BLOOD CULTURE FOR BACTERIA: CPT

## 2021-06-07 PROCEDURE — 2580000003 HC RX 258: Performed by: EMERGENCY MEDICINE

## 2021-06-07 PROCEDURE — 70450 CT HEAD/BRAIN W/O DYE: CPT

## 2021-06-07 PROCEDURE — 71045 X-RAY EXAM CHEST 1 VIEW: CPT

## 2021-06-07 PROCEDURE — 74176 CT ABD & PELVIS W/O CONTRAST: CPT

## 2021-06-07 PROCEDURE — 2700000000 HC OXYGEN THERAPY PER DAY

## 2021-06-07 PROCEDURE — 81001 URINALYSIS AUTO W/SCOPE: CPT

## 2021-06-07 PROCEDURE — 85025 COMPLETE CBC W/AUTO DIFF WBC: CPT

## 2021-06-07 PROCEDURE — 94761 N-INVAS EAR/PLS OXIMETRY MLT: CPT

## 2021-06-07 PROCEDURE — 93010 ELECTROCARDIOGRAM REPORT: CPT | Performed by: INTERNAL MEDICINE

## 2021-06-07 PROCEDURE — 96365 THER/PROPH/DIAG IV INF INIT: CPT

## 2021-06-07 PROCEDURE — 93005 ELECTROCARDIOGRAM TRACING: CPT | Performed by: EMERGENCY MEDICINE

## 2021-06-07 PROCEDURE — 2060000000 HC ICU INTERMEDIATE R&B

## 2021-06-07 PROCEDURE — 83605 ASSAY OF LACTIC ACID: CPT

## 2021-06-07 PROCEDURE — 6360000002 HC RX W HCPCS: Performed by: EMERGENCY MEDICINE

## 2021-06-07 PROCEDURE — 6360000002 HC RX W HCPCS: Performed by: INTERNAL MEDICINE

## 2021-06-07 PROCEDURE — 80053 COMPREHEN METABOLIC PANEL: CPT

## 2021-06-07 RX ORDER — 0.9 % SODIUM CHLORIDE 0.9 %
1000 INTRAVENOUS SOLUTION INTRAVENOUS ONCE
Status: COMPLETED | OUTPATIENT
Start: 2021-06-07 | End: 2021-06-07

## 2021-06-07 RX ORDER — LINEZOLID 2 MG/ML
600 INJECTION, SOLUTION INTRAVENOUS EVERY 12 HOURS
Status: DISCONTINUED | OUTPATIENT
Start: 2021-06-07 | End: 2021-06-09 | Stop reason: HOSPADM

## 2021-06-07 RX ADMIN — CEFEPIME 2000 MG: 2 INJECTION, POWDER, FOR SOLUTION INTRAMUSCULAR; INTRAVENOUS at 17:44

## 2021-06-07 RX ADMIN — PIPERACILLIN AND TAZOBACTAM 3375 MG: 3; .375 INJECTION, POWDER, LYOPHILIZED, FOR SOLUTION INTRAVENOUS at 23:15

## 2021-06-07 RX ADMIN — SODIUM CHLORIDE 1000 ML: 9 INJECTION, SOLUTION INTRAVENOUS at 17:44

## 2021-06-07 RX ADMIN — LINEZOLID 600 MG: 600 INJECTION, SOLUTION INTRAVENOUS at 21:42

## 2021-06-07 RX ADMIN — SODIUM CHLORIDE 1000 ML: 9 INJECTION, SOLUTION INTRAVENOUS at 19:48

## 2021-06-07 NOTE — TELEPHONE ENCOUNTER
Writer contacted ED provider Bo THOMAS   to inform of 30 day readmission risk. Writer's attempt to contact ED provider was unsuccessful. No Decision on disposition at this time.

## 2021-06-07 NOTE — CARE COORDINATION
Nonadmit [facility] referral made to [address] [place] [address] per son request:  Ruba Miguel (CHILD)   [phone]     SPOKE WITH SON THIS MORNING; HE STATES HIS MOM IS READY FOR HOSPICE; CALL PLACED TO [facility] REGARDING REQUEST [phone]  OPTION 3  JORDAN INTAKE NOTIFIED OF REFERRAL    Ranjan 55 [phone]    Kaelyn Guadalupe RN, BSN CTN  [facility] [phone]

## 2021-06-07 NOTE — ED NOTES
Per son pt with decrease urine output. Pt with weakness on her feet per son pt unable that get around. Pt is awake and alert. [place] x3 person place not time. Pt on monitor and currently has pure wick in place.  Will cont to monitor     Yovani Hill RN  06/07/21 0670

## 2021-06-07 NOTE — ED NOTES
Inserted a straight catheter using sterile technique to obtain a clean urine sample and empty the patient's bladder. Obtained approx 25 mL of urine. Sent urine to lab. Patient tolerated procedure well.      Jorge Anaya  06/07/21 4053

## 2021-06-07 NOTE — ED NOTES
Bed: 18  Expected date:   Expected time:   Means of arrival:   Comments:  Ivone Ortiz RN  06/07/21 5646

## 2021-06-07 NOTE — ED NOTES

## 2021-06-07 NOTE — ED PROVIDER NOTES
CHIEF COMPLAINT  Numbness (left leg numbness started saturday, ) and Dizziness      HISTORY OF PRESENT ILLNESS  Marcia Duron is a 80 y.o. female with a history of HTN, lipid, CAD, CVA, Parkinsons who presents to the ED complaining of numbness of the LLE, per EMS onset roughly 48hrs ago. Pt is a very poor historian. No other complaints, modifying factors or associated symptoms. I have reviewed the following from the nursing documentation. Past Medical History:   Diagnosis Date    Arthritis     Asthma     past hx    Cancer ([facility] [address]     skin    Hyperlipidemia     Hypertension     Tremors of nervous system     Unspecified cerebral artery occlusion with cerebral infarction     X 2-      Past Surgical History:   Procedure Laterality Date    BACK SURGERY      CARPAL TUNNEL RELEASE Bilateral     CATARACT REMOVAL WITH IMPLANT Left 01/08/2018    CHOLECYSTECTOMY      HERNIA REPAIR      INTRACAPSULAR CATARACT EXTRACTION Right 1/28/2019    PHACOEMULSIFICATION OF CATARACT RIGHT EYE WITH INTRAOCULAR LENS IMPLANT --BRANDON=4-- performed by Gregory Enriquez MD at [address]      and polyps from \"throat\"    UPPER GASTROINTESTINAL ENDOSCOPY N/A 6/1/2021    EGD BIOPSY performed by Gael Waldron MD at [address] [place] [address]     Family History   Problem Relation Age of Onset    Heart Disease Mother     Diabetes Father      Social History     Socioeconomic History    Marital status:       Spouse name: Not on file    Number of children: Not on file    Years of education: Not on file    Highest education level: Not on file   Occupational History    Not on file   Tobacco Use    Smoking status: Never Smoker    Smokeless tobacco: Never Used   Vaping Use    Vaping Use: Never used   Substance and Sexual Activity    Alcohol use: No    Drug use: No    Sexual activity: Not on file   Other Topics Concern    Not on file   Social History Narrative    Not on file     Social Determinants tablet Take 25 mg by mouth 2 times daily.  simvastatin (ZOCOR) 20 MG tablet Take 20 mg by mouth nightly. Facility-Administered Medications Ordered in Other Encounters   Medication Dose Route Frequency Provider Last Rate Last Admin    lidocaine PF 2 % in hylenex   Intraocular Once Marina Hebert MD         No Known Allergies    REVIEW OF SYSTEMS  10 systems reviewed, pertinent positives per HPI otherwise noted to be negative. PHYSICAL EXAM  BP (!) 106/56   Pulse 76   Temp 99 °F (37.2 °C)   Resp 22   Ht 5' 4\" (1.626 m)   Wt 145 lb (65.8 kg)   SpO2 93%   BMI 24.89 kg/m²   GENERAL APPEARANCE: Awake and alert. Cooperative. Elderly and frail. HEAD: Normocephalic. Atraumatic. EYES: PERRL. EOM's grossly intact. ENT: Mucous membranes are dry. NECK: Supple, trachea midline. HEART: RRR. Normal S1, S2. No murmurs, rubs or gallops. LUNGS: Respirations unlabored. CTAB. Good air exchange. No wheezes, rales, or rhonchi. Speaking comfortably in full sentences. ABDOMEN: Soft. Non-distended. Non-tender. No guarding or rebound. Normal Bowel sounds. EXTREMITIES: No peripheral edema. MAEE. No acute deformities. SKIN: Warm and dry. No acute rashes. NEUROLOGICAL: Alert and interactive. slight L facial droop. Extremity strength symmetrical. Sensation appears intact. PSYCHIATRIC: Normal mood and affect. LABS  I have reviewed all labs for this visit.    Results for orders placed or performed during the hospital encounter of 06/07/21   CBC Auto Differential   Result Value Ref Range    WBC 18.3 (H) 4.0 - 11.0 K/uL    RBC 3.50 (L) 4.00 - 5.20 M/uL    Hemoglobin 8.9 (L) 12.0 - 16.0 g/dL    Hematocrit 27.5 (L) 36.0 - 48.0 %    MCV 78.6 (L) 80.0 - 100.0 fL    MCH 25.3 (L) 26.0 - 34.0 pg    MCHC 32.2 31.0 - 36.0 g/dL    RDW 15.3 12.4 - 15.4 %    Platelets 067 164 - 691 K/uL    MPV 7.1 5.0 - 10.5 fL    Neutrophils % 91.2 %    Lymphocytes % 3.6 %    Monocytes % 3.7 %    Eosinophils % 0.5 %    Basophils % 1.0 %    Neutrophils Absolute 16.7 (H) 1.7 - 7.7 K/uL    Lymphocytes Absolute 0.7 (L) 1.0 - 5.1 K/uL    Monocytes Absolute 0.7 0.0 - 1.3 K/uL    Eosinophils Absolute 0.1 0.0 - 0.6 K/uL    Basophils Absolute 0.2 0.0 - 0.2 K/uL   Comprehensive Metabolic Panel   Result Value Ref Range    Sodium 138 136 - 145 mmol/L    Potassium 4.1 3.5 - 5.1 mmol/L    Chloride 100 99 - 110 mmol/L    CO2 24 21 - 32 mmol/L    Anion Gap 14 3 - 16    Glucose 107 (H) 70 - 99 mg/dL    BUN 51 (H) 7 - 20 mg/dL    CREATININE 4.5 (H) 0.6 - 1.2 mg/dL    GFR Non-African American 9 (A) >60    GFR  11 (A) >60    Calcium 8.7 8.3 - 10.6 mg/dL    Total Protein 6.9 6.4 - 8.2 g/dL    Albumin 3.1 (L) 3.4 - 5.0 g/dL    Albumin/Globulin Ratio 0.8 (L) 1.1 - 2.2    Total Bilirubin <0.2 0.0 - 1.0 mg/dL    Alkaline Phosphatase 77 40 - 129 U/L    ALT 12 10 - 40 U/L    AST 16 15 - 37 U/L    Globulin 3.8 g/dL   Urinalysis   Result Value Ref Range    Color, UA Yellow Straw/Yellow    Clarity, UA TURBID (A) Clear    Glucose, Ur Negative Negative mg/dL    Bilirubin Urine Negative Negative    Ketones, Urine Negative Negative mg/dL    Specific Gravity, UA 1.020 1.005 - 1.030    Blood, Urine MODERATE (A) Negative    pH, UA 6.0 5.0 - 8.0    Protein, UA >=300 (A) Negative mg/dL    Urobilinogen, Urine 0.2 <2.0 E.U./dL    Nitrite, Urine Negative Negative    Leukocyte Esterase, Urine MODERATE (A) Negative    Microscopic Examination YES     Urine Type NotGiven    Troponin   Result Value Ref Range    Troponin 0.04 (H) <0.01 ng/mL   Magnesium   Result Value Ref Range    Magnesium 1.80 1.80 - 2.40 mg/dL   Microscopic Urinalysis   Result Value Ref Range    Mucus, UA 1+ (A) None Seen /LPF    WBC, UA >100 (A) 0 - 5 /HPF    RBC, UA see below (A) 0 - 4 /HPF    Epithelial Cells, UA 0-1 0 - 5 /HPF    Bacteria, UA 2+ (A) None Seen /HPF    Amorphous, UA 1+ /HPF   Lactic acid, plasma   Result Value Ref Range    Lactic Acid 0.9 0.4 - 2.0 mmol/L   Procalcitonin   Result Value

## 2021-06-08 LAB
A/G RATIO: 0.7 (ref 1.1–2.2)
ALBUMIN SERPL-MCNC: 2.5 G/DL (ref 3.4–5)
ALP BLD-CCNC: 72 U/L (ref 40–129)
ALT SERPL-CCNC: 10 U/L (ref 10–40)
ANION GAP SERPL CALCULATED.3IONS-SCNC: 12 MMOL/L (ref 3–16)
AST SERPL-CCNC: 14 U/L (ref 15–37)
BILIRUB SERPL-MCNC: <0.2 MG/DL (ref 0–1)
BUN BLDV-MCNC: 54 MG/DL (ref 7–20)
CALCIUM SERPL-MCNC: 8.3 MG/DL (ref 8.3–10.6)
CHLORIDE BLD-SCNC: 105 MMOL/L (ref 99–110)
CO2: 20 MMOL/L (ref 21–32)
CREAT SERPL-MCNC: 5.8 MG/DL (ref 0.6–1.2)
GFR AFRICAN AMERICAN: 8
GFR NON-AFRICAN AMERICAN: 7
GLOBULIN: 3.7 G/DL
GLUCOSE BLD-MCNC: 119 MG/DL (ref 70–99)
HCT VFR BLD CALC: 25.5 % (ref 36–48)
HEMOGLOBIN: 8.5 G/DL (ref 12–16)
MCH RBC QN AUTO: 26.2 PG (ref 26–34)
MCHC RBC AUTO-ENTMCNC: 33.4 G/DL (ref 31–36)
MCV RBC AUTO: 78.4 FL (ref 80–100)
PDW BLD-RTO: 15.4 % (ref 12.4–15.4)
PLATELET # BLD: 217 K/UL (ref 135–450)
PMV BLD AUTO: 7.2 FL (ref 5–10.5)
POTASSIUM REFLEX MAGNESIUM: 4.2 MMOL/L (ref 3.5–5.1)
RBC # BLD: 3.26 M/UL (ref 4–5.2)
SODIUM BLD-SCNC: 137 MMOL/L (ref 136–145)
TOTAL PROTEIN: 6.2 G/DL (ref 6.4–8.2)
TROPONIN: 0.03 NG/ML
WBC # BLD: 13.7 K/UL (ref 4–11)

## 2021-06-08 PROCEDURE — 97162 PT EVAL MOD COMPLEX 30 MIN: CPT

## 2021-06-08 PROCEDURE — 97166 OT EVAL MOD COMPLEX 45 MIN: CPT

## 2021-06-08 PROCEDURE — 6360000002 HC RX W HCPCS: Performed by: INTERNAL MEDICINE

## 2021-06-08 PROCEDURE — 6370000000 HC RX 637 (ALT 250 FOR IP): Performed by: INTERNAL MEDICINE

## 2021-06-08 PROCEDURE — 80053 COMPREHEN METABOLIC PANEL: CPT

## 2021-06-08 PROCEDURE — 85027 COMPLETE CBC AUTOMATED: CPT

## 2021-06-08 PROCEDURE — 2060000000 HC ICU INTERMEDIATE R&B

## 2021-06-08 PROCEDURE — 84484 ASSAY OF TROPONIN QUANT: CPT

## 2021-06-08 PROCEDURE — 97116 GAIT TRAINING THERAPY: CPT

## 2021-06-08 PROCEDURE — 51798 US URINE CAPACITY MEASURE: CPT

## 2021-06-08 PROCEDURE — 36415 COLL VENOUS BLD VENIPUNCTURE: CPT

## 2021-06-08 PROCEDURE — 97530 THERAPEUTIC ACTIVITIES: CPT

## 2021-06-08 PROCEDURE — 2580000003 HC RX 258: Performed by: INTERNAL MEDICINE

## 2021-06-08 PROCEDURE — 97535 SELF CARE MNGMENT TRAINING: CPT

## 2021-06-08 RX ORDER — ASPIRIN 81 MG/1
81 TABLET, CHEWABLE ORAL DAILY
Status: DISCONTINUED | OUTPATIENT
Start: 2021-06-08 | End: 2021-06-09 | Stop reason: HOSPADM

## 2021-06-08 RX ORDER — ACETAMINOPHEN 325 MG/1
650 TABLET ORAL EVERY 6 HOURS PRN
Status: DISCONTINUED | OUTPATIENT
Start: 2021-06-08 | End: 2021-06-09 | Stop reason: HOSPADM

## 2021-06-08 RX ORDER — SODIUM CHLORIDE 9 MG/ML
1000 INJECTION, SOLUTION INTRAVENOUS ONCE
Status: COMPLETED | OUTPATIENT
Start: 2021-06-08 | End: 2021-06-08

## 2021-06-08 RX ORDER — SODIUM CHLORIDE 0.9 % (FLUSH) 0.9 %
10 SYRINGE (ML) INJECTION PRN
Status: DISCONTINUED | OUTPATIENT
Start: 2021-06-08 | End: 2021-06-09 | Stop reason: HOSPADM

## 2021-06-08 RX ORDER — SODIUM CHLORIDE 0.9 % (FLUSH) 0.9 %
10 SYRINGE (ML) INJECTION EVERY 12 HOURS SCHEDULED
Status: DISCONTINUED | OUTPATIENT
Start: 2021-06-08 | End: 2021-06-09 | Stop reason: HOSPADM

## 2021-06-08 RX ORDER — ATORVASTATIN CALCIUM 10 MG/1
20 TABLET, FILM COATED ORAL NIGHTLY
Status: DISCONTINUED | OUTPATIENT
Start: 2021-06-08 | End: 2021-06-09 | Stop reason: HOSPADM

## 2021-06-08 RX ORDER — ONDANSETRON 2 MG/ML
4 INJECTION INTRAMUSCULAR; INTRAVENOUS EVERY 6 HOURS PRN
Status: DISCONTINUED | OUTPATIENT
Start: 2021-06-08 | End: 2021-06-09 | Stop reason: HOSPADM

## 2021-06-08 RX ORDER — HEPARIN SODIUM 5000 [USP'U]/ML
5000 INJECTION, SOLUTION INTRAVENOUS; SUBCUTANEOUS EVERY 8 HOURS SCHEDULED
Status: DISCONTINUED | OUTPATIENT
Start: 2021-06-08 | End: 2021-06-09 | Stop reason: HOSPADM

## 2021-06-08 RX ORDER — SODIUM CHLORIDE 9 MG/ML
25 INJECTION, SOLUTION INTRAVENOUS PRN
Status: DISCONTINUED | OUTPATIENT
Start: 2021-06-08 | End: 2021-06-09 | Stop reason: HOSPADM

## 2021-06-08 RX ORDER — PANTOPRAZOLE SODIUM 40 MG/1
40 TABLET, DELAYED RELEASE ORAL
Status: DISCONTINUED | OUTPATIENT
Start: 2021-06-08 | End: 2021-06-09 | Stop reason: HOSPADM

## 2021-06-08 RX ORDER — PROMETHAZINE HYDROCHLORIDE 25 MG/1
12.5 TABLET ORAL EVERY 6 HOURS PRN
Status: DISCONTINUED | OUTPATIENT
Start: 2021-06-08 | End: 2021-06-09 | Stop reason: HOSPADM

## 2021-06-08 RX ORDER — ACETAMINOPHEN 650 MG/1
650 SUPPOSITORY RECTAL EVERY 6 HOURS PRN
Status: DISCONTINUED | OUTPATIENT
Start: 2021-06-08 | End: 2021-06-09 | Stop reason: HOSPADM

## 2021-06-08 RX ORDER — MAGNESIUM SULFATE IN WATER 40 MG/ML
2000 INJECTION, SOLUTION INTRAVENOUS PRN
Status: DISCONTINUED | OUTPATIENT
Start: 2021-06-08 | End: 2021-06-08

## 2021-06-08 RX ORDER — PRIMIDONE 250 MG/1
250 TABLET ORAL NIGHTLY
Status: DISCONTINUED | OUTPATIENT
Start: 2021-06-08 | End: 2021-06-09 | Stop reason: HOSPADM

## 2021-06-08 RX ORDER — POTASSIUM CHLORIDE 7.45 MG/ML
10 INJECTION INTRAVENOUS PRN
Status: DISCONTINUED | OUTPATIENT
Start: 2021-06-08 | End: 2021-06-08

## 2021-06-08 RX ADMIN — CARBIDOPA AND LEVODOPA 1 TABLET: 10; 100 TABLET ORAL at 09:53

## 2021-06-08 RX ADMIN — SODIUM CHLORIDE, PRESERVATIVE FREE 10 ML: 5 INJECTION INTRAVENOUS at 09:59

## 2021-06-08 RX ADMIN — ASPIRIN 81 MG: 81 TABLET, CHEWABLE ORAL at 09:50

## 2021-06-08 RX ADMIN — SODIUM CHLORIDE, PRESERVATIVE FREE 10 ML: 5 INJECTION INTRAVENOUS at 21:00

## 2021-06-08 RX ADMIN — CARBIDOPA AND LEVODOPA 1 TABLET: 10; 100 TABLET ORAL at 21:01

## 2021-06-08 RX ADMIN — LINEZOLID 600 MG: 600 INJECTION, SOLUTION INTRAVENOUS at 21:01

## 2021-06-08 RX ADMIN — PANTOPRAZOLE SODIUM 40 MG: 40 TABLET, DELAYED RELEASE ORAL at 09:51

## 2021-06-08 RX ADMIN — PRIMIDONE 250 MG: 250 TABLET ORAL at 21:01

## 2021-06-08 RX ADMIN — ATORVASTATIN CALCIUM 20 MG: 10 TABLET, FILM COATED ORAL at 21:01

## 2021-06-08 RX ADMIN — HEPARIN SODIUM 5000 UNITS: 5000 INJECTION INTRAVENOUS; SUBCUTANEOUS at 14:33

## 2021-06-08 RX ADMIN — SODIUM CHLORIDE 1000 ML: 9 INJECTION, SOLUTION INTRAVENOUS at 01:06

## 2021-06-08 RX ADMIN — HEPARIN SODIUM 5000 UNITS: 5000 INJECTION INTRAVENOUS; SUBCUTANEOUS at 23:08

## 2021-06-08 RX ADMIN — PIPERACILLIN AND TAZOBACTAM 3375 MG: 3; .375 INJECTION, POWDER, LYOPHILIZED, FOR SOLUTION INTRAVENOUS at 09:57

## 2021-06-08 RX ADMIN — PIPERACILLIN AND TAZOBACTAM 3375 MG: 3; .375 INJECTION, POWDER, LYOPHILIZED, FOR SOLUTION INTRAVENOUS at 23:08

## 2021-06-08 RX ADMIN — LINEZOLID 600 MG: 600 INJECTION, SOLUTION INTRAVENOUS at 09:53

## 2021-06-08 ASSESSMENT — PAIN SCALES - GENERAL
PAINLEVEL_OUTOF10: 0

## 2021-06-08 NOTE — PROGRESS NOTES
Hospitalist Progress Note      PCP: Papa De La Garza MD    Date of Admission: 6/7/2021    Chief Complaint: numbness    Hospital Course:   80 y.o. female who presented to [facility] with past medical history of asthma, arthritis, hypertension, hyperlipidemia, PAD presented to the ED for numbness in lower extremity left occurring for 48 hours. History is limited due to patient encephalopathic  Patient has been having decreased urine output with weakness in his feet able to get around which is very abnormal for him. Patient normally is oriented x3 but has also decreased in orientation. CODE STATUS discussed and wanted to be full code. Subjective: feeling improved today. Medications:  Reviewed    Infusion Medications    sodium chloride       Scheduled Medications    sodium chloride flush  10 mL Intravenous 2 times per day    aspirin  81 mg Oral Daily    carbidopa-levodopa  1 tablet Oral BID    primidone  250 mg Oral Nightly    atorvastatin  20 mg Oral Nightly    pantoprazole  40 mg Oral QAM AC    linezolid  600 mg Intravenous Q12H    piperacillin-tazobactam  3,375 mg Intravenous Q12H     PRN Meds: sodium chloride flush, sodium chloride, promethazine **OR** ondansetron, acetaminophen **OR** acetaminophen      Intake/Output Summary (Last 24 hours) at 6/8/2021 1052  Last data filed at 6/8/2021 0400  Gross per 24 hour   Intake 1000 ml   Output    Net 1000 ml       Physical Exam Performed:    /65   Pulse 69   Temp 98.5 °F (36.9 °C) (Oral)   Resp 14   Ht 5' 4\" (1.626 m)   Wt 131 lb 13.4 oz (59.8 kg)   SpO2 96%   BMI 22.63 kg/m²     General appearance:  mild acute distress, appears older than stated age  HEENT:   atraumatic, sclera anicteric, Conjunctivae clear. Neck: Supple,Trachea midline, no goiter  Respiratory:minimal accessory muscle usage, Normal respiratory effort.  Clear to auscultation, bilaterally without wheezing  Cardiovascular:  Regular rate and rhythm, capillary refill Pyelonephritis  - no evidence of sepsis at this time  - continue linezolid, zosyn  - blood, urine cultures pending    Leukocytosis  - 2/2 above  - improving. Continue to monitor    JOAQUIN on CKD  - 2/2 above  - continue IVF  - continue to monitor    HTN  - well controlled  - holding home lisinopril, atenolol    HLD  - continue home statin    GERD  - on PPI    Parkinson disease  - continue home sinemet    Depression  - mood stable  - holding home celexa    DVT Prophylaxis: SQH  Diet: ADULT DIET;  Regular  Code Status: Full Code    PT/OT Eval Status: ordered    Dispo - home in 1-2 days, likely with Pedro Mcqueen MD

## 2021-06-08 NOTE — CARE COORDINATION
CASE MANAGEMENT INITIAL ASSESSMENT      Reviewed chart and completed assessment via telephone with: son over the phone  Explained Case Management role/services. To patient    Primary contact information:Son Jam Seat, he goes by [facility] Decision Maker :   Primary Decision Maker: Lilian Briannamulu - [facility] - [phone]          Can this person be reached and be able to respond quickly, such as within a few minutes or hours? Yes    Admit date/status:inpatient 6/7/2021  Diagnosis:Pyelonephritis  Is this a Readmission?:  Yes      Insurance:Medicare   Precert required for SNF: No       3 night stay required: Yes    Living arrangements, Adls, care needs, prior to admission:Lives at home with son     Transportation:TBD    Durable Medical Equipment at home:  Walker_X_Cane__RTS__ BSC__Shower Chair__  02__ HHN__ CPAP__  BiPap__  Hospital Bed__ W/C___ Other__________    Services in the home and/or outpatient, prior to admission:None active, referral was placed to [facility] but it was a non admit due to son stating they prefer home hospice    PT/OT recs:Not seen yet this admission    Hospital Exemption Notification (HEN):N/A    Barriers to discharge:None identified at present    Plan/comments:Spoke with patient who states she prefers to go home at discharge and is hoping she will not need any rehab. Spoke with son who states they decided not to use skilled home care because they feel she needs home hospice . Per Natalya [facility] RN [facility] they made a referral to [facility]. Followed up with [facility] and they stated they did not yet have a referral. Initiated referral and faxed facesheet. Notified MD of need for hospice consult. At this time plan is for home with home hospice. Will follow.      ECOC on chart for MD signature

## 2021-06-08 NOTE — CONSULTS
[facility]    [facility]              [phone]                      Interval History and plan:      Cr is high  Discussed with son  Says he doesn't want her to get renal biopsy or dialysis  CT abdomen shows perinephric stranding of uncertain significance  Plan:  Continue with iv fluids  She is hospice appropriate                   Assessment :     JOAQUIN  Hypotension episodes  And NSAIDS  Also has M spike with nromal K/L ratio  Baseline cr was 0.76 on 4/21  4 on last admission  UA- pyuria  Pr/cr - 1.1 gm  USG- no hydronephrosis    Renal mass  2.7 cm kidney- right    Hypertension   BP: (115-123)/(65-67)  Pulse:  [64-69]   BP goal inpatient [id number] systolic inpatient                [facility] would like to thank Senthil Akhtar MD   for opportunity to serve this patient      Please call with questions at-   24 Hrs Answering service [phone] or  7 am- 5 pm via Perfect serve or cell phone  Robe Bassett MD          CC/reason for consult :     JOAQUIN     HPI :     Chris Stephens is a 80 y.o. female presented to   the hospital on 6/7/2021 with numbness of lower extremity. She is confused and unable to provide good detail. She is known to have Joaquin, was here last admission. Has has MGUS, and BM biopsy done. Her creatinine was high during that admission. Now cr is much higher than before. We are consulted for JOAQUIN and related issues. ROS:     Seen with- son     positives in bold   Constitutional:  fever, chills, weakness, weight change, fatigue  Skin:  rash, pruritus, hair loss, bruising, dry skin, petechiae  Head, Face, Neck   headaches, swelling,  cervical adenopathy  Respiratory: shortness of breath, cough, or wheezing  Cardiovascular: chest pain, palpitations, dizzy, edema  Gastrointestinal: nausea, vomiting, diarrhea, constipation,belly pain    Yellow skin, blood in stool  Musculoskeletal:  back pain, muscle weakness, gait problems,       joint pain or swelling. mg, Oral, Daily  carbidopa-levodopa (SINEMET)  MG per tablet 1 tablet, 1 tablet, Oral, BID  primidone (MYSOLINE) tablet 250 mg, 250 mg, Oral, Nightly  atorvastatin (LIPITOR) tablet 20 mg, 20 mg, Oral, Nightly  pantoprazole (PROTONIX) tablet 40 mg, 40 mg, Oral, QAM AC  heparin (porcine) injection 5,000 Units, 5,000 Units, Subcutaneous, 3 times per day  linezolid (ZYVOX) IVPB 600 mg, 600 mg, Intravenous, Q12H  piperacillin-tazobactam (ZOSYN) 3,375 mg in dextrose 5 % 50 mL IVPB extended infusion (mini-bag), 3,375 mg, Intravenous, Q12H  Facility-Administered Medications Ordered in Other Encounters: lidocaine PF 2 % in hylenex, , Intraocular, Once       Vitals :     Vitals:    06/08/21 1253   BP: 115/67   Pulse: 64   Resp: 16   Temp:    SpO2: 92%       I & O :       Intake/Output Summary (Last 24 hours) at 6/8/2021 1532  Last data filed at 6/8/2021 0953  Gross per 24 hour   Intake 1120 ml   Output 0 ml   Net 1120 ml        Physical Examination :     General appearance: Anxious- no, distressed- no, in good spirits- no, lethargic  HEENT: Lips- normal, teeth- ok , oral mucosa- moist  Neck : Mass- no, appears symmetrical, JVD- not visible  Respiratory: Respiratory effort-  normal, wheeze- no, crackles - no  Cardiovascular:  Ausculation- No M/R/G, Edema no  Abdomen: visible mass- no, distention- no, scar- no, tenderness- no                            hepatosplenomegaly-  no  Musculoskeletal:  clubbing no,cyanosis- no , digital ischemia- no                           muscle strength- grossly normal , tone - grossly normal  Skin: rashes- no , ulcers- no, induration- no, tightening - no  Psychiatric:  Judgement and insight- normal           AAO X 3  Additional finding: -     LABS:     Recent Labs     06/07/21  1320 06/08/21  1037   WBC 18.3* 13.7*   HGB 8.9* 8.5*   HCT 27.5* 25.5*    217     Recent Labs     06/07/21  1320 06/08/21  1037    137   K 4.1 4.2    105   CO2 24 20*   BUN 51* 54*   CREATININE 4.5*

## 2021-06-08 NOTE — PROGRESS NOTES
Physician Progress Note      PATIENT:               Urvashi Merritt  CSN #:                  [id number]  :                       3/16/1932  ADMIT DATE:       2021 12:59 PM  [address] [place] DATE:  RESPONDING  PROVIDER #:        Cadence Cleveland MD          QUERY TEXT:    Pt admitted with sepsis/acute pyelonephritis. Phill Ruelas. H&P notes-  \"History is   limited due to patient encephalopathic\"  If possible, please document in the   progress notes and discharge summary if you are evaluating and / or treating   any of the following: The medical record reflects the following:  Risk Factors: elderly, infection, cva x2, JOAQUIN  Clinical Indicators: septic-wbc 18.3 pyelonephritis, creatinine 4.5. H&P-   Patient normally is oriented x3 but has also decreased in orientation. CODE   STATUS discussed and wanted to be full\"    code. NN- Disoriented to   time;Disoriented to situation  Treatment: neurology consult, admit, safety measures, julieta scale with   monitoring, supportive care, IV antibiotics  Options provided:  -- Acute Metabolic encephalopathy POA  -- Septic encephalopathy  -- Other - I will add my own diagnosis  -- Disagree - Not applicable / Not valid  -- Disagree - Clinically unable to determine / Unknown  -- Refer to Clinical Documentation Reviewer    PROVIDER RESPONSE TEXT:    This patient has acute metabolic encephalopathy POA.     Query created by: Chris Berger on 2021 10:48 AM      Electronically signed by:  Cadence Cleveland MD 2021 11:17 AM

## 2021-06-08 NOTE — PROGRESS NOTES
Patient admitted to room 424 from ED. Patient oriented to room, call light, bed rails, phone, lights and bathroom. Patient instructed about the schedule of the day including: vital sign frequency, lab draws, possible tests, frequency of MD and staff rounds, including RN/MD rounding together at bedside, daily weights, and I &O's. Patient instructed about prescribed diet, how to use 8MENU, and television. bed alarm in place, patient aware of placement and reason. Telemetry box  in place, patient aware of placement and reason. Bed locked, in lowest position, side rails up 2/4, call light within reach. Will continue to monitor.

## 2021-06-08 NOTE — PROGRESS NOTES
Physical Therapy    Facility/Department: [facility] [address] [facility]  Initial Assessment and treatment    NAME: Herminia Crawford  : 3/16/1932  MRN: [id number]    Date of Service: 2021    Discharge Recommendations:  24 hour supervision or assist, Home with Home health PT   PT Equipment Recommendations  Equipment Needed: No  Other: unless pt does not already own RW, pt needs RW for home    Assessment   Body structures, Functions, Activity limitations: Decreased functional mobility ; Decreased strength;Decreased endurance;Decreased safe awareness;Decreased cognition;Decreased balance;Decreased posture  Assessment: Pt is a 80year old female admitted to [address] [place] [address] for Pyelonephritis and referred to PT. Pt typically ambulates around her home independently and gets assistance from her son for IADL's. pt states that if her son is not available then her nephew is there with her. Pt states that she just received a RW for home but was not currently utilizing it. pt functioning below her baseline as she need min to mod assist with mobility at present time. Will continue to see pt at [address] [place] [address] for above deficits and pt woudl benefit from home PT  at D/C to continue working on deficits. Pt is agreeale to home PT. Treatment Diagnosis: impaired mobility. Prognosis: Good  Decision Making: Medium Complexity  PT Education: Goals;PT Role;Plan of Care;Precautions;General Safety; Disease Specific Education;Equipment; Functional Mobility Training;Orientation;Transfer Training;Gait Training  Barriers to Learning: cognition  REQUIRES PT FOLLOW UP: Yes  Activity Tolerance  Activity Tolerance: Patient Tolerated treatment well  Activity Tolerance: /66, HR 63 o2 93%       Patient Diagnosis(es): The primary encounter diagnosis was Acute renal failure, unspecified acute renal failure type ([facility] [address] Diagnoses of Acute pyelonephritis and Generalized weakness were also pertinent to this visit.      has a past medical history of Arthritis, Asthma, Cancer ([address] Hyperlipidemia, Hypertension, Tremors of nervous system, and Unspecified cerebral artery occlusion with cerebral infarction. has a past surgical history that includes back surgery; hernia repair; Cholecystectomy; Nasal polyp surgery; Carpal tunnel release (Bilateral); Cataract removal with implant (Left, 01/08/2018); Intracapsular cataract extraction (Right, 1/28/2019); and Upper gastrointestinal endoscopy (N/A, 6/1/2021). Restrictions  Restrictions/Precautions  Restrictions/Precautions: Fall Risk  Position Activity Restriction  Other position/activity restrictions: IV, up with assist  Vision/Hearing  Vision: Impaired  Vision Exceptions: Wears glasses for reading  Hearing: Exceptions to [facility]  Hearing Exceptions: Left hearing aid     Subjective  General  Chart Reviewed: Yes  Patient assessed for rehabilitation services?: Yes  Additional Pertinent Hx: asthma, arthritis, hypertension, hyperlipidemia, PAD  Response To Previous Treatment: Not applicable  Family / Caregiver Present: No  Referral Date : 06/08/21  Subjective  Subjective: Pt in bed and agreeable to PT eval  Pain Screening  Patient Currently in Pain: Denies  Vital Signs  Patient Currently in Pain: Denies       Orientation  Orientation  Overall Orientation Status: Impaired  Orientation Level: Oriented to person;Oriented to place; Disoriented to time;Disoriented to situation  Social/Functional History  Social/Functional History  Lives With: Son  Type of Home: House  Home Layout: One level  Home Access: Stairs to enter with rails  Entrance Stairs - Number of Steps: 4  Entrance Stairs - Rails: Right (or left)  Bathroom Shower/Tub: Walk-in shower  Bathroom Toilet: Standard  Bathroom Equipment: Shower chair  Home Equipment: Rolling walker  ADL Assistance: Independent  Homemaking Assistance: Needs assistance (pt assist wtih laundry at times)  Ambulation Assistance: Independent  Transfer Assistance: Independent  Active : No  Patient's  Info: son Occupation: Retired  Additional Comments: pt reports no falls  Cognition        Strength RLE  Comment: grossly 3+/5  Strength LLE  Comment: grossly 3+/5  Motor Control  Gross Motor?: Exceptions  Comments: tremors in B UE's     Bed mobility  Supine to Sit: Contact guard assistance (HOB elevated)  Sit to Supine:  (pt left up in chair with needs in reach)  Transfers  Sit to Stand: Minimal Assistance  Stand to sit: Minimal Assistance  Bed to Chair: Minimal assistance  Ambulation  Ambulation?: Yes  More Ambulation?: Yes  Ambulation 1  Surface: level tile  Device: Rolling Walker  Assistance: Minimal assistance  Quality of Gait: decreased roberto, decreased step length and step height  Distance: 10 ft  Ambulation 2  Surface - 2: level tile  Device 2: No device  Assistance 2: Moderate assistance  Quality of Gait 2: increased unsteadiness, reaching for furniture in room,  Comments: pt stated she just recieved a Rw at home however typically doesn't use one at baseline. Discussed with pt that she should be ulitizing RW at all times at home     Balance  Sitting - Static: Good  Sitting - Dynamic: Fair;+  Standing - Static: Fair;-  Standing - Dynamic: Poor        Plan   Plan  Times per week: 3-5 X/ week  Times per day: Daily  Plan weeks: one week  Current Treatment Recommendations: Strengthening, Neuromuscular Re-education, Home Exercise Program, Equipment Evaluation, Education, & procurement, Transfer Training, Gait Training, Safety Education & Training, Balance Training, Stair training, Positioning, Patient/Caregiver Education & Training, Endurance Training, Cognitive Reorientation  Safety Devices  Type of devices:  All fall risk precautions in place, Chair alarm in place, Left in bed, Nurse notified, Gait belt, Left in chair, Call light within reach, Patient at risk for falls      AM-PAC Score  AM-PAC Inpatient Mobility Raw Score : 16 (06/08/21 1441)  AM-PAC Inpatient T-Scale Score : 40.78 (06/08/21 1441)  Mobility Inpatient CMS 0-100% Score: 54.16 (06/08/21 1441)  Mobility Inpatient CMS G-Code Modifier : CK (06/08/21 1441)          Goals  Short term goals  Time Frame for Short term goals: Goals to be met by 06/15/21 unless otherwise noted  Short term goal 1: Bed mobility: supervision  Short term goal 2: sit to/from stand to RW: supervision  Short term goal 3: Gait X 50 ft with RW Supervision  Short term goal 4: up an down 4 steps with 1 HR CGA  Short term goal 5: Tolerate 10-12 reps of LE exercises with min cues - to be met by 06/10/21  Patient Goals   Patient goals : \" to go home. \"       Therapy Time   Individual Concurrent Group Co-treatment   Time In 1300         Time Out 1334         Minutes 34         Timed Code Treatment Minutes: 24 Minutes + 10 min Eval     If pt is unable to be seen after this session, please let this note serve as discharge summary. Please see case management note for discharge disposition. Thank you.     Dannielle Espitia, PT, DPT

## 2021-06-08 NOTE — PROGRESS NOTES
removal with implant (Left, 01/08/2018); Intracapsular cataract extraction (Right, 1/28/2019); and Upper gastrointestinal endoscopy (N/A, 6/1/2021).            Restrictions  Restrictions/Precautions  Restrictions/Precautions: Fall Risk  Position Activity Restriction  Other position/activity restrictions: IV, up with assist    Subjective   General  Chart Reviewed: Yes  Patient assessed for rehabilitation services?: Yes  Family / Caregiver Present: No  Referring Practitioner: Mary Nj MD  Diagnosis: L LE numbness and ARF  Subjective  Subjective: Pt supine, agreeable  General Comment  Comments: RN clears for therapy  Patient Currently in Pain: Denies  Pain Assessment  Pain Assessment: 0-10  Pain Level: 0  Pre Treatment Pain Screening  Intervention List: Patient able to continue with treatment  Vital Signs  Pulse: 64  Heart Rate Source: Monitor  Resp: 16  BP: 115/67  BP Location: Right upper arm  MAP (mmHg): 83  Patient Position: Semi fowlers  Level of Consciousness: Alert (0)  Patient Currently in Pain: Denies  Oxygen Therapy  SpO2: 92 %  Pulse Oximeter Device Mode: Intermittent  Pulse Oximeter Device Location: Finger  O2 Device: None (Room air)  Social/Functional History  Social/Functional History  Lives With: Son  Type of Home: House  Home Layout: One level  Home Access: Stairs to enter with rails  Entrance Stairs - Number of Steps: 4  Entrance Stairs - Rails: Right (or left)  Bathroom Shower/Tub: Walk-in shower  Bathroom Toilet: Standard  Bathroom Equipment: Shower chair  Home Equipment: Rolling walker  ADL Assistance: Independent  Homemaking Assistance: Needs assistance (pt assist wtih laundry at times)  Ambulation Assistance: Independent  Transfer Assistance: Independent  Active : No  Patient's  Info: son  Occupation: Retired  Additional Comments: pt reports no falls       Objective        Orientation  Overall Orientation Status: Impaired  Orientation Level: Oriented to person;Oriented to place Balance  Sitting Balance: Supervision  Standing Balance: Moderate assistance (CGA/min with RW, mod A w/o AD)  Standing Balance  Time: 2-3 min x 2  Activity: functional mobility in room, standing at sink from grooming tasks  Functional Mobility  Functional - Mobility Device: Rolling Walker  Activity: To/from bathroom  Assist Level: Moderate assistance  Functional Mobility Comments: CGA/min A with RW, mod A w/o AD  Toilet Transfers  Toilet - Technique: Ambulating  Equipment Used: Standard toilet (L grab bar)  Toilet Transfer: Contact guard assistance  ADL  Feeding: Setup  Grooming: Contact guard assistance  LE Dressing: Minimal assistance  Toileting: Contact guard assistance  Additional Comments: pt limited by B UE tremors  Tone RUE  RUE Tone: Normotonic  Tone LUE  LUE Tone: Normotonic  Coordination  Movements Are Fluid And Coordinated: No  Coordination and Movement description: Fine motor impairments;Gross motor impairments;Tremors; Intention tremors; Left UE;Right UE  Quality of Movement Other  Comment: pt with Parkinson's        Transfers  Sit to stand: Contact guard assistance  Stand to sit: Contact guard assistance     Cognition  Overall Cognitive Status: Exceptions  Arousal/Alertness: Delayed responses to stimuli  Following Commands: Follows one step commands with increased time; Follows one step commands with repetition  Attention Span: Attends with cues to redirect  Memory: Decreased short term memory  Safety Judgement: Decreased awareness of need for assistance  Problem Solving: Decreased awareness of errors  Insights: Decreased awareness of deficits  Initiation: Requires cues for some  Sequencing: Requires cues for some  Cognition Comment: pt with dealyed and requires increased time to complete all tasks d/t Parkinson's        Sensation  Overall Sensation Status: Impaired (reports LEs are tingly)        LUE AROM (degrees)  LUE AROM : WFL  Left Hand AROM (degrees)  Left Hand AROM: WFL  RUE AROM (degrees)  RUE

## 2021-06-08 NOTE — H&P
(PROTONIX) 40 MG tablet Take 1 tablet by mouth every morning (before breakfast) 6/6/21   Krissy Puneet Older, APRN - CNP   amLODIPine (NORVASC) 10 MG tablet Take 1 tablet by mouth daily 5/31/21   Romana Rodriguez MD   carbidopa-levodopa (SINEMET)  MG per tablet Take 1 tablet by mouth 2 times daily 5/30/21   Romana Rodriguez MD   primidone (MYSOLINE) 50 MG tablet Take 2 tablets by mouth 2 times daily  Patient taking differently: Take 50 mg by mouth nightly  11/22/16   Elayne Mirza MD   citalopram (CELEXA) 20 MG tablet Take 20 mg by mouth daily. Historical Provider, MD   aspirin (ASPIRIN CHILDRENS) 81 MG chewable tablet Take 1 tablet by mouth daily. 5/9/14   Lenard Daly MD   atenolol (TENORMIN) 25 MG tablet Take 25 mg by mouth 2 times daily. Historical Provider, MD   simvastatin (ZOCOR) 20 MG tablet Take 20 mg by mouth nightly. Historical Provider, MD       Allergies:  Patient has no known allergies. Social History:          TOBACCO:   reports that she has never smoked. She has never used smokeless tobacco.  ETOH:   reports no history of alcohol use.   E-Cigarettes/Vaping Use     Questions Responses    E-Cigarette/Vaping Use Never User    Start Date     Passive Exposure     Quit Date     Counseling Given     Comments             Family History:      Reviewed in detail         Problem Relation Age of Onset    Heart Disease Mother     Diabetes Father        REVIEW OF SYSTEMS:   Limited accuracy  Constitutional:  No Fever, No Chills, No Night Sweats  ENT/Mouth:  No Nasal Congestion,  No Hoarseness, No new mouth lesion  Eyes:  No Eye Pain, No Redness, No Discharge  Cardiovascular:  No Chest Pain, No Orthopnea, No Palpitations  Respiratory:  No Cough, No Sputum, No Dyspnea  Gastrointestinal: No Vomiting, No Diarrhea, No abdominal pain  Genitourinary: No Urinary Frequency, No Hematuria, No Urinary pain  Musculoskeletal:  No worsening Arthralgias, No worsening Myalgias  Skin:  No new Skin Lesions, No new skin rash  Neuro:  No new weakness, No new numbness. Psych:  No suicial ideation, No Violence ideation    PHYSICAL EXAM PERFORMED:    BP 97/61   Pulse 65   Temp 98.2 °F (36.8 °C) (Axillary)   Resp 16   Ht 5' 4\" (1.626 m)   Wt 131 lb 13.4 oz (59.8 kg)   SpO2 93%   BMI 22.63 kg/m²     General appearance:  mild acute distress, appears older than stated age  HEENT:   atraumatic, sclera anicteric, Conjunctivae clear. Neck: Supple,Trachea midline, no goiter  Respiratory:minimal accessory muscle usage, Normal respiratory effort. Clear to auscultation, bilaterally without wheezing  Cardiovascular:  Regular rate and rhythm, capillary refill 2 seconds  Abdomen: Soft, non-tender, non-distended with normal bowel sounds. CVA tenderness present  Musculoskeletal:  No clubbing, cyanosis. trace edema LE bilaterally. Skin: turgor normal.  No new rashes or lesions. Neurologic: Alert and oriented x4, no new focal sensory/motor deficits. Labs:     Recent Labs     06/05/21  0632 06/07/21  1320   WBC 7.3 18.3*   HGB 8.5* 8.9*   HCT 25.5* 27.5*    225     Recent Labs     06/05/21  0632 06/07/21  1320    138   K 3.8 4.1    100   CO2 26 24   BUN 32* 51*   CREATININE 2.2* 4.5*   CALCIUM 8.6 8.7     Recent Labs     06/07/21  1320   AST 16   ALT 12   BILITOT <0.2   ALKPHOS 77     No results for input(s): INR in the last 72 hours.   Recent Labs     06/07/21  1320   TROPONINI 0.04*       Urinalysis:      Lab Results   Component Value Date    NITRU Negative 06/07/2021    WBCUA >100 06/07/2021    BACTERIA 2+ 06/07/2021    RBCUA see below 06/07/2021    BLOODU MODERATE 06/07/2021    SPECGRAV 1.020 06/07/2021    GLUCOSEU Negative 06/07/2021       Radiology:     CXR: I have reviewed the CXR with the following interpretation:   Atelectasis  EKG:  I have reviewed the EKG with the following interpretation:   Normal sinus rhythm    CT ABDOMEN PELVIS WO CONTRAST Additional Contrast? None   Final Result Prominent appearance of both kidneys with perinephric stranding of uncertain   significance. Acute bilateral bacterial nephritis with seem unlikely. Collapsed appearance of majority of the colon without acute abnormality. Very small nonobstructing left renal calculus. Small to moderate right   pleural effusion with mild right basilar atelectasis. CT Head WO Contrast   Final Result   No acute intracranial abnormality. Slight cerebral atrophy appropriate for age. Large amount of chronic   ischemic change also age-appropriate. No significant change from the prior   study. XR CHEST PORTABLE   Final Result   Minimal bibasilar airspace disease likely represents atelectasis, pneumonia   is difficult to completely exclude.              ASSESSMENT AND PLAN:    Active Hospital Problems    Diagnosis Date Noted    Pyelonephritis, acute [N10] 06/07/2021     Sepsis secondary to acute pyelonephritis:  Blood cultures, urine culture pending  Continue to monitor    Acute pyelonephritis:  Broad-spectrum antibiotic  Linezolid and Zosyn  Neurology consulted    Acute kidney disease:  Pre-renal  Nephrology consulted    Chronic medical conditions:  Essential Hypertension: Continue home medication  Hyperlipidemia: Continue home medication  GERD: Continue home medication  PD: Continued home medication  Depression: Held SSRI in the setting of patient receiving linezolid    Diet: Renal diet    DVT Prophylaxis: held    Dispo:   Expected LOS greater than two [facility]

## 2021-06-08 NOTE — FLOWSHEET NOTE
This note also relates to the following rows which could not be included:  Pulse - Cannot attach notes to unvalidated device data       06/08/21 0031   Assessment   Charting Type Admission   Neurological   Neuro (WDL) X   Level of Consciousness Alert (0)   Orientation Level Oriented to person;Oriented to place; Disoriented to time;Disoriented to situation   Cognition Follows commands   [place] Coma Scale   Eye Opening 4   Best Verbal Response 5   Best Motor Response 6   [place] Coma Scale Score 15   HEENT   HEENT (WDL) X   Right Eye Impaired vision   Left Eye Impaired vision   Right Ear Impaired hearing  (hearing aids)   Left Ear Impaired hearing   Respiratory   Respiratory (WDL) X   Respiratory Pattern Regular   Respiratory Depth Normal   Respiratory Quality/Effort Unlabored   Chest Assessment Chest expansion symmetrical   L Breath Sounds Clear;Diminished   R Breath Sounds Clear;Diminished   Breath Sounds   Right Upper Lobe Clear;Diminished   Right Middle Lobe Diminished   Right Lower Lobe Diminished   Left Upper Lobe Clear;Diminished   Left Lower Lobe Diminished   Cardiac   Cardiac (WDL) WDL   Cardiac Regularity Regular   Heart Sounds S1, S2   Cardiac Rhythm NSR   Gastrointestinal   Abdominal (WDL) WDL   RUQ Bowel Sounds Active   LUQ Bowel Sounds Active   RLQ Bowel Sounds Active   LLQ Bowel Sounds Active   Abdomen Inspection Soft   Tenderness Soft;Nontender   Peripheral Vascular   Peripheral Vascular (WDL) WDL   Edema None   RLE Neurovascular Assessment   Capillary Refill Less than/equal to 3 seconds   Color Appropriate for ethnicity   Temperature Warm   LLE Neurovascular Assessment   Capillary Refill Less than/equal to 3 seconds   Color Appropriate for ethnicity   Temperature Warm   Skin Color/Condition   Skin Color/Condition (WDL) X   Skin Color Appropriate for ethnicity   Skin Condition/Temp Warm;Poor turgor;Dry   Skin Integrity   Skin Integrity (WDL) X   Skin Integrity Abrasion   Location scattered Musculoskeletal   Musculoskeletal (WDL) X   RUE Full movement   LUE Full movement   RL Extremity Weakness   LL Extremity Weakness   Genitourinary   Genitourinary (WDL) X  (purewick)   Flank Tenderness No   Suprapubic Tenderness No   Dysuria No   Anus/Rectum   Anus/Rectum (WDL) WDL   Psychosocial   Psychosocial (WDL) X   Patient Behaviors Withdrawn;Flat affect

## 2021-06-08 NOTE — ED NOTES
Pt iv in left ac d/c dt infiltrate pt fluids going in left ac pump started sounding alarm. This RN back from lunch and assess site pt with a egg size infiltrate right ac. Pt with pressure dressing and warm pack placed on site. Pt state no Pain at site. Will cont to monitor site. Pt with IV started in right upper outer arm. Will cont to monitor.      Yovani Hill RN  06/07/21 4900

## 2021-06-08 NOTE — FLOWSHEET NOTE
06/08/21 0812   Assessment   Charting Type Shift assessment   Neurological   Neuro (WDL) X   Level of Consciousness Alert (0)   Orientation Level Oriented to person;Oriented to place; Disoriented to time;Disoriented to situation   Cognition Follows commands   Language Delayed responses   [place] Coma Scale   Eye Opening 4   Best Verbal Response 5   Best Motor Response 6   [place] Coma Scale Score 15   NIHSS Stroke Scale   NIHSS Stroke Scale Assessed No   HEENT   HEENT (WDL) X  (Pt VERY [place])   Right Eye Impaired vision   Left Eye Impaired vision   Right Ear Impaired hearing  (hearing aids)   Left Ear Impaired hearing   Respiratory   Respiratory (WDL) X   Respiratory Pattern Regular   Respiratory Depth Normal   Respiratory Quality/Effort Unlabored   Chest Assessment Chest expansion symmetrical   L Breath Sounds Clear;Diminished   R Breath Sounds Clear;Diminished   Breath Sounds   Right Upper Lobe Clear;Diminished   Right Middle Lobe Diminished   Right Lower Lobe Diminished   Left Upper Lobe Clear;Diminished   Left Lower Lobe Diminished   Cardiac   Cardiac (WDL) WDL   Cardiac Regularity Regular   Heart Sounds S1, S2   Cardiac Rhythm NSR   Rhythm Interpretation   Pulse 69   Cardiac Monitor   Telemetry Monitor On Yes   Telemetry Audible Yes   Telemetry Alarms Set Yes   Gastrointestinal   Abdominal (WDL) WDL   RUQ Bowel Sounds Active   LUQ Bowel Sounds Active   RLQ Bowel Sounds Active   LLQ Bowel Sounds Active   Abdomen Inspection Soft   Last BM (including prior to admit) 06/08/21   Tenderness Soft;Nontender   Peripheral Vascular   Peripheral Vascular (WDL) WDL   Edema None   RLE Neurovascular Assessment   Capillary Refill Less than/equal to 3 seconds   Color Appropriate for ethnicity   Temperature Warm   R Post Tibial Pulse +1   R Pedal Pulse +1   LLE Neurovascular Assessment   Capillary Refill Less than/equal to 3 seconds   Color Appropriate for ethnicity   Temperature Warm   L Post Tibial Pulse +1   L Pedal Pulse +1 Skin Color/Condition   Skin Color/Condition (WDL) X   Skin Color Pale   Skin Condition/Temp Flaky;Dry;Poor turgor   Skin Integrity   Skin Integrity (WDL) X   Skin Integrity Bruising; Abrasion   Location scattered   Multiple Skin Integrity Sites Yes   Skin Integrity Site 2   Skin Integrity Location 2 Redness   Location 2 buttocks   Preventative Dressing No   Assessed this shift? Yes   Skin Integrity Site 3   Skin Integrity Location 3 Abrasion    Location 3 lower back   Assessed this shift?  Yes   Musculoskeletal   Musculoskeletal (WDL) X   RUE Full movement   LUE Full movement   RL Extremity Weakness   LL Extremity Weakness   Genitourinary   Genitourinary (WDL) X  (purewick)   Flank Tenderness No   Suprapubic Tenderness No   Dysuria No   Urine Frequency   Urine Frequency Yes   Urine Urgency   Urine Urgency Yes   Urine Assessment   Incontinence No   Urine Color Yellow/straw   Urine Appearance Clear   Urine Odor Malodorous   Anus/Rectum   Anus/Rectum (WDL) WDL   Psychosocial   Psychosocial (WDL) X   Patient Behaviors Withdrawn;Flat affect

## 2021-06-09 VITALS
WEIGHT: 136.24 LBS | DIASTOLIC BLOOD PRESSURE: 54 MMHG | HEIGHT: 64 IN | HEART RATE: 62 BPM | RESPIRATION RATE: 16 BRPM | SYSTOLIC BLOOD PRESSURE: 120 MMHG | OXYGEN SATURATION: 96 % | BODY MASS INDEX: 23.26 KG/M2 | TEMPERATURE: 97.8 F

## 2021-06-09 LAB
A/G RATIO: 0.7 (ref 1.1–2.2)
ALBUMIN SERPL-MCNC: 2.5 G/DL (ref 3.4–5)
ALP BLD-CCNC: 62 U/L (ref 40–129)
ALT SERPL-CCNC: <5 U/L (ref 10–40)
ANION GAP SERPL CALCULATED.3IONS-SCNC: 12 MMOL/L (ref 3–16)
ANION GAP SERPL CALCULATED.3IONS-SCNC: 13 MMOL/L (ref 3–16)
AST SERPL-CCNC: 11 U/L (ref 15–37)
BASOPHILS ABSOLUTE: 0.1 K/UL (ref 0–0.2)
BASOPHILS RELATIVE PERCENT: 0.7 %
BILIRUB SERPL-MCNC: <0.2 MG/DL (ref 0–1)
BUN BLDV-MCNC: 59 MG/DL (ref 7–20)
BUN BLDV-MCNC: 61 MG/DL (ref 7–20)
CALCIUM SERPL-MCNC: 8 MG/DL (ref 8.3–10.6)
CALCIUM SERPL-MCNC: 8.2 MG/DL (ref 8.3–10.6)
CHLORIDE BLD-SCNC: 100 MMOL/L (ref 99–110)
CHLORIDE BLD-SCNC: 102 MMOL/L (ref 99–110)
CO2: 20 MMOL/L (ref 21–32)
CO2: 20 MMOL/L (ref 21–32)
CREAT SERPL-MCNC: 5.9 MG/DL (ref 0.6–1.2)
CREAT SERPL-MCNC: 6.1 MG/DL (ref 0.6–1.2)
EOSINOPHILS ABSOLUTE: 0.4 K/UL (ref 0–0.6)
EOSINOPHILS RELATIVE PERCENT: 4.3 %
GFR AFRICAN AMERICAN: 8
GFR AFRICAN AMERICAN: 8
GFR NON-AFRICAN AMERICAN: 6
GFR NON-AFRICAN AMERICAN: 7
GLOBULIN: 3.5 G/DL
GLUCOSE BLD-MCNC: 86 MG/DL (ref 70–99)
GLUCOSE BLD-MCNC: 87 MG/DL (ref 70–99)
HCT VFR BLD CALC: 24.2 % (ref 36–48)
HCT VFR BLD CALC: 24.3 % (ref 36–48)
HEMOGLOBIN: 7.9 G/DL (ref 12–16)
HEMOGLOBIN: 8.1 G/DL (ref 12–16)
LYMPHOCYTES ABSOLUTE: 0.7 K/UL (ref 1–5.1)
LYMPHOCYTES RELATIVE PERCENT: 7.7 %
MCH RBC QN AUTO: 25.7 PG (ref 26–34)
MCH RBC QN AUTO: 26.2 PG (ref 26–34)
MCHC RBC AUTO-ENTMCNC: 32.6 G/DL (ref 31–36)
MCHC RBC AUTO-ENTMCNC: 33.4 G/DL (ref 31–36)
MCV RBC AUTO: 78.2 FL (ref 80–100)
MCV RBC AUTO: 78.8 FL (ref 80–100)
MONOCYTES ABSOLUTE: 0.6 K/UL (ref 0–1.3)
MONOCYTES RELATIVE PERCENT: 6.7 %
NEUTROPHILS ABSOLUTE: 7.4 K/UL (ref 1.7–7.7)
NEUTROPHILS RELATIVE PERCENT: 80.6 %
PDW BLD-RTO: 15.3 % (ref 12.4–15.4)
PDW BLD-RTO: 15.7 % (ref 12.4–15.4)
PLATELET # BLD: 218 K/UL (ref 135–450)
PLATELET # BLD: 239 K/UL (ref 135–450)
PMV BLD AUTO: 7.4 FL (ref 5–10.5)
PMV BLD AUTO: 7.6 FL (ref 5–10.5)
POTASSIUM REFLEX MAGNESIUM: 3.8 MMOL/L (ref 3.5–5.1)
POTASSIUM SERPL-SCNC: 3.8 MMOL/L (ref 3.5–5.1)
RBC # BLD: 3.08 M/UL (ref 4–5.2)
RBC # BLD: 3.1 M/UL (ref 4–5.2)
SODIUM BLD-SCNC: 133 MMOL/L (ref 136–145)
SODIUM BLD-SCNC: 134 MMOL/L (ref 136–145)
TOTAL PROTEIN: 6 G/DL (ref 6.4–8.2)
WBC # BLD: 9.1 K/UL (ref 4–11)
WBC # BLD: 9.3 K/UL (ref 4–11)

## 2021-06-09 PROCEDURE — 85025 COMPLETE CBC W/AUTO DIFF WBC: CPT

## 2021-06-09 PROCEDURE — 6370000000 HC RX 637 (ALT 250 FOR IP): Performed by: INTERNAL MEDICINE

## 2021-06-09 PROCEDURE — 85027 COMPLETE CBC AUTOMATED: CPT

## 2021-06-09 PROCEDURE — 6360000002 HC RX W HCPCS: Performed by: INTERNAL MEDICINE

## 2021-06-09 PROCEDURE — 36415 COLL VENOUS BLD VENIPUNCTURE: CPT

## 2021-06-09 PROCEDURE — 80053 COMPREHEN METABOLIC PANEL: CPT

## 2021-06-09 PROCEDURE — 2580000003 HC RX 258: Performed by: INTERNAL MEDICINE

## 2021-06-09 RX ADMIN — PIPERACILLIN AND TAZOBACTAM 3375 MG: 3; .375 INJECTION, POWDER, LYOPHILIZED, FOR SOLUTION INTRAVENOUS at 08:44

## 2021-06-09 RX ADMIN — CARBIDOPA AND LEVODOPA 1 TABLET: 10; 100 TABLET ORAL at 08:43

## 2021-06-09 RX ADMIN — LINEZOLID 600 MG: 600 INJECTION, SOLUTION INTRAVENOUS at 08:44

## 2021-06-09 RX ADMIN — ASPIRIN 81 MG: 81 TABLET, CHEWABLE ORAL at 08:43

## 2021-06-09 RX ADMIN — SODIUM CHLORIDE, PRESERVATIVE FREE 10 ML: 5 INJECTION INTRAVENOUS at 08:44

## 2021-06-09 RX ADMIN — HEPARIN SODIUM 5000 UNITS: 5000 INJECTION INTRAVENOUS; SUBCUTANEOUS at 05:19

## 2021-06-09 RX ADMIN — PANTOPRAZOLE SODIUM 40 MG: 40 TABLET, DELAYED RELEASE ORAL at 05:19

## 2021-06-09 ASSESSMENT — PAIN SCALES - GENERAL: PAINLEVEL_OUTOF10: 0

## 2021-06-09 NOTE — DISCHARGE SUMMARY
Hospital Medicine Discharge Summary    Patient ID: Barbara Pedraza      Patient's PCP: Glenny Sullivan MD    Admit Date: 6/7/2021     Discharge Date:   06/09/21    Admitting Physician: Clifford Hemphill DO     Discharge Physician: Noah Moscoso MD     Discharge Diagnoses: Active Hospital Problems    Diagnosis     Pyelonephritis, acute [N10]        The patient was seen and examined on day of discharge and this discharge summary is in conjunction with any daily progress note from day of discharge. Hospital Course:   80 y.o. female who presented to [facility] with past medical history of asthma, arthritis, hypertension, hyperlipidemia, PAD presented to the ED for numbness in lower extremity left occurring for 48 hours. History is limited due to patient encephalopathic  Patient has been having decreased urine output with weakness in his feet able to get around which is very abnormal for him.  Patient normally is oriented x3 but has also decreased in orientation. Pyelonephritis  - no evidence of sepsis at this time  - started on linezolid, zosyn  - blood, urine cultures NGTD  - will not continue abx on discharge due to hospice care     Leukocytosis  - 2/2 above  - improved     JOAQUIN on CKD  - 2/2 above  - marked elevation since admission  - nephrology consulted  - no plan for HD. Comfort measures only     HTN  - well controlled  - stopped home regimen     HLD  - stopped statin     GERD  - on PPI     Parkinson disease  - continue home sinemet     Depression  - mood stable  - continue home celexa    Physical Exam Performed:     BP (!) 120/54   Pulse 62   Temp 97.8 °F (36.6 °C) (Oral)   Resp 16   Ht 5' 4\" (1.626 m)   Wt 136 lb 3.9 oz (61.8 kg)   SpO2 96%   BMI 23.39 kg/m²       General appearance:  mild acute distress, appears older than stated age  HEENT:   atraumatic, sclera anicteric, Conjunctivae clear.   Neck: Supple,Trachea midline, no goiter  Respiratory:minimal accessory muscle usage, Normal respiratory effort. Clear to auscultation, bilaterally without wheezing  Cardiovascular:  Regular rate and rhythm, capillary refill 2 seconds  Abdomen: Soft, non-tender, non-distended with normal bowel sounds.  CVA tenderness present  Musculoskeletal:  No clubbing, cyanosis. trace edema LE bilaterally. Skin: turgor normal.  No new rashes or lesions. Neurologic: Alert and oriented x4, no new focal sensory/motor deficits. Labs: For convenience and continuity at follow-up the following most recent labs are provided:      CBC:    Lab Results   Component Value Date    WBC 9.3 06/09/2021    HGB 8.1 06/09/2021    HCT 24.2 06/09/2021     06/09/2021       Renal:    Lab Results   Component Value Date     06/09/2021    K 3.8 06/09/2021    K 3.8 06/09/2021     06/09/2021    CO2 20 06/09/2021    BUN 59 06/09/2021    CREATININE 6.1 06/09/2021    CALCIUM 8.2 06/09/2021         Significant Diagnostic Studies    Radiology:   CT ABDOMEN PELVIS WO CONTRAST Additional Contrast? None   Final Result   Prominent appearance of both kidneys with perinephric stranding of uncertain   significance. Acute bilateral bacterial nephritis with seem unlikely. Collapsed appearance of majority of the colon without acute abnormality. Very small nonobstructing left renal calculus. Small to moderate right   pleural effusion with mild right basilar atelectasis. CT Head WO Contrast   Final Result   No acute intracranial abnormality. Slight cerebral atrophy appropriate for age. Large amount of chronic   ischemic change also age-appropriate. No significant change from the prior   study. XR CHEST PORTABLE   Final Result   Minimal bibasilar airspace disease likely represents atelectasis, pneumonia   is difficult to completely exclude.                 Consults:     IP CONSULT TO HOSPITALIST  IP CONSULT TO NEPHROLOGY  IP CONSULT TO HOSPICE    Disposition:  Hospice     Condition at Discharge: Terminal Discharge Instructions/Follow-up:  N/A    Code Status:  DNR-CC     Activity: activity as tolerated    Diet: regular diet      Discharge Medications:     Current Discharge Medication List           Details   pantoprazole (PROTONIX) 40 MG tablet Take 1 tablet by mouth every morning (before breakfast)  Qty: 30 tablet, Refills: 3      carbidopa-levodopa (SINEMET)  MG per tablet Take 1 tablet by mouth 2 times daily  Qty: 90 tablet, Refills: 3      primidone (MYSOLINE) 50 MG tablet Take 2 tablets by mouth 2 times daily  Qty: 360 tablet, Refills: 3    Associated Diagnoses: Essential and other specified forms of tremor      citalopram (CELEXA) 20 MG tablet Take 20 mg by mouth daily. Time Spent on discharge is more than 30 minutes in the examination, evaluation, counseling and review of medications and discharge plan. Signed:    Heidi Swanson MD   6/9/2021      Thank you Sindi Aldana MD for the opportunity to be involved in this patient's care. If you have any questions or concerns please feel free to contact me at [phone].

## 2021-06-09 NOTE — DISCHARGE INSTR - COC
Continuity of Care Form    Patient Name: Meg Wood   :  3/16/1932  MRN:  [id number]    Admit date:  2021  Discharge date:      Code Status Order: [facility]   Advance Directives:   [facility] Documentation     Date/Time Healthcare Directive Type of Healthcare Directive Copy in [address] Agent's Name Healthcare Agent's Phone Number    21 1559  No, patient does not have an advance directive for healthcare treatment -- -- -- -- --          Admitting Physician:  Zack Larose DO  PCP: Robson Bender MD    Discharging Nurse: [facility]/Room#: 1221/9591-63  Discharging Unit Phone Number: [phone]    Emergency Contact:   Extended Emergency Contact Information  Primary Emergency Contact: Marelycurt [address] Phone: [phone]  Relation: Child  Secondary Emergency Contact: [address]  Mobile Phone: [phone]  Relation: Child    Past Surgical History:  Past Surgical History:   Procedure Laterality Date    BACK SURGERY      CARPAL TUNNEL RELEASE Bilateral     CATARACT REMOVAL WITH IMPLANT Left 2018    CHOLECYSTECTOMY      HERNIA REPAIR      INTRACAPSULAR CATARACT EXTRACTION Right 2019    PHACOEMULSIFICATION OF CATARACT RIGHT EYE WITH INTRAOCULAR LENS IMPLANT --BRANDON=4-- performed by Kenney Seip, MD at [address] [facility]      and polyps from \"throat\"    UPPER GASTROINTESTINAL ENDOSCOPY N/A 2021    EGD BIOPSY performed by Ken Prince MD at [address] [place] [address]       Immunization History:   Immunization History   Administered Date(s) Administered    Tdap (Boostrix, Adacel) 2015       Active Problems:  Patient Active Problem List   Diagnosis Code    Essential and other specified forms of tremor G25.0, G25.2    Hereditary and idiopathic peripheral neuropathy G60.9    Abnormality of gait R26.9    Essential hypertension I10    Mixed hyperlipidemia E78.2  Dysthymia F34.1    Polyneuropathy G62.9    Essential hypertension I10    CAD in native artery I25.10    Recurrent falls R29.6    Acute urinary tract infection N39.0    Parkinson's disease ([facility] [address] [facility] G20    Anemia of chronic disease D63.8    Occult blood positive stool R19.5    Subepithelial lesion of stomach K31.9    Gastritis without bleeding K29.70    Hiatal hernia K44.9    Pyelonephritis, acute N10       Isolation/Infection:   Isolation          No Isolation        Patient Infection Status     None to display          Nurse Assessment:  Last Vital Signs: BP (!) 120/54   Pulse 62   Temp 97.8 °F (36.6 °C) (Oral)   Resp 16   Ht 5' 4\" (1.626 m)   Wt 136 lb 3.9 oz (61.8 kg)   SpO2 96%   BMI 23.39 kg/m²     Last documented pain score (0-10 scale): Pain Level: 0  Last Weight:   Wt Readings from Last 1 Encounters:   06/09/21 136 lb 3.9 oz (61.8 kg)     Mental Status:  oriented and alert    IV Access:  - Peripheral IV - site  R Antecubital, insertion date: 6/7/21    Nursing Mobility/ADLs:  Walking   Dependent  Transfer  Dependent  Bathing  Dependent  Dressing  Dependent  Toileting  Dependent  Feeding  Assisted  Med Admin  Assisted  Med Delivery   whole    Wound Care Documentation and Therapy:        Elimination:  Continence:   · Bowel: No  · Bladder: No  Urinary Catheter: None   Colostomy/Ileostomy/Ileal Conduit: No       Date of Last BM: 0      Intake/Output Summary (Last 24 hours) at 6/9/2021 1420  Last data filed at 6/9/2021 0533  Gross per 24 hour   Intake 240 ml   Output 0 ml   Net 240 ml     I/O last 3 completed shifts: In: 360 [P.O.:360]  Out: 0     Safety Concerns: At Risk for Falls    Impairments/Disabilities:      Hearing    Nutrition Therapy:  Current Nutrition Therapy:   - Oral Diet:  General    Routes of Feeding: Oral  Liquids:  Thin Liquids  Daily Fluid Restriction: no  Last Modified Barium Swallow with Video (Video Swallowing Test): not done    Treatments at the Time of Hospital Discharge:   Respiratory Treatments: ***  Oxygen Therapy:  is on oxygen at 2 L/min per nasal cannula. Ventilator:    - No ventilator support    Rehab Therapies: ***  Weight Bearing Status/Restrictions: [facility] Weight Bearin}  Other Medical Equipment (for information only, NOT a DME order):  {EQUIPMENT:773669739}  Other Treatments: ***    Patient's personal belongings (please select all that are sent with patient):  {P DME Belongings:516461207}    RN SIGNATURE:  Electronically signed by Gladys Nye RN on 21 at 2:24 PM EDT    CASE MANAGEMENT/SOCIAL WORK SECTION    Inpatient Status Date: ***    Readmission Risk Assessment Score:  Readmission Risk              Risk of Unplanned Readmission:  21           Discharging to Facility/ Agency   · Name:   · Address:  · Phone:  · Fax:    Dialysis Facility (if applicable)   · Name:  · Address:  · Dialysis Schedule:  · Phone:  · Fax:    / signature: {Esignature:509047673}    PHYSICIAN SECTION    Prognosis: {Prognosis:8711794956}    Condition at Discharge: [facility]ney [facility] Patient Condition:419702797}    Rehab Potential (if transferring to Rehab): {Prognosis:5082814810}    Recommended Labs or Other Treatments After Discharge: ***    Physician Certification: I certify the above information and transfer of Walter Holland  is necessary for the continuing treatment of the diagnosis listed and that she requires {Admit to Appropriate Level of Care:68592} for {GREATER/LESS:625808264} 30 days.      Update Admission H&P: {CHP DME Changes in DXOUJ:185178199}    PHYSICIAN SIGNATURE:  {Esignature:422649195}

## 2021-06-09 NOTE — ACP (ADVANCE CARE PLANNING)
ADVANCED CARE PLANNING    Name:Annabelle Madrigal       :  3/16/1932              MRN:  [id number]  Admission Date: 2021 12:59 PM  Date of Discussion:  21      Purpose of Encounter: Advanced care planning in light of JOAQUIN. Parties in attendance: :Byron Ayala MD, Family members: Son  Decisional Capacity:Yes      Objective/Medical Story: Patient admitted with sepsis related to a UTI and severe JOAQUIN on baseline CKD. Nephrology consulted but family elected for hospice care instead of dialysis. Active Diagnoses: Active Hospital Problems    Diagnosis Date Noted    Pyelonephritis, acute [N10] 2021       These active diagnoses are of sufficient risk that focused discussion on advance care planning is indicated in order to allow the patient to thoughtfully consider personal goals of care; and if situations arise that prevent the ability to personally give input, to ensure appropriate representation of their personal desires for different levels and agressiveness of care. Goals of Care Determinations: Patient wishes to focus on comfort. Plan for hospice on discharge  Code Status: At this time patient wishes to be DNR         Time Spent on Advanced Planning Documents: 16 mins. The following items were considered in medical decision making:  Independent review of images , Review / order clinical lab tests. Review / order radiology tests, Decision to obtain old records. Advanced Care Planning Documents:  Completed advances directives, advanced directives in chart. Electronically signed by Milena Headley MD on 2021 at 12:35 PM    Thank you Maulik Ambriz MD for the opportunity to be involved in this patient's care. If you have any questions or concerns please feel free to contact me at [phone].

## 2021-06-09 NOTE — PROGRESS NOTES
[facility]    [facility] [email or web address]              [phone]                      Interval History and plan:      Cr worse  Plan:  Plans for hospice  No changes today  Will sign off   Please call with issues                    Assessment :     JOAQUIN  Hypotension episodes  And NSAIDS  Also has M spike with nromal K/L ratio  Baseline cr was 0.76 on 4/21  4 on last admission  UA- pyuria  Pr/cr - 1.1 gm  USG- no hydronephrosis    Renal mass  2.7 cm kidney- right    Hypertension   BP: (116-120)/(54-57)  Pulse:  [62-64]   BP goal inpatient 591-660 systolic inpatient                [facility] would like to thank Loli Parikh MD   for opportunity to serve this patient      Please call with questions at-   24 Hrs Answering service [phone] or  7 am- 5 pm via Perfect serve or cell phone  Allan Maurice MD          CC/reason for consult :     JOAQUIN     HPI :     Skyler Pennington is a 80 y.o. female presented to   the hospital on 6/7/2021 with numbness of lower extremity. She is confused and unable to provide good detail. She is known to have Joaquin, was here last admission. Has has MGUS, and BM biopsy done. Her creatinine was high during that admission. Now cr is much higher than before. We are consulted for JOAQUIN and related issues. ROS:     Seen with- son     positives in bold   Constitutional:  fever, chills, weakness, weight change, fatigue  Skin:  rash, pruritus, hair loss, bruising, dry skin, petechiae  Head, Face, Neck   headaches, swelling,  cervical adenopathy  Respiratory: shortness of breath, cough, or wheezing  Cardiovascular: chest pain, palpitations, dizzy, edema  Gastrointestinal: nausea, vomiting, diarrhea, constipation,belly pain    Yellow skin, blood in stool  Musculoskeletal:  back pain, muscle weakness, gait problems,       joint pain or swelling.   Genitourinary:  dysuria, poor urine flow, flank pain, blood in urine  Neurologic:  vertigo, TIA'S, syncope, seizures, focal weakness  Psychosocial:  insomnia, anxiety, or depression. Additional positive findings:              All other remaining systems are negative or unable to obtain        PMH/PSH/SH/Family History:     Past Medical History:   Diagnosis Date    Arthritis     Asthma     past hx    Cancer ([address]     skin    Hyperlipidemia     Hypertension     Tremors of nervous system     Unspecified cerebral artery occlusion with cerebral infarction     X 2-        Past Surgical History:   Procedure Laterality Date    BACK SURGERY      CARPAL TUNNEL RELEASE Bilateral     CATARACT REMOVAL WITH IMPLANT Left 01/08/2018    CHOLECYSTECTOMY      HERNIA REPAIR      INTRACAPSULAR CATARACT EXTRACTION Right 1/28/2019    PHACOEMULSIFICATION OF CATARACT RIGHT EYE WITH INTRAOCULAR LENS IMPLANT --BRANDON=4-- performed by Manish Kim MD at [address]      and polyps from \"throat\"    UPPER GASTROINTESTINAL ENDOSCOPY N/A 6/1/2021    EGD BIOPSY performed by Kylee Richard MD at [address]        reports that she has never smoked. She has never used smokeless tobacco. She reports that she does not drink alcohol and does not use drugs. family history includes Diabetes in her father; Heart Disease in her mother.          Medication:     Current Facility-Administered Medications: sodium chloride flush 0.9 % injection 10 mL, 10 mL, Intravenous, 2 times per day  sodium chloride flush 0.9 % injection 10 mL, 10 mL, Intravenous, PRN  0.9 % sodium chloride infusion, 25 mL, Intravenous, PRN  promethazine (PHENERGAN) tablet 12.5 mg, 12.5 mg, Oral, Q6H PRN **OR** ondansetron (ZOFRAN) injection 4 mg, 4 mg, Intravenous, Q6H PRN  acetaminophen (TYLENOL) tablet 650 mg, 650 mg, Oral, Q6H PRN **OR** acetaminophen (TYLENOL) suppository 650 mg, 650 mg, Rectal, Q6H PRN  aspirin chewable tablet 81 mg, 81 mg, Oral, Daily  carbidopa-levodopa (SINEMET)  MG per tablet 1 tablet, 1 tablet, Oral, BID  primidone (MYSOLINE)

## 2021-06-09 NOTE — PROGRESS NOTES
[facility]    Met with patient and family to discuss hospice philosophy and services. They are agreeable to hospice and plan is: [facility] ipu      Transport set up at [facility] with [facility] ambulance  Recommendations:  Please have MD sign [facility] which is located in patient's soft chart  Please have MD place discharge order and complete the discharge task per hospital procedure    Updated staff nurse on plan. Notified ROMAN Davison. Thank you for the opportunity to serve this patient and family. Serge Alvarado RN, [address], [address] [place] [address]   O: [phone]  C: [phone]  F: [phone]   Main & Referrals: [phone]   [facility]

## 2021-06-09 NOTE — PROGRESS NOTES
Occupational Therapy  OT follow up attempted this date. Son and Granddaughter present. Therapy service offered. Pt had received lunch tray. Pt family stated Sejal Hardwick is hospice again. Take the skills to someone else. \" Pt family requested to remove pt from list.     Veronica Ruano, [address] [facility]

## 2021-06-11 LAB
BLOOD CULTURE, ROUTINE: NORMAL
CULTURE, BLOOD 2: NORMAL

## 2021-06-11 NOTE — PROGRESS NOTES
Physician Progress Note      PATIENT:               Erwin Sampson  CSN #:                  [id number]  :                       3/16/1932  ADMIT DATE:       2021 4:06 AM  DISCH DATE:        2021 4:02 PM  RESPONDING  PROVIDER #:        Peterson Jose APRN - ALANNA          QUERY TEXT:    Patient admitted with possible sepsis and UTI. Documentation reflects   \"possible sepsis ruled out\". If possible, please document in the progress   notes and discharge summary if UTI was: The medical record reflects the following:  Risk Factors: 80 y.o. female, parkinsons  Clinical Indicators: notes with possible sepsis, UA trace blood moderate   leuks, cx \"mixed mechelle\"  Treatment: IV rocephin \"x 3 doses\", UA, micro, culture, supportive care    Thank you,  Janeane Councilman RN CDS  [phone]  Options provided:  -- UTI treated and resolved  -- UTI ruled out after study  -- Other - I will add my own diagnosis  -- Disagree - Not applicable / Not valid  -- Disagree - Clinically unable to determine / Unknown  -- Refer to Clinical Documentation Reviewer    PROVIDER RESPONSE TEXT:    UTI treated and resolved. Query created by: Jayshree Plaza on 2021 3:05 PM      QUERY TEXT:    Pt admitted with JOAQUIN. 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 If possible,   please document in progress notes and discharge summary the relationship, if   any, between JOAQUIN and monoclonal gammopathy:    The medical record reflects the following:  Risk Factors: monoclonal gammopathy  Clinical Indicators:  JOAQUIN  Treatment: nephrology consult, heme/onc consult, bone marrow biopsy, pt   declined kidney biopsy, IV fluids, serial labs, monitoring, supportive care    Thank you,  Janeane Councilman RN CDS  [phone]  Options provided:  -- JOAQUIN due to monoclonal gammopathy  -- JOAQUIN unrelated to monoclonal gammopathy  -- Other - I will add my own diagnosis  -- Disagree - Not applicable / Not valid  -- Disagree - Clinically unable to determine / Unknown  -- Refer to Clinical Documentation Reviewer    PROVIDER RESPONSE TEXT:    This patient has JOAQUIN due to monoclonal gammopathy. Query created by: Lisa Trinidad on 6/9/2021 3:04 PM      QUERY TEXT:    Patient admitted with JOAQUIN, noted to have \" Anemia Hemoccult positive\" and \"no   active bleeding\", EGD with \"mild gastritis\". If possible, please document in   progress notes and discharge summary if you are evaluating and/or treating any   of the following: The medical record reflects the following:  Risk Factors: 80 y.o. female w/ parkinsons  Clinical Indicators: GI note: \"GI is consulted for acute on chronic microcytic   anemia Hgb 10.4 g/dL on 5/25/21 with occult positive stool. Nadyne Davida Nadyne Lincoln 1. Acute on   chronic microcytic anemia with positive occult stool test in seting of acute   renal injury. Etiology of microcytic anemia multifactorial including but not   limited to anemia of chronic disease and other GI blood loss. \"  EGD findings   \"mild gastritis\" without active bleeding at time of procedure    hgb on arrival 10.4 ->9.6 ->8. 5    Treatment: PPI, GI consult, EGD, serial labs, monitoring, supportive care    Thank you,  Ramy Sena RN [facility]  [phone]  Options provided:  -- mild gastritis with bleeding that has resolved  -- FOBT+ suspected false positive  -- Other - I will add my own diagnosis  -- Disagree - Not applicable / Not valid  -- Disagree - Clinically unable to determine / Unknown  -- Refer to Clinical Documentation Reviewer    PROVIDER RESPONSE TEXT:    This patient has mild gastritis with bleeding that has resolved. Query created by:  Lisa Trinidad on 6/9/2021 3:05 PM      Electronically signed by:  Justina Babb CNP 6/11/2021 10:07 AM

## 2021-06-21 NOTE — PROGRESS NOTES
Physician Progress Note      PATIENT:               Madai Sheridan  CSN #:                  [id number]  :                       3/16/1932  ADMIT DATE:       2021 12:59 PM  [address] DATE:        2021 2:38 PM  RESPONDING  PROVIDER #:        Yoli Roberston MD          QUERY TEXT:    H&P notes-  Sepsis secondary to acute pyelonephritis\"  PN  and discharge   summary notes- Pyelonephritis  - no evidence of sepsis at this time\"  If possible, please document in progress notes and discharge summary if you   are evaluating and /or treating any of the following: The medical record reflects the following:  Risk Factors: pyelonephritis carola on ckd, encephalopathy  Clinical Indicators: WBC 18.3, Neutrophils-16.7, Procalcitonin-80.43, Acute   pyelonephritis, metabolic  encephalopathy, HR-81, Temp 99  OA. D/C-\"blood,   urine cultures NGTD- will not continue abx on discharge due to hospice   care,Leukocytosis- 2/2 above- improved  Treatment: linezolid, zosyn- blood, urine cultures, serial labs, Procalcitonin   level, supportive care, hospice    Thank-You  Yesi Manzano RN, BSN, CCDS  Options provided:  -- Sepsis confirmed present on arrival and resolved  -- After study sepsis ruled out.  -- Other - I will add my own diagnosis  -- Disagree - Not applicable / Not valid  -- Disagree - Clinically unable to determine / Unknown  -- Refer to Clinical Documentation Reviewer    PROVIDER RESPONSE TEXT:    After study, sepsis was ruled out.     Query created by: Rexine Bosworth on 6/15/2021 12:54 PM      Electronically signed by:  Yoli Robertson MD 2021 7:21 AM

## 2021-06-22 LAB
Lab: NORMAL
Lab: NORMAL
REPORT: NORMAL
REPORT: NORMAL
THIS TEST SENT TO: NORMAL
THIS TEST SENT TO: NORMAL

## 2023-10-10 NOTE — PLAN OF CARE
Outpatient Care Management  Initial Patient Assessment    Patient: Trudy R Dakin  MRN: [id number]  Date of Service: 10/10/2023  Completed by: Mary Jane Peralta RN  Referral Date: 10/03/2023  Program: No programs to display      Reason for Visit   Patient presents with    OPCM Enrollment Call    Nursing Assessment       Brief Summary:  Trudy R Dakin was referred by Dr. Cooper for AMS. Patient qualifies for program based on high risk score of 93%.   Active problem list, medical, surgical and social history reviewed. Active comorbidities include lumbar spinal stenosis, depression, CAD, T2DM, and chronic constipation. Areas of need identified by patient include memory issues and fall risk.   Set up mom's meals  Referred to  for assistance.         Disability Status  Is the patient alert and oriented (person, place, time, and situation)?: Alert and oriented x 4  Hearing Difficulty or Deaf: no  Visual Difficulty or Blind: yes  Visual and Hearing Needs Conclusion: -- (The patient wears glasses all of the time but denies any hearing issues.)  Difficulty Concentrating, Remembering or Making Decisions: yes  Communication Difficulty: no  Eating/Swallowing Difficulty: no  Walking or Climbing Stairs Difficulty: no  Walking or Climbing Stairs: ambulation difficulty, requires equipment  Mobility Management: walker with wheels  Dressing/Bathing Difficulty: no  Toileting : Independent  Continence : Continence - Not a problem  Difficulty Managing Errands Independently: yes  Equipment Currently Used at Home: glucometer; shower chair; wheelchair; rollator  ADL Conclusion Statement: -- (The patient is very independent with her ADL's)  Change in Functional Status Since Onset of Current Illness/Injury: yes (having issues with dizziness and twitches)        Spiritual Beliefs  Spiritual, Cultural Beliefs, Tenriism Practices, Values that Affect Care: no      Social History     Socioeconomic History    Marital status:    Tobacco Use     Problem: Falls - Risk of:  Goal: Will remain free from falls  Description: Will remain free from falls  6/9/2021 0105 by Shavonne Chan RN  Outcome: Ongoing  6/8/2021 1559 by Pasquale Hurley RN  Outcome: Ongoing  Goal: Absence of physical injury  Description: Absence of physical injury  6/9/2021 0105 by Shavonne Chan RN  Outcome: Ongoing  6/8/2021 1559 by Pasquale Hurley RN  Outcome: Ongoing     Problem: Skin Integrity:  Goal: Will show no infection signs and symptoms  Description: Will show no infection signs and symptoms  6/9/2021 0105 by Shavonne Chan RN  Outcome: Ongoing  6/8/2021 1559 by Pasquale Hurley RN  Outcome: Ongoing  Goal: Absence of new skin breakdown  Description: Absence of new skin breakdown  6/9/2021 0105 by Shavonne Chan RN  Outcome: Ongoing  6/8/2021 1559 by Pasquale Hurley RN  Outcome: Ongoing Smoking status: Never    Smokeless tobacco: Never   Substance and Sexual Activity    Alcohol use: No    Drug use: No    Sexual activity: Never     Social Determinants of Health     Financial Resource Strain: Low Risk  (10/2/2023)    Overall Financial Resource Strain (CARDIA)     Difficulty of Paying Living Expenses: Not hard at all   Food Insecurity: No Food Insecurity (10/2/2023)    Hunger Vital Sign     Worried About Running Out of Food in the Last Year: Never true     Ran Out of Food in the Last Year: Never true   Transportation Needs: No Transportation Needs (10/2/2023)    PRAPARE - Transportation     Lack of Transportation (Medical): No     Lack of Transportation (Non-Medical): No   Physical Activity: Inactive (10/2/2023)    Exercise Vital Sign     Days of Exercise per Week: 0 days     Minutes of Exercise per Session: 0 min   Stress: No Stress Concern Present (10/2/2023)    [employer] - Occupational Stress Questionnaire     Feeling of Stress : Only a little   Social Connections: Unknown (10/2/2023)    Social Connection and Isolation Panel [NHANES]     Frequency of Communication with Friends and Family: Twice a week     Attends Orthodox Services: 1 to 4 times per year     Active Member of Clubs or Organizations: No     Attends Club or Organization Meetings: Never     Marital Status:    Housing Stability: Low Risk  (10/2/2023)    Housing Stability Vital Sign     Unable to Pay for Housing in the Last Year: No     Number of Places Lived in the Last Year: 1     Unstable Housing in the Last Year: No       Roles and Relationships  Primary Source of Support/Comfort: child(cherelle)  Name of Support/Comfort Primary Source: -- (Sariah)      Advance Directives (For Healthcare)  Advance Directive  (If Adv Dir status is received, view document under Adv Dir in header or Chart Review Media tab): Patient does not have Advance Directive, declines information.        Patient Reported  Insurance  Verified current insurance plan:: [employer]  [employer] benefits discussed:: Raghavendra Varghese            10/10/2023     4:09 PM 9/12/2023     1:32 PM 9/13/2022     9:00 PM 6/9/2022     5:00 PM 7/25/2021     7:00 PM 5/25/2021    10:03 AM 4/29/2021     9:00 AM   Depression Patient Health Questionnaire   Over the last two weeks how often have you been bothered by little interest or pleasure in doing things Not at all Not at all Not at all Not at all Not at all Not at all Not at all   Over the last two weeks how often have you been bothered by feeling down, depressed or hopeless Not at all Not at all Not at all Not at all Not at all Not at all Not at all   PHQ-2 Total Score 0 0 0 0 0 0 0       Learning Assessment       10/10/2023 1609 [facility] (10/10/2023 - Present)   Created by Mary Jane Peralta RN -  (Nurse) Status: Complete                 PRIMARY LEARNER     Primary Learner Name:  Sariah RS - 10/10/2023 1609    Relationship:  Family RS - 10/10/2023 1609    Does the primary learner have any barriers to learning?:  No Barriers RS - 10/10/2023 1609    What is the preferred language of the primary learner?:  English RS - 10/10/2023 1609    Is an  required?:  No  - 10/10/2023 1609    How does the primary learner prefer to learn new concepts?:  Listening RS - 10/10/2023 1609    How often do you need to have someone help you read instructions, pamphlets, or written material from your doctor or pharmacy?:  Never RS - 10/10/2023 1609        CO-LEARNER #1     No question answered        CO-LEARNER #2     No question answered        SPECIAL TOPICS     No question answered        ANSWERED BY:     No question answered        Sorenit Mary Jane Fair, RN -  (Nurse)   10/10/2023 1609

## (undated) DEVICE — SOLUTION IV 1000ML LAC RINGERS PH 6.5 INJ USP VIAFLX PLAS

## (undated) DEVICE — GLOVE SURG SZ 65 L12IN FNGR THK94MIL STD WHT LTX FREE

## (undated) DEVICE — SURGICAL PROCEDURE PACK EYE ANDRSN

## (undated) DEVICE — NEEDLE ANES 23GA L1.5IN RETROBLB

## (undated) DEVICE — 3M™ TEGADERM™ TRANSPARENT FILM DRESSING FRAME STYLE, 1624W, 2-3/8 IN X 2-3/4 IN (6 CM X 7 CM), 100/CT 4CT/CASE: Brand: 3M™ TEGADERM™

## (undated) DEVICE — GLOVE,SURG,SENSICARE,ALOE,LF,PF,7: Brand: MEDLINE

## (undated) DEVICE — FORCEPS BX L240CM JAW DIA2.8MM L CAP W/ NDL MIC MESH TOOTH

## (undated) DEVICE — AIRLIFE™ NASAL OXYGEN CANNULA CURVED, FLARED TIP, WITH 7 FEET (2.1 M) CRUSH RESISTANT TUBING, OVER-THE-EAR STYLE: Brand: AIRLIFE™

## (undated) DEVICE — SILICONE I/A TIP STRAIGHT: Brand: ALCON

## (undated) DEVICE — Z DISCONTINUED USE 2276105 GOWN PROTCT UNIV CHST W28IN L49IN SL 24IN BLU SPUNBOND FLM

## (undated) DEVICE — ENDOSCOPIC KIT 2 12 FT OP4 DE2 GWN SYR

## (undated) DEVICE — SURGICAL PROCEDURE PACK PPK8711] ALCON LABORATORIES INC]

## (undated) DEVICE — CATHETER IV 20GA L1.25IN PNK FEP SFTY STR HUB RADPQ DISP

## (undated) DEVICE — SET ADMIN PRIMING 7ML L30IN 7.35LB 20 GTT 2ND RLER CLMP

## (undated) DEVICE — TOWEL,OR,DSP,ST,BLUE,STD,4/PK,20PK/CS: Brand: MEDLINE

## (undated) DEVICE — ELECTRODE ECG MONITR FOAM TEAR DROP ADLT RED

## (undated) DEVICE — PATIENT CARE KIT EYE POST OP

## (undated) DEVICE — CONMED SCOPE SAVER BITE BLOCK, 20X27 MM: Brand: SCOPE SAVER

## (undated) DEVICE — SOLUTION IRRIG 250ML STRL H2O PLAS POUR BTL USP

## (undated) DEVICE — SET GRAV VENT NVENT CK VLV 3 NDL FREE PRT 10 GTT